# Patient Record
Sex: MALE | Race: WHITE | NOT HISPANIC OR LATINO | Employment: FULL TIME | URBAN - METROPOLITAN AREA
[De-identification: names, ages, dates, MRNs, and addresses within clinical notes are randomized per-mention and may not be internally consistent; named-entity substitution may affect disease eponyms.]

---

## 2017-04-17 ENCOUNTER — TELEPHONE (OUTPATIENT)
Dept: INTERNAL MEDICINE | Facility: CLINIC | Age: 52
End: 2017-04-17

## 2017-04-17 NOTE — TELEPHONE ENCOUNTER
----- Message from Taisha Potts sent at 4/17/2017 12:19 PM CDT -----  Contact: patient [phone] cell phone   Doctor appointment and lab have been scheduled.  Please link lab orders to the lab appointment.  Date of doctor appointment:  06/19/17  Physical or EP:  epp  Date of lab appointment:  06/12/17  Comments: Pt would like to speak with the nurse. Please call him .

## 2017-04-19 ENCOUNTER — PATIENT MESSAGE (OUTPATIENT)
Dept: INTERNAL MEDICINE | Facility: CLINIC | Age: 52
End: 2017-04-19

## 2017-04-19 NOTE — TELEPHONE ENCOUNTER
Called pt back his wife answered and informed me that they have a friend who is looking for a new PCP and they were wondering if Dr Sanchez would be able to take him on. i did inform them that technically Dr Sanchez is not taking new patients but it is at his discretion and i would send him a message and see what he says and get back to them. The pts name is Jevon Grullon MRN: [id number] : 66. i will be sending a message from that pts account to dr sanchez as well.

## 2017-06-19 ENCOUNTER — OFFICE VISIT (OUTPATIENT)
Dept: INTERNAL MEDICINE | Facility: CLINIC | Age: 52
End: 2017-06-19
Payer: COMMERCIAL

## 2017-06-19 ENCOUNTER — LAB VISIT (OUTPATIENT)
Dept: LAB | Facility: HOSPITAL | Age: 52
End: 2017-06-19
Attending: INTERNAL MEDICINE
Payer: COMMERCIAL

## 2017-06-19 VITALS
HEIGHT: 72 IN | WEIGHT: 223.56 LBS | BODY MASS INDEX: 30.28 KG/M2 | SYSTOLIC BLOOD PRESSURE: 136 MMHG | DIASTOLIC BLOOD PRESSURE: 86 MMHG | HEART RATE: 48 BPM

## 2017-06-19 DIAGNOSIS — R00.0 TACHYCARDIA: ICD-10-CM

## 2017-06-19 DIAGNOSIS — Z12.11 SCREENING FOR COLON CANCER: ICD-10-CM

## 2017-06-19 DIAGNOSIS — R79.89 ELEVATED LIVER FUNCTION TESTS: ICD-10-CM

## 2017-06-19 DIAGNOSIS — Z00.00 ANNUAL PHYSICAL EXAM: ICD-10-CM

## 2017-06-19 DIAGNOSIS — Z00.00 ANNUAL PHYSICAL EXAM: Primary | ICD-10-CM

## 2017-06-19 LAB
ALBUMIN SERPL BCP-MCNC: 4.3 G/DL
ALP SERPL-CCNC: 49 U/L
ALT SERPL W/O P-5'-P-CCNC: 34 U/L
ANION GAP SERPL CALC-SCNC: 9 MMOL/L
AST SERPL-CCNC: 24 U/L
BASOPHILS # BLD AUTO: 0.01 K/UL
BASOPHILS NFR BLD: 0.2 %
BILIRUB SERPL-MCNC: 0.9 MG/DL
BUN SERPL-MCNC: 17 MG/DL
CALCIUM SERPL-MCNC: 9.8 MG/DL
CHLORIDE SERPL-SCNC: 107 MMOL/L
CHOLEST/HDLC SERPL: 4.1 {RATIO}
CO2 SERPL-SCNC: 26 MMOL/L
COMPLEXED PSA SERPL-MCNC: 0.37 NG/ML
CREAT SERPL-MCNC: 1.2 MG/DL
DIFFERENTIAL METHOD: ABNORMAL
EOSINOPHIL # BLD AUTO: 0 K/UL
EOSINOPHIL NFR BLD: 0.5 %
ERYTHROCYTE [DISTWIDTH] IN BLOOD BY AUTOMATED COUNT: 13.2 %
EST. GFR  (AFRICAN AMERICAN): >60 ML/MIN/1.73 M^2
EST. GFR  (NON AFRICAN AMERICAN): >60 ML/MIN/1.73 M^2
GLUCOSE SERPL-MCNC: 105 MG/DL
HCT VFR BLD AUTO: 40.1 %
HDL/CHOLESTEROL RATIO: 24.4 %
HDLC SERPL-MCNC: 176 MG/DL
HDLC SERPL-MCNC: 43 MG/DL
HGB BLD-MCNC: 13.8 G/DL
LDLC SERPL CALC-MCNC: 119.4 MG/DL
LYMPHOCYTES # BLD AUTO: 1.5 K/UL
LYMPHOCYTES NFR BLD: 36 %
MCH RBC QN AUTO: 32.9 PG
MCHC RBC AUTO-ENTMCNC: 34.4 %
MCV RBC AUTO: 96 FL
MONOCYTES # BLD AUTO: 0.5 K/UL
MONOCYTES NFR BLD: 11.8 %
NEUTROPHILS # BLD AUTO: 2.1 K/UL
NEUTROPHILS NFR BLD: 51.5 %
NONHDLC SERPL-MCNC: 133 MG/DL
PLATELET # BLD AUTO: 183 K/UL
PMV BLD AUTO: 11.9 FL
POTASSIUM SERPL-SCNC: 4.9 MMOL/L
PROT SERPL-MCNC: 7.3 G/DL
RBC # BLD AUTO: 4.19 M/UL
SODIUM SERPL-SCNC: 142 MMOL/L
TRIGL SERPL-MCNC: 68 MG/DL
TSH SERPL DL<=0.005 MIU/L-ACNC: 1.54 UIU/ML
WBC # BLD AUTO: 4.08 K/UL

## 2017-06-19 PROCEDURE — 85025 COMPLETE CBC W/AUTO DIFF WBC: CPT | Mod: PO

## 2017-06-19 PROCEDURE — 99999 PR PBB SHADOW E&M-EST. PATIENT-LVL V: CPT | Mod: PBBFAC,,, | Performed by: INTERNAL MEDICINE

## 2017-06-19 PROCEDURE — 80053 COMPREHEN METABOLIC PANEL: CPT

## 2017-06-19 PROCEDURE — 84443 ASSAY THYROID STIM HORMONE: CPT

## 2017-06-19 PROCEDURE — 80061 LIPID PANEL: CPT

## 2017-06-19 PROCEDURE — 99396 PREV VISIT EST AGE 40-64: CPT | Mod: S$GLB,,, | Performed by: INTERNAL MEDICINE

## 2017-06-19 PROCEDURE — 93010 ELECTROCARDIOGRAM REPORT: CPT | Mod: S$GLB,,, | Performed by: INTERNAL MEDICINE

## 2017-06-19 PROCEDURE — 84153 ASSAY OF PSA TOTAL: CPT

## 2017-06-19 PROCEDURE — 36415 COLL VENOUS BLD VENIPUNCTURE: CPT | Mod: PO

## 2017-06-19 PROCEDURE — 93005 ELECTROCARDIOGRAM TRACING: CPT | Mod: S$GLB,,, | Performed by: INTERNAL MEDICINE

## 2017-06-19 NOTE — PROGRESS NOTES
REASON FOR VISIT:  This is a 52-year-old male who comes in for an annual routine   check.    Recently he had labs through [employer] through his company, [employer].  It was   noted that his ALT was 70 and , also cholesterol of 188 and .    He is also concerned about his heart rate when he exercises.  Actually he is   training for a triathlon.  For a long time he has done jogging, swimming, and   bike riding, but he finds that he can get his heart rate above what might be his   maximal heart rate of 170 very easily and even above that.  He will not be able   to sustain it that long, but it just comes easy for him to do it that way.    There is no unusual shortness of breath or short windedness.  Certainly there is   some based on degree of intense exercise that he is doing.    PAST MEDICAL HISTORY:  Hyperlipidemia in the past.  Nephrolithiasis.  Right shoulder surgery.  Elevated blood pressure in the past.  Elevated liver enzymes.    SOCIAL HISTORY:  Alcohol use, very rare.  Tobacco use, none.  Exercise as   described above.  , has one child.    FAMILY HISTORY:  Father is ; heart disease, kidney disease.  Mother is   alive, no major health conditions.  Two sisters, both with arthritis.  One   sister with fatty liver.  Brother in good health.    MEDICATIONS:  None.    REVIEW OF SYMPTOMS:  He feels well.  No chest pain, shortness of breath, or   abdominal pain.  He has regular bowel function.  No difficulty urinating.  No   arthralgias, headaches, or heartburn.    PHYSICAL EXAMINATION:  VITAL SIGNS:  Heart rate 48.  Blood pressure checked by me 136/86.  HEENT:  Tympanic membranes normal.  Nasal mucosa is clear.  Oropharynx, no   abnormal findings.  NECK:  No thyromegaly.  LUNGS:  Clear breath sounds.  HEART:  Regular rate and rhythm.  ABDOMEN:  Active bowel sounds, soft, nontender.  No hepatosplenomegaly or   abdominal masses.  PULSES:  2+ carotid, 2+ pedal pulses.  EXTREMITIES:  No  edema.  LYMPH GLAND:  No palpable adenopathy.  GENITALIA:  No scrotal masses, no hernias.  RECTAL:  Stool is brown, heme negative.  Prostate is mildly enlarged.    IMPRESSION:  1.  General examination.  2.  Elevated liver enzyme, probably consistent with fatty liver.  3.  Exertional tachycardia, questionable significance.    PLAN:  Today we will get an electrocardiogram and routine labs.  Screening   colonoscopy.  Discussed about him doing a CPX study.  Phone review to follow up.    /sc [id number] review      BIBI/DEEPA  dd: 06/19/2017 10:04:48 (CDT)  td: 06/19/2017 23:07:32 (CDT)  Doc ID   #[id number]  Job ID #[id number]    CC:       Answers for HPI/ROS submitted by the patient on 6/18/2017   activity change: No  unexpected weight change: No  neck pain: No  hearing loss: No  rhinorrhea: No  trouble swallowing: No  eye discharge: No  visual disturbance: No  chest tightness: No  wheezing: No  chest pain: No  palpitations: No  blood in stool: No  constipation: No  vomiting: No  diarrhea: No  polydipsia: No  polyuria: No  difficulty urinating: No  urgency: No  hematuria: No  joint swelling: No  arthralgias: No  headaches: No  weakness: No  confusion: No  dysphoric mood: No

## 2017-06-22 ENCOUNTER — DOCUMENTATION ONLY (OUTPATIENT)
Dept: INTERNAL MEDICINE | Facility: CLINIC | Age: 52
End: 2017-06-22

## 2017-06-22 ENCOUNTER — PATIENT MESSAGE (OUTPATIENT)
Dept: INTERNAL MEDICINE | Facility: CLINIC | Age: 52
End: 2017-06-22

## 2017-06-22 DIAGNOSIS — R94.31 ABNORMAL ECG: Primary | ICD-10-CM

## 2017-06-22 DIAGNOSIS — R03.0 ELEVATED BP WITHOUT DIAGNOSIS OF HYPERTENSION: ICD-10-CM

## 2017-06-22 NOTE — PROGRESS NOTES
Lab studies were fine     Liver studies were normal     ecg reveals changes from before  Will set up stress echo   Will also see if there is any changes with bp and heart with exercise

## 2017-06-23 ENCOUNTER — TELEPHONE (OUTPATIENT)
Dept: INTERNAL MEDICINE | Facility: CLINIC | Age: 52
End: 2017-06-23

## 2017-06-23 NOTE — TELEPHONE ENCOUNTER
Set up stress echo  May need to call him to set up (Routing comment)       Patient schedule for Stress test and notified.

## 2017-06-28 ENCOUNTER — HOSPITAL ENCOUNTER (OUTPATIENT)
Dept: CARDIOLOGY | Facility: CLINIC | Age: 52
Discharge: HOME | End: 2017-06-28
Payer: COMMERCIAL

## 2017-06-28 DIAGNOSIS — R03.0 ELEVATED BP WITHOUT DIAGNOSIS OF HYPERTENSION: ICD-10-CM

## 2017-06-28 DIAGNOSIS — R94.31 ABNORMAL ECG: ICD-10-CM

## 2017-06-28 LAB
DIASTOLIC DYSFUNCTION: NO
MITRAL VALVE MOBILITY: NORMAL
MITRAL VALVE REGURGITATION: NORMAL
RETIRED EF AND QEF - SEE NOTES: 55 (ref 55–65)

## 2017-06-28 PROCEDURE — 93321 DOPPLER ECHO F-UP/LMTD STD: CPT | Mod: S$GLB,,, | Performed by: INTERNAL MEDICINE

## 2017-06-28 PROCEDURE — 93351 STRESS TTE COMPLETE: CPT | Mod: S$GLB,,, | Performed by: INTERNAL MEDICINE

## 2017-07-01 ENCOUNTER — PATIENT MESSAGE (OUTPATIENT)
Dept: INTERNAL MEDICINE | Facility: CLINIC | Age: 52
End: 2017-07-01

## 2017-07-01 DIAGNOSIS — R03.0 ELEVATED BLOOD PRESSURE READING WITHOUT DIAGNOSIS OF HYPERTENSION: Primary | ICD-10-CM

## 2017-07-01 NOTE — PROGRESS NOTES
TEST RESULTS WERE NOTED   NO EXERTIONAL ISCHEMIA    BLOOD PRESSURE WAS UPPER END OF NORMAL AND HAD HYPERTENSIVE RESPONSE TO EXERCISE         RECOMMEND DIGITAL HYPERTENSION PROGRAMM

## 2017-07-03 ENCOUNTER — TELEPHONE (OUTPATIENT)
Dept: INTERNAL MEDICINE | Facility: CLINIC | Age: 52
End: 2017-07-03

## 2017-07-03 NOTE — TELEPHONE ENCOUNTER
Set digital hypertension programm (Routing comment)     L/M for return phone call.    Called patient no answer.

## 2017-07-17 ENCOUNTER — TELEPHONE (OUTPATIENT)
Dept: INTERNAL MEDICINE | Facility: CLINIC | Age: 52
End: 2017-07-17

## 2017-07-17 NOTE — TELEPHONE ENCOUNTER
Pt gave me the pts name and number I called the pt and his name is rex ferraro NIALL 89 he is not in [facility] he said he will try and go on "[employer]"ner and sign up and when he gets that set up he will call again. Will check and see if Dr Sanchez will accept this pt as a new pt. He said it is a friend of his step son

## 2017-07-17 NOTE — TELEPHONE ENCOUNTER
----- Message from Taisha Potts sent at 7/17/2017  1:03 PM CDT -----  Contact: patient [phone]  Pt would like to speak with the nurse about  taking a new patient.

## 2017-07-17 NOTE — TELEPHONE ENCOUNTER
Called pt back pt is asking if Dr Sanchez will take on a new pt rex ferraro. He will call me back with a number. Missed the call. Returned call and left a message on VM to call me back

## 2018-02-01 ENCOUNTER — OFFICE VISIT (OUTPATIENT)
Dept: INTERNAL MEDICINE | Facility: CLINIC | Age: 53
End: 2018-02-01
Payer: COMMERCIAL

## 2018-02-01 VITALS
DIASTOLIC BLOOD PRESSURE: 88 MMHG | HEART RATE: 53 BPM | SYSTOLIC BLOOD PRESSURE: 131 MMHG | WEIGHT: 231.94 LBS | BODY MASS INDEX: 31.42 KG/M2 | TEMPERATURE: 98 F | HEIGHT: 72 IN

## 2018-02-01 DIAGNOSIS — J06.9 VIRAL URI WITH COUGH: Primary | ICD-10-CM

## 2018-02-01 PROCEDURE — 3008F BODY MASS INDEX DOCD: CPT | Mod: S$GLB,,, | Performed by: INTERNAL MEDICINE

## 2018-02-01 PROCEDURE — 99213 OFFICE O/P EST LOW 20 MIN: CPT | Mod: S$GLB,,, | Performed by: INTERNAL MEDICINE

## 2018-02-01 PROCEDURE — 99999 PR PBB SHADOW E&M-EST. PATIENT-LVL III: CPT | Mod: PBBFAC,,, | Performed by: INTERNAL MEDICINE

## 2018-02-01 RX ORDER — PREDNISONE 20 MG/1
TABLET ORAL
Qty: 12 TABLET | Refills: 0 | Status: SHIPPED | OUTPATIENT
Start: 2018-02-01 | End: 2020-07-30 | Stop reason: ALTCHOICE

## 2018-02-01 NOTE — PROGRESS NOTES
REASON FOR VISIT:  This is a 52-year-old male.  For about 10 days, he has been   having persistent chest congestion and cough, this is just not going away.  He   can take any over-the-counter medicines such as Mucinex and he is better for a   while.  He takes it before going to bed and he has no trouble, but he just feels   a bit congested and when he coughs, it is mainly clear mucus.  He is not short   of breath, but sometimes the breath may be a little bit labored.  No recent   sweat, fever.  No head congestion.    PHYSICAL EXAMINATION:  VITAL SIGNS:  Per Epic.  HEENT:  Tympanic membranes normal.  Slight serous effusion, left ear.  Nasal   mucosa is clear.  Oropharynx, no abnormal findings.  CHEST:  Lung fields are clear, good air movement, no wheezes or rales.  HEART:  Regular rate and rhythm.    IMPRESSION:  Viral upper respiratory infection with cough.    PLAN:  Prednisone taper over the next six days, and he can continue Mucinex DM   which works well.      JAM/HN  dd: 02/01/2018 12:20:21 (CST)  td: 02/02/2018 02:06:00 (CST)  Doc ID   #[id number]  Job ID #[id number]    CC:

## 2018-05-25 ENCOUNTER — TELEPHONE (OUTPATIENT)
Dept: ENDOSCOPY | Facility: HOSPITAL | Age: 53
End: 2018-05-25

## 2018-07-31 ENCOUNTER — TELEPHONE (OUTPATIENT)
Dept: INTERNAL MEDICINE | Facility: CLINIC | Age: 53
End: 2018-07-31

## 2018-07-31 ENCOUNTER — PATIENT MESSAGE (OUTPATIENT)
Dept: INTERNAL MEDICINE | Facility: CLINIC | Age: 53
End: 2018-07-31

## 2018-07-31 RX ORDER — CYCLOBENZAPRINE HCL 10 MG
10 TABLET ORAL 3 TIMES DAILY PRN
Qty: 30 TABLET | Refills: 0 | Status: SHIPPED | OUTPATIENT
Start: 2018-07-31 | End: 2018-08-10

## 2018-07-31 NOTE — TELEPHONE ENCOUNTER
"----- Message from Sharlene Packer sent at 7/31/2018  2:31 PM CDT -----  Contact: self/ [phone]  RX request - refill or new RX.  Is this a refill or new RX:    RX name and strength: Cyclobenzaprine 10mg  Directions:   Is this a 30 day or 90 day RX:    Local pharmacy or mail order pharmacy:    Pharmacy name and phone # (DON'T enter "on file" or "in chart"): [id number] - [place] - [address] [facility] [phone] (Phone)  [phone] (Fax)      Comments:        "

## 2020-05-13 ENCOUNTER — TELEPHONE (OUTPATIENT)
Dept: INTERNAL MEDICINE | Facility: CLINIC | Age: 55
End: 2020-05-13

## 2020-05-13 DIAGNOSIS — I48.0 PAF (PAROXYSMAL ATRIAL FIBRILLATION): ICD-10-CM

## 2020-05-13 DIAGNOSIS — Z12.5 ENCOUNTER FOR PROSTATE CANCER SCREENING: ICD-10-CM

## 2020-05-13 DIAGNOSIS — Z00.00 ANNUAL PHYSICAL EXAM: Primary | ICD-10-CM

## 2020-05-13 NOTE — TELEPHONE ENCOUNTER
Call patient    He has an appointment in the afternoon tomorrow    His wife told me that he might want to get blood test in the morning before the appointment    The orders in and we can make arrangements for the be done at [facility]

## 2020-05-14 ENCOUNTER — LAB VISIT (OUTPATIENT)
Dept: LAB | Facility: HOSPITAL | Age: 55
End: 2020-05-14
Attending: INTERNAL MEDICINE
Payer: COMMERCIAL

## 2020-05-14 ENCOUNTER — PATIENT MESSAGE (OUTPATIENT)
Dept: INTERNAL MEDICINE | Facility: CLINIC | Age: 55
End: 2020-05-14

## 2020-05-14 ENCOUNTER — HOSPITAL ENCOUNTER (OUTPATIENT)
Dept: CARDIOLOGY | Facility: CLINIC | Age: 55
Discharge: HOME | End: 2020-05-14
Attending: INTERNAL MEDICINE
Payer: COMMERCIAL

## 2020-05-14 ENCOUNTER — OFFICE VISIT (OUTPATIENT)
Dept: INTERNAL MEDICINE | Facility: CLINIC | Age: 55
End: 2020-05-14
Payer: COMMERCIAL

## 2020-05-14 VITALS
BODY MASS INDEX: 30.75 KG/M2 | WEIGHT: 227 LBS | HEIGHT: 72 IN | DIASTOLIC BLOOD PRESSURE: 88 MMHG | OXYGEN SATURATION: 98 % | HEART RATE: 80 BPM | SYSTOLIC BLOOD PRESSURE: 126 MMHG

## 2020-05-14 VITALS
DIASTOLIC BLOOD PRESSURE: 88 MMHG | WEIGHT: 227 LBS | HEIGHT: 72 IN | HEART RATE: 80 BPM | SYSTOLIC BLOOD PRESSURE: 126 MMHG | BODY MASS INDEX: 30.75 KG/M2

## 2020-05-14 DIAGNOSIS — I48.0 PAF (PAROXYSMAL ATRIAL FIBRILLATION): ICD-10-CM

## 2020-05-14 DIAGNOSIS — I10 ESSENTIAL HYPERTENSION: ICD-10-CM

## 2020-05-14 DIAGNOSIS — Z12.5 ENCOUNTER FOR PROSTATE CANCER SCREENING: ICD-10-CM

## 2020-05-14 DIAGNOSIS — E78.5 HYPERLIPIDEMIA, UNSPECIFIED HYPERLIPIDEMIA TYPE: ICD-10-CM

## 2020-05-14 DIAGNOSIS — I48.91 ATRIAL FIBRILLATION, UNSPECIFIED TYPE: ICD-10-CM

## 2020-05-14 DIAGNOSIS — Z00.00 ANNUAL PHYSICAL EXAM: ICD-10-CM

## 2020-05-14 DIAGNOSIS — Z00.00 ANNUAL PHYSICAL EXAM: Primary | ICD-10-CM

## 2020-05-14 LAB
ALBUMIN SERPL BCP-MCNC: 4.3 G/DL (ref 3.5–5.2)
ALP SERPL-CCNC: 50 U/L (ref 55–135)
ALT SERPL W/O P-5'-P-CCNC: 35 U/L (ref 10–44)
ANION GAP SERPL CALC-SCNC: 8 MMOL/L (ref 8–16)
ASCENDING AORTA: 3.25 CM
AST SERPL-CCNC: 25 U/L (ref 10–40)
AV INDEX (PROSTH): 0.47
AV MEAN GRADIENT: 3 MMHG
AV PEAK GRADIENT: 5 MMHG
AV VALVE AREA: 2.28 CM2
AV VELOCITY RATIO: 0.5
BASOPHILS # BLD AUTO: 0.03 K/UL (ref 0–0.2)
BASOPHILS NFR BLD: 0.6 % (ref 0–1.9)
BILIRUB SERPL-MCNC: 0.7 MG/DL (ref 0.1–1)
BSA FOR ECHO PROCEDURE: 2.29 M2
BUN SERPL-MCNC: 19 MG/DL (ref 6–20)
CALCIUM SERPL-MCNC: 9.5 MG/DL (ref 8.7–10.5)
CHLORIDE SERPL-SCNC: 108 MMOL/L (ref 95–110)
CHOLEST SERPL-MCNC: 190 MG/DL (ref 120–199)
CHOLEST/HDLC SERPL: 5.3 {RATIO} (ref 2–5)
CO2 SERPL-SCNC: 27 MMOL/L (ref 23–29)
COMPLEXED PSA SERPL-MCNC: 0.32 NG/ML (ref 0–4)
CREAT SERPL-MCNC: 1.2 MG/DL (ref 0.5–1.4)
CV ECHO LV RWT: 0.36 CM
DIFFERENTIAL METHOD: ABNORMAL
DOP CALC AO PEAK VEL: 1.17 M/S
DOP CALC AO VTI: 23.39 CM
DOP CALC LVOT AREA: 4.9 CM2
DOP CALC LVOT DIAMETER: 2.49 CM
DOP CALC LVOT PEAK VEL: 0.58 M/S
DOP CALC LVOT STROKE VOLUME: 53.29 CM3
DOP CALCLVOT PEAK VEL VTI: 10.95 CM
E WAVE DECELERATION TIME: 129.18 MSEC
E/A RATIO: 1.64
E/E' RATIO: 5.13 M/S
ECHO LV POSTERIOR WALL: 0.88 CM (ref 0.6–1.1)
EOSINOPHIL # BLD AUTO: 0 K/UL (ref 0–0.5)
EOSINOPHIL NFR BLD: 0.6 % (ref 0–8)
ERYTHROCYTE [DISTWIDTH] IN BLOOD BY AUTOMATED COUNT: 13 % (ref 11.5–14.5)
EST. GFR  (AFRICAN AMERICAN): >60 ML/MIN/1.73 M^2
EST. GFR  (NON AFRICAN AMERICAN): >60 ML/MIN/1.73 M^2
FRACTIONAL SHORTENING: 27 % (ref 28–44)
GLUCOSE SERPL-MCNC: 110 MG/DL (ref 70–110)
HCT VFR BLD AUTO: 46.8 % (ref 40–54)
HDLC SERPL-MCNC: 36 MG/DL (ref 40–75)
HDLC SERPL: 18.9 % (ref 20–50)
HGB BLD-MCNC: 15.1 G/DL (ref 14–18)
IMM GRANULOCYTES # BLD AUTO: 0.01 K/UL (ref 0–0.04)
IMM GRANULOCYTES NFR BLD AUTO: 0.2 % (ref 0–0.5)
INTERVENTRICULAR SEPTUM: 1.13 CM (ref 0.6–1.1)
LA MAJOR: 6.9 CM
LA MINOR: 6.76 CM
LA WIDTH: 4.54 CM
LDLC SERPL CALC-MCNC: 141.4 MG/DL (ref 63–159)
LEFT ATRIUM SIZE: 4.36 CM
LEFT ATRIUM VOLUME INDEX: 51.1 ML/M2
LEFT ATRIUM VOLUME: 114.9 CM3
LEFT INTERNAL DIMENSION IN SYSTOLE: 3.52 CM (ref 2.1–4)
LEFT VENTRICLE DIASTOLIC VOLUME INDEX: 48.51 ML/M2
LEFT VENTRICLE DIASTOLIC VOLUME: 109.06 ML
LEFT VENTRICLE MASS INDEX: 77 G/M2
LEFT VENTRICLE SYSTOLIC VOLUME INDEX: 23 ML/M2
LEFT VENTRICLE SYSTOLIC VOLUME: 51.67 ML
LEFT VENTRICULAR INTERNAL DIMENSION IN DIASTOLE: 4.83 CM (ref 3.5–6)
LEFT VENTRICULAR MASS: 173.1 G
LV LATERAL E/E' RATIO: 3.93 M/S
LV SEPTAL E/E' RATIO: 7.38 M/S
LYMPHOCYTES # BLD AUTO: 1.9 K/UL (ref 1–4.8)
LYMPHOCYTES NFR BLD: 41.1 % (ref 18–48)
MCH RBC QN AUTO: 32.5 PG (ref 27–31)
MCHC RBC AUTO-ENTMCNC: 32.3 G/DL (ref 32–36)
MCV RBC AUTO: 101 FL (ref 82–98)
MONOCYTES # BLD AUTO: 0.5 K/UL (ref 0.3–1)
MONOCYTES NFR BLD: 9.6 % (ref 4–15)
MV PEAK A VEL: 0.36 M/S
MV PEAK E VEL: 0.59 M/S
NEUTROPHILS # BLD AUTO: 2.2 K/UL (ref 1.8–7.7)
NEUTROPHILS NFR BLD: 47.9 % (ref 38–73)
NONHDLC SERPL-MCNC: 154 MG/DL
NRBC BLD-RTO: 0 /100 WBC
PISA TR MAX VEL: 2.06 M/S
PLATELET # BLD AUTO: 225 K/UL (ref 150–350)
PMV BLD AUTO: 11.1 FL (ref 9.2–12.9)
POTASSIUM SERPL-SCNC: 4.6 MMOL/L (ref 3.5–5.1)
PROT SERPL-MCNC: 7.1 G/DL (ref 6–8.4)
PULM VEIN S/D RATIO: 0.7
PV PEAK D VEL: 0.43 M/S
PV PEAK S VEL: 0.3 M/S
RA MAJOR: 5.64 CM
RA PRESSURE: 3 MMHG
RA WIDTH: 3.5 CM
RBC # BLD AUTO: 4.64 M/UL (ref 4.6–6.2)
RIGHT VENTRICULAR END-DIASTOLIC DIMENSION: 3.5 CM
SINUS: 3.95 CM
SODIUM SERPL-SCNC: 143 MMOL/L (ref 136–145)
STJ: 3.04 CM
TDI LATERAL: 0.15 M/S
TDI SEPTAL: 0.08 M/S
TDI: 0.12 M/S
TR MAX PG: 17 MMHG
TRICUSPID ANNULAR PLANE SYSTOLIC EXCURSION: 1.51 CM
TRIGL SERPL-MCNC: 63 MG/DL (ref 30–150)
TSH SERPL DL<=0.005 MIU/L-ACNC: 2.2 UIU/ML (ref 0.4–4)
TV REST PULMONARY ARTERY PRESSURE: 20 MMHG
WBC # BLD AUTO: 4.67 K/UL (ref 3.9–12.7)

## 2020-05-14 PROCEDURE — 99396 PREV VISIT EST AGE 40-64: CPT | Mod: S$GLB,,, | Performed by: INTERNAL MEDICINE

## 2020-05-14 PROCEDURE — 93005 EKG 12-LEAD: ICD-10-PCS | Mod: S$GLB,,, | Performed by: INTERNAL MEDICINE

## 2020-05-14 PROCEDURE — 80053 COMPREHEN METABOLIC PANEL: CPT

## 2020-05-14 PROCEDURE — 99999 PR PBB SHADOW E&M-EST. PATIENT-LVL III: ICD-10-PCS | Mod: PBBFAC,,, | Performed by: INTERNAL MEDICINE

## 2020-05-14 PROCEDURE — 85025 COMPLETE CBC W/AUTO DIFF WBC: CPT

## 2020-05-14 PROCEDURE — 84443 ASSAY THYROID STIM HORMONE: CPT

## 2020-05-14 PROCEDURE — 80061 LIPID PANEL: CPT

## 2020-05-14 PROCEDURE — 36415 COLL VENOUS BLD VENIPUNCTURE: CPT

## 2020-05-14 PROCEDURE — 93306 ECHO (CUPID ONLY): ICD-10-PCS | Mod: S$GLB,,, | Performed by: INTERNAL MEDICINE

## 2020-05-14 PROCEDURE — 93306 TTE W/DOPPLER COMPLETE: CPT | Mod: S$GLB,,, | Performed by: INTERNAL MEDICINE

## 2020-05-14 PROCEDURE — 93010 ELECTROCARDIOGRAM REPORT: CPT | Mod: S$GLB,,, | Performed by: INTERNAL MEDICINE

## 2020-05-14 PROCEDURE — 93005 ELECTROCARDIOGRAM TRACING: CPT | Mod: S$GLB,,, | Performed by: INTERNAL MEDICINE

## 2020-05-14 PROCEDURE — 93010 EKG 12-LEAD: ICD-10-PCS | Mod: S$GLB,,, | Performed by: INTERNAL MEDICINE

## 2020-05-14 PROCEDURE — 84153 ASSAY OF PSA TOTAL: CPT

## 2020-05-14 PROCEDURE — 99999 PR PBB SHADOW E&M-EST. PATIENT-LVL III: CPT | Mod: PBBFAC,,, | Performed by: INTERNAL MEDICINE

## 2020-05-14 PROCEDURE — 99396 PR PREVENTIVE VISIT,EST,40-64: ICD-10-PCS | Mod: S$GLB,,, | Performed by: INTERNAL MEDICINE

## 2020-05-14 NOTE — PROGRESS NOTES
PAST MEDICAL HISTORY:  Hyperlipidemia in the past.  Nephrolithiasis.  Right shoulder surgery.  Elevated blood pressure in the past.  Elevated liver enzymes.     SOCIAL HISTORY:  Alcohol use, very rare.  Tobacco use, none.  Exercise as described above.  , has one child.     FAMILY HISTORY:  Father is ; heart disease, kidney disease.  Mother is alive, no major health conditions.  Two sisters, both with arthritis.  One sister with fatty liver.  Brother in good health.      55-year-old male    Annual visit    Noted is that he had a electrocardiogram part of a company physical on 2020 and was reported as atrial fibrillation  He was told to follow-up with his primary care physician  He was asymptomatic in overall feels fine    Patient exercises regularly with biking in jogging    He reports no chest pain, palpitations, shortness of breath    Examination  Weight 227 lb  Pulse 76  Blood pressure 126/82  HEENT exam no abnormal findings  Neck no thyromegaly no masses  Lungs clear breath sounds  Heart irregular no murmurs  Abdominal exam nontender soft no hepatosplenomegaly  Pulses 2+ carotid pulses 2+ pedal pulses no bruits  Extremities no edema    Impression g  General exam  Atrial fibrillation  Hyperlipidemia    Plan  Electrocardiogram  Routine labs were done this morning or pending

## 2020-05-14 NOTE — PROGRESS NOTES
2D echo revealed ejection fraction 50%, left atrial enlargement    Has an appointment with arrhythmia on June 1st but in the meantime will start Eliquis 5 mg b.i.d.

## 2020-05-15 ENCOUNTER — TELEPHONE (OUTPATIENT)
Dept: INTERNAL MEDICINE | Facility: CLINIC | Age: 55
End: 2020-05-15

## 2020-05-15 NOTE — TELEPHONE ENCOUNTER
Reviewed EKG, as I am covering for . Called patient- no answer. Left message. Will continue to try to reach patient by phone

## 2020-05-18 ENCOUNTER — TELEPHONE (OUTPATIENT)
Dept: INTERNAL MEDICINE | Facility: CLINIC | Age: 55
End: 2020-05-18

## 2020-05-18 NOTE — TELEPHONE ENCOUNTER
----- Message from Magali Patel sent at 5/18/2020  9:21 AM CDT -----  Contact: self    Patient is returning a phone call.  Who left a message for the patient: Trinidad Crump MD   Does patient know what this is regarding:  results  Comments:

## 2020-06-01 ENCOUNTER — HOSPITAL ENCOUNTER (OUTPATIENT)
Dept: CARDIOLOGY | Facility: CLINIC | Age: 55
Discharge: HOME | End: 2020-06-01
Payer: COMMERCIAL

## 2020-06-01 ENCOUNTER — TELEPHONE (OUTPATIENT)
Dept: CARDIOLOGY | Facility: CLINIC | Age: 55
End: 2020-06-01

## 2020-06-01 ENCOUNTER — OFFICE VISIT (OUTPATIENT)
Dept: CARDIOLOGY | Facility: CLINIC | Age: 55
End: 2020-06-01
Payer: COMMERCIAL

## 2020-06-01 VITALS
DIASTOLIC BLOOD PRESSURE: 96 MMHG | SYSTOLIC BLOOD PRESSURE: 139 MMHG | BODY MASS INDEX: 31.12 KG/M2 | HEART RATE: 71 BPM | HEIGHT: 72 IN | WEIGHT: 229.75 LBS

## 2020-06-01 DIAGNOSIS — I48.91 ATRIAL FIBRILLATION, UNSPECIFIED TYPE: Primary | ICD-10-CM

## 2020-06-01 DIAGNOSIS — R00.2 PALPITATION: ICD-10-CM

## 2020-06-01 DIAGNOSIS — R00.2 PALPITATION: Primary | ICD-10-CM

## 2020-06-01 PROCEDURE — 99203 PR OFFICE/OUTPT VISIT, NEW, LEVL III, 30-44 MIN: ICD-10-PCS | Mod: S$GLB,,, | Performed by: STUDENT IN AN ORGANIZED HEALTH CARE EDUCATION/TRAINING PROGRAM

## 2020-06-01 PROCEDURE — 99999 PR PBB SHADOW E&M-EST. PATIENT-LVL IV: ICD-10-PCS | Mod: PBBFAC,,, | Performed by: STUDENT IN AN ORGANIZED HEALTH CARE EDUCATION/TRAINING PROGRAM

## 2020-06-01 PROCEDURE — 3008F BODY MASS INDEX DOCD: CPT | Mod: CPTII,S$GLB,, | Performed by: STUDENT IN AN ORGANIZED HEALTH CARE EDUCATION/TRAINING PROGRAM

## 2020-06-01 PROCEDURE — 99999 PR PBB SHADOW E&M-EST. PATIENT-LVL IV: CPT | Mod: PBBFAC,,, | Performed by: STUDENT IN AN ORGANIZED HEALTH CARE EDUCATION/TRAINING PROGRAM

## 2020-06-01 PROCEDURE — 99203 OFFICE O/P NEW LOW 30 MIN: CPT | Mod: S$GLB,,, | Performed by: STUDENT IN AN ORGANIZED HEALTH CARE EDUCATION/TRAINING PROGRAM

## 2020-06-01 PROCEDURE — 93010 EKG 12-LEAD: ICD-10-PCS | Mod: S$GLB,,, | Performed by: INTERNAL MEDICINE

## 2020-06-01 PROCEDURE — 3008F PR BODY MASS INDEX (BMI) DOCUMENTED: ICD-10-PCS | Mod: CPTII,S$GLB,, | Performed by: STUDENT IN AN ORGANIZED HEALTH CARE EDUCATION/TRAINING PROGRAM

## 2020-06-01 PROCEDURE — 93005 ELECTROCARDIOGRAM TRACING: CPT | Mod: S$GLB,,, | Performed by: STUDENT IN AN ORGANIZED HEALTH CARE EDUCATION/TRAINING PROGRAM

## 2020-06-01 PROCEDURE — 93010 ELECTROCARDIOGRAM REPORT: CPT | Mod: S$GLB,,, | Performed by: INTERNAL MEDICINE

## 2020-06-01 PROCEDURE — 93005 EKG 12-LEAD: ICD-10-PCS | Mod: S$GLB,,, | Performed by: STUDENT IN AN ORGANIZED HEALTH CARE EDUCATION/TRAINING PROGRAM

## 2020-06-01 RX ORDER — METOPROLOL TARTRATE 25 MG/1
25 TABLET, FILM COATED ORAL 2 TIMES DAILY
Qty: 60 TABLET | Refills: 11 | Status: SHIPPED | OUTPATIENT
Start: 2020-06-01 | End: 2020-10-06

## 2020-06-01 NOTE — PROGRESS NOTES
PCP - Jose Sanchez MD  Referring Physician:     Subjective:   Patient ID:  Jhonatan Sofia is a 55 y.o. y.o. male who presents for evaluation and treatment of  Atrial fibrillation.    - PMHx of HLD, HTN on AC (Eliquis), elevated Liver enzymes, Nephrolithiasis, Obesity (BMI 31)    - FamHx: father  (MI,CKD),    - Social Hx: Social EtOH, nonsmoker, no illcit drug.     Patient seen and evaluated, accompanied with spouse. Patient states he is a , he completed routine annual EKG on 2020 for his work, which resulted in Atrial fibrillation. He has completed yearly EKG, never had Afib prior to this. He works out intensity, noted to have HR >200s hence completed LAUREN 2017, Neg for ischemia, METS 15, Duke score 7, low probability of CV events. Patient is able to complete all of is ADLs. Asymptomatic from his rhythm standpoint, denies chest pain, sob, duncan, no syncope. Never been treated for Afib, no DCCV, no ablation. Denies sleep apnea or snoring, never been evaluate for FANTA. Social EtOH. He was prescribed Eliquis 5mg BID by PCP, however has not started it.       TTE 2020  · Low normal left ventricular systolic function. The estimated ejection fraction is 50%.  · No wall motion abnormalities.  · Mildly reduced right ventricular systolic function.  · Severe left atrial enlargement.  · The estimated PA systolic pressure is 20 mmHg.  · Normal central venous pressure (3 mmHg).  · The sinuses of Valsalva is mildly dilated.  · Atrial fibrillation observed.     LAUREN 2017: Neg for ischemia, METS 15, Duke score 7, low probability of CV events      History:     Social History     Tobacco Use    Smoking status: Never Smoker    Smokeless tobacco: Never Used   Substance Use Topics    Alcohol use: Yes     Alcohol/week: 2.0 standard drinks     Types: 2 Cans of beer per week     Frequency: 2-4 times a month     Drinks per session: 1 or 2     Binge frequency: Less than monthly      Comment: minimal     Family History   Problem Relation Age of Onset    Heart disease Father     Kidney failure Father     Liver disease Sister         fatty liver    Arthritis Sister     Arthritis Sister        Meds:   Review of patient's allergies indicates:  No Known Allergies    Current Outpatient Medications:     apixaban (ELIQUIS) 5 mg Tab, Take 1 tablet (5 mg total) by mouth 2 (two) times daily., Disp: 60 tablet, Rfl: 0    predniSONE (DELTASONE) 20 MG tablet, 3 tablets po daily for 2 days, then 2 tablets po daily for 2 days, then 1 tablets po daily for 2 days (Patient not taking: Reported on 6/1/2020), Disp: 12 tablet, Rfl: 0    Review of Systems   Constitutional: Negative for chills, fever, malaise/fatigue and weight loss.   Respiratory: Negative for cough, sputum production and shortness of breath.    Cardiovascular: Negative for chest pain, palpitations, orthopnea, claudication, leg swelling and PND.   Gastrointestinal: Negative for abdominal pain, diarrhea, nausea and vomiting.   Genitourinary: Negative for dysuria and urgency.       Objective:   BP (!) 139/96 (BP Location: Left arm, Patient Position: Sitting, BP Method: Medium (Automatic))   Pulse 71   Ht 6' (1.829 m)   Wt 104.2 kg (229 lb 11.5 oz)   BMI 31.16 kg/m²   Physical Exam   Constitutional: He is oriented to person, place, and time. No distress.   HENT:   Head: Normocephalic and atraumatic.   Neck: Normal range of motion. Neck supple. No JVD present.   Cardiovascular: Normal rate, normal heart sounds and intact distal pulses. An irregularly irregular rhythm present. Exam reveals no gallop and no friction rub.   No murmur heard.  Pulmonary/Chest: Effort normal and breath sounds normal. No respiratory distress. He has no wheezes. He has no rales.   Abdominal: Soft. Bowel sounds are normal. He exhibits no distension. There is no tenderness.   Musculoskeletal: Normal range of motion.   Neurological: He is alert and oriented to person,  place, and time.   Skin: Skin is warm and dry. He is not diaphoretic.       Labs:     Lab Results   Component Value Date     05/14/2020    K 4.6 05/14/2020     05/14/2020    CO2 27 05/14/2020    BUN 19 05/14/2020    CREATININE 1.2 05/14/2020    ANIONGAP 8 05/14/2020     Lab Results   Component Value Date    HGBA1C 5.7 06/20/2016     No results found for: BNP, BNPTRIAGEBLO    Lab Results   Component Value Date    WBC 4.67 05/14/2020    HGB 15.1 05/14/2020    HCT 46.8 05/14/2020     05/14/2020    GRAN 2.2 05/14/2020    GRAN 47.9 05/14/2020     Lab Results   Component Value Date    CHOL 190 05/14/2020    HDL 36 (L) 05/14/2020    LDLCALC 141.4 05/14/2020    TRIG 63 05/14/2020       Lab Results   Component Value Date     05/14/2020    K 4.6 05/14/2020     05/14/2020    CO2 27 05/14/2020    BUN 19 05/14/2020    CREATININE 1.2 05/14/2020    ANIONGAP 8 05/14/2020     Lab Results   Component Value Date    HGBA1C 5.7 06/20/2016     No results found for: BNP, BNPTRIAGEBLO Lab Results   Component Value Date    WBC 4.67 05/14/2020    HGB 15.1 05/14/2020    HCT 46.8 05/14/2020     05/14/2020    GRAN 2.2 05/14/2020    GRAN 47.9 05/14/2020     Lab Results   Component Value Date    CHOL 190 05/14/2020    HDL 36 (L) 05/14/2020    LDLCALC 141.4 05/14/2020    TRIG 63 05/14/2020                Cardiovascular Imaging:     TTE 05/14/2020  · Low normal left ventricular systolic function. The estimated ejection fraction is 50%.  · No wall motion abnormalities.  · Mildly reduced right ventricular systolic function.  · Severe left atrial enlargement.  · The estimated PA systolic pressure is 20 mmHg.  · Normal central venous pressure (3 mmHg).  · The sinuses of Valsalva is mildly dilated.  · Atrial fibrillation observed.     LAUREN 2017: Neg for ischemia, METS 15, Duke score 7, low probability of CV events      Assessment & Plan:     1. Atrial fibrillation, unspecified type        Jhonatan Sofia is a 55  y.o. y.o. male who presents for evaluation and treatment of Atrial fibrillation. His Afib risk factors include Hypertension, Obesity, Etoh use, and aggressive work outs.    - JUSTA 51, low normal LV / RV function   - Asymptomatic from Afib standpoint   - [id number] score 1 / HASBLED score of 2 with a 1.88% risk of bleeding    - At length discussion with patient and spouse, explained pathophysiology of atrial fibrillation. Patient has a CHADSVASC of 1, literature supports use of AC >2, we discussed the risk of CVA event. Patient would like to initiate AC therapy.    - Eliquis 5mg BID   - Rate control with Metoprolol 25mg BID, noted higher HR 70 - 80s   - EP consultation, Dr. Shell for DCCV vs. AAD vs. Ablation   - LAUREN 2017, negative for ischemic heart disease.   - PcP follow up for PSG   - RTC with me in 6 - 8 weeks      Elevated BP, SBP 130s in clinic:  - not on Anti-HtN, home BP better  - low salt diet  - BP / HR logs       Dyslipidemia:   - Dietary modification   - Repeat Lipid panel in 3 months          Signed:  Justus Mitchell M.D.  Page # [phone]  Cardiovascular Fellow PGY-V  [facility]

## 2020-06-10 PROBLEM — I10 ESSENTIAL HYPERTENSION: Status: ACTIVE | Noted: 2020-06-10

## 2020-06-19 ENCOUNTER — TELEPHONE (OUTPATIENT)
Dept: SLEEP MEDICINE | Facility: OTHER | Age: 55
End: 2020-06-19

## 2020-06-19 ENCOUNTER — PATIENT OUTREACH (OUTPATIENT)
Dept: OTHER | Facility: OTHER | Age: 55
End: 2020-06-19

## 2020-06-19 NOTE — LETTER
June 25, 2020     Jhonatan Sofia  [address] [place] [address]       Dear Jhonatan,    Welcome to [facility] Our goal is to make care effective, proactive and convenient by using data you send us from home to better treat your chronic conditions.              My name is Yoel Ngo, and I am your dedicated Digital Medicine clinician. As an expert in medication management, I will help ensure that the medications you are taking continue to provide the intended benefits and help you reach your goals. You can reach me directly at [phone] or by sending me a message directly through your [facility] account.      I am Nancy Lund and I will be your health . My job is to help you identify lifestyle changes to improve your disease control. We will talk about nutrition, exercise, and other ways you may be able to adjust your current habits to better your health. Additionally, we will help ensure you are completing the tests and screenings that are necessary to help manage your conditions. You can reach me directly at [phone] or by sending me a message directly through your [facility] account.    Most importantly, YOU are at the center of this team. Together, we will work to improve your overall health and encourage you to meet your goals for a healthier lifestyle.     What we expect from YOU:  · Please take frequent home blood pressure measurements. We ask that you take at least 1 blood pressure reading per week, but more information will better help us get you know you. Be sure you rest for a few minutes before taking the reading in a quiet, comfortable place.     Be available to receive phone calls or [phone] messages, when appropriate, from your care team. Please let us know if there are any specific days or times that work best for us to reach you via phone.     Complete routine tests and screenings. Dont worry, we will help keep you on track!           What you should expect  from your Digital Medicine Care Team:   We will work with you to create a personalized plan of care and provide you with encouragement and education, including regarding lifestyle changes, that could help you manage your disease states.     We will adjust your current medications, if needed, and continue to monitor your long-term progress.     We will provide you and your physician with monthly progress reports after you have been in the program for more than 30 days.     We will send you reminders through [employer] and text messages to help ensure you do not miss any testing deadlines to help manage your disease states.    You will be able to reach us by phone or through your [employer] account by clicking our names under Care Team on the right side of the home screen.    I look forward to working with you to achieve your blood pressure goals!    We look forward to working with you to help manage your health,    Sincerely,    Your Digital Medicine Team    Please visit our websites to learn more:   · Hypertension: [email or web address]      Remember, we are not available for emergencies. If you have an emergency, please contact your doctors office directly or call [facility] ([phone] or [phone]) or 911.

## 2020-06-22 DIAGNOSIS — I49.8 OTHER SPECIFIED CARDIAC ARRHYTHMIAS: Primary | ICD-10-CM

## 2020-06-25 ENCOUNTER — TELEPHONE (OUTPATIENT)
Dept: SLEEP MEDICINE | Facility: OTHER | Age: 55
End: 2020-06-25

## 2020-06-25 NOTE — PROGRESS NOTES
"Digital Medicine: Health  Introduction    Introduced Jhonatan Sofia to Digital Medicine. Discussed health  role and recommended lifestyle modifications.    Enrolled Mr. Sofia into [facility] Digital Medicine program. Introduced myself as pt's health  for the program. Discussed lifestyle goals.     Encouraged limiting sodium intake and discussed foods high in sodium. Encouraged the use of salt free seasonings and gave examples. Pt states he went out to eat recently, and noticed his BP was elevated after. We discussed the sodium in the meal and the impact on BP. Also discussed alcohol's impact on BP. Went over recommended limit of 2 drinks daily for men. Pt does not consume alcohol or go out to eat often.     Encouraged 150 minutes of physical activity weekly (~20-25 minutes daily). Pt says he is only cleared for 30 minutes of exercise right now due to Afib. He says this is hard for him, as he used to bike for 2 hours on the weekend.     Pt states this is all "very new" to him. He reports starting taking BP medication on the 22nd. He is taking notes at home so he can relate lifestyle factors to his readings. Gave encouragement and support. Encouraged pt to reach out with questions or concerns.        The history is provided by the patient.     HYPERTENSION  Our goal is to get BP to consistently below 130/80mmHg and make the process convenient so patient can avoid extra trips to the office. Getting your blood pressure below 130/80mmHg (definition of control) will reduce your risk for heart attack, kidney failure, stroke and death (as well as kidney failure, eye disease, & dementia)      Reviewed that the Digital Medicine care team - consisting of a clinician and a health  - will follow the most current evidence-based national guidelines for treating your condition.  The health  will focus on lifestyle modifications and motivation while the clinician will focus on medication therapy.  The care team " will review all data on a regular basis and reach out as needed.      Explained that one of the key parts of the program is communication with the care team.  Asked patient to respond to outreach attempts and complete questionnaires.  Stressed importance of medication adherence.    Reviewed non-pharmacologic therapies and impact on BP.      Explained that we expect patient to obtain several blood pressures per week at random times of day.  Instructed patient not to allow anyone else to use phone and monitoring device.  Confirmed appropriate BP monitoring technique.      Patient's BP goal is 130/80.Patient's BP average is 125/92 mmHg, which is above goal, per 2017 ACC/AHA Hypertension Guidelines.          Last 5 Patient Entered Readings                                      Current 30 Day Average: 125/92     Recent Readings 6/25/2020 6/25/2020 6/25/2020 6/25/2020 6/25/2020    SBP (mmHg) 124 124 129 123 143    DBP (mmHg) 90 96 91 83 100    Pulse 56 62 57 49 52            INTERVENTION(S)  recommended diet modifications, recommend physical activity, reviewed monitoring technique, encouragement/support and goal setting    PLAN  patient verbalizes understanding, patient amenable to changes and continue monitoring      There are no preventive care reminders to display for this patient.    Reviewed the importance of self-monitoring, medication adherence, and that the health  can be used as a resource for lifestyle modifications to help reduce or maintain a healthy lifestyle.    Sent link to [facility] webpages and my contact information via [employer] for future questions. Follow up scheduled.             Diet Screening   He has the following dietary restrictions: low sodium diet    Intervention(s): low sodium diet education, reducing sodium intake, reducing processed foods and reducing seasonings    Physical Activity Screening       Intervention(s): goal setting and goal tracking       SDOH

## 2020-06-26 ENCOUNTER — TELEPHONE (OUTPATIENT)
Dept: ELECTROPHYSIOLOGY | Facility: CLINIC | Age: 55
End: 2020-06-26

## 2020-06-29 ENCOUNTER — TELEPHONE (OUTPATIENT)
Dept: ELECTROPHYSIOLOGY | Facility: CLINIC | Age: 55
End: 2020-06-29

## 2020-06-30 ENCOUNTER — TELEPHONE (OUTPATIENT)
Dept: SLEEP MEDICINE | Facility: OTHER | Age: 55
End: 2020-06-30

## 2020-07-01 ENCOUNTER — TELEPHONE (OUTPATIENT)
Dept: ELECTROPHYSIOLOGY | Facility: CLINIC | Age: 55
End: 2020-07-01

## 2020-07-06 ENCOUNTER — TELEPHONE (OUTPATIENT)
Dept: ELECTROPHYSIOLOGY | Facility: CLINIC | Age: 55
End: 2020-07-06

## 2020-07-06 NOTE — TELEPHONE ENCOUNTER
Lvm to confirm upcoming appt with  office . We need medical Records from any Cardiology  outside of [facility] network & if any implanted devices.

## 2020-07-07 ENCOUNTER — HOSPITAL ENCOUNTER (OUTPATIENT)
Dept: CARDIOLOGY | Facility: CLINIC | Age: 55
Discharge: HOME | End: 2020-07-07
Payer: COMMERCIAL

## 2020-07-07 ENCOUNTER — TELEPHONE (OUTPATIENT)
Dept: SLEEP MEDICINE | Facility: OTHER | Age: 55
End: 2020-07-07

## 2020-07-07 ENCOUNTER — INITIAL CONSULT (OUTPATIENT)
Dept: ELECTROPHYSIOLOGY | Facility: CLINIC | Age: 55
End: 2020-07-07
Payer: COMMERCIAL

## 2020-07-07 VITALS
WEIGHT: 222.88 LBS | BODY MASS INDEX: 30.19 KG/M2 | HEART RATE: 60 BPM | SYSTOLIC BLOOD PRESSURE: 100 MMHG | DIASTOLIC BLOOD PRESSURE: 70 MMHG | HEIGHT: 72 IN

## 2020-07-07 DIAGNOSIS — I48.91 ATRIAL FIBRILLATION, UNSPECIFIED TYPE: Primary | ICD-10-CM

## 2020-07-07 DIAGNOSIS — I49.8 OTHER SPECIFIED CARDIAC ARRHYTHMIAS: ICD-10-CM

## 2020-07-07 DIAGNOSIS — I48.19 PERSISTENT ATRIAL FIBRILLATION: Primary | ICD-10-CM

## 2020-07-07 DIAGNOSIS — I10 ESSENTIAL HYPERTENSION: ICD-10-CM

## 2020-07-07 PROCEDURE — 3008F PR BODY MASS INDEX (BMI) DOCUMENTED: ICD-10-PCS | Mod: CPTII,S$GLB,, | Performed by: INTERNAL MEDICINE

## 2020-07-07 PROCEDURE — 3078F DIAST BP <80 MM HG: CPT | Mod: CPTII,S$GLB,, | Performed by: INTERNAL MEDICINE

## 2020-07-07 PROCEDURE — 93005 ELECTROCARDIOGRAM TRACING: CPT | Mod: S$GLB,,, | Performed by: INTERNAL MEDICINE

## 2020-07-07 PROCEDURE — 3078F PR MOST RECENT DIASTOLIC BLOOD PRESSURE < 80 MM HG: ICD-10-PCS | Mod: CPTII,S$GLB,, | Performed by: INTERNAL MEDICINE

## 2020-07-07 PROCEDURE — 99999 PR PBB SHADOW E&M-EST. PATIENT-LVL IV: CPT | Mod: PBBFAC,,, | Performed by: INTERNAL MEDICINE

## 2020-07-07 PROCEDURE — 99214 PR OFFICE/OUTPT VISIT, EST, LEVL IV, 30-39 MIN: ICD-10-PCS | Mod: S$GLB,,, | Performed by: INTERNAL MEDICINE

## 2020-07-07 PROCEDURE — 3074F SYST BP LT 130 MM HG: CPT | Mod: CPTII,S$GLB,, | Performed by: INTERNAL MEDICINE

## 2020-07-07 PROCEDURE — 3008F BODY MASS INDEX DOCD: CPT | Mod: CPTII,S$GLB,, | Performed by: INTERNAL MEDICINE

## 2020-07-07 PROCEDURE — 3074F PR MOST RECENT SYSTOLIC BLOOD PRESSURE < 130 MM HG: ICD-10-PCS | Mod: CPTII,S$GLB,, | Performed by: INTERNAL MEDICINE

## 2020-07-07 PROCEDURE — 99214 OFFICE O/P EST MOD 30 MIN: CPT | Mod: S$GLB,,, | Performed by: INTERNAL MEDICINE

## 2020-07-07 PROCEDURE — 93010 RHYTHM STRIP: ICD-10-PCS | Mod: S$GLB,,, | Performed by: INTERNAL MEDICINE

## 2020-07-07 PROCEDURE — 93005 RHYTHM STRIP: ICD-10-PCS | Mod: S$GLB,,, | Performed by: INTERNAL MEDICINE

## 2020-07-07 PROCEDURE — 93010 ELECTROCARDIOGRAM REPORT: CPT | Mod: S$GLB,,, | Performed by: INTERNAL MEDICINE

## 2020-07-07 PROCEDURE — 99999 PR PBB SHADOW E&M-EST. PATIENT-LVL IV: ICD-10-PCS | Mod: PBBFAC,,, | Performed by: INTERNAL MEDICINE

## 2020-07-07 RX ORDER — ASPIRIN 81 MG/1
81 TABLET ORAL DAILY
COMMUNITY
End: 2020-10-06

## 2020-07-07 NOTE — PROGRESS NOTES
Subjective:    Patient ID:  Jhonatan Sofia is a 55 y.o. male who presents for evaluation of Atrial Fibrillation    Referring Cardiologists: Justus Mitchell MD and Kenia Walden MD  Primary Care Physician: Jose Sanchez MD    HPI  I had the pleasure of seeing Mr. Sofia today in our electrophysiology clinic in consultation for his new onset persistent atrial fibrillation. 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 He works at [employer] and gets yearly on-site physical exams with electrocardiograms. He reports they have been normal until this past March of 2020 when he was diagnosed with atrial fibrillation. He had no obvious symptoms however his wife and daughter report some increased fatigue that they have noted. He was seen by Dr. Sanchez who referred him to general cardiology. Low dose metoprolol and eliquis were prescribed and he was referred for further evaluation. He has yet to start eliquis.    He exercises regularly with heart rate monitoring and notes at times he sustains his heart rate in the 180s-200 bpm range. He does not use exercise supplements, does not drink alcohol, and wife reports no symptoms consistent with sleep apnea. He had an exercise stress echocardiogram in 2017 which noted no ischemia and showed a structurally normal left ventricle with mild left atrial enlargement (LVEF 55%). A recent echocardiogram noted a LVEF of 50% with a severely enlarged left atrium. A recent TSH lab test indicated a euthyroid state.    I reviewed available electrocardiograms in Epic which show normal sinus rhythm until an ECG dated 5/14/2020 which showed atrial fibrillation with controlled ventricular response.    My interpretation of today's in clinic ECG is AF with an average ventricular rate of 60 bpm.        Review of Systems   Constitution: Negative for fever and malaise/fatigue.   HENT:  Negative for congestion and sore throat.    Eyes: Negative for blurred vision and visual disturbance.   Cardiovascular: Negative for chest pain, dyspnea on exertion, irregular heartbeat, near-syncope, orthopnea, palpitations, paroxysmal nocturnal dyspnea and syncope.   Respiratory: Negative for cough and shortness of breath.    Hematologic/Lymphatic: Negative for bleeding problem. Does not bruise/bleed easily.   Skin: Negative.    Musculoskeletal: Negative.    Gastrointestinal: Negative for bloating, abdominal pain, hematochezia and melena.   Neurological: Negative for focal weakness and weakness.        Objective:    Physical Exam   Constitutional: He is oriented to person, place, and time. He appears well-developed and well-nourished. No distress.   HENT:   Head: Normocephalic and atraumatic.   Eyes: Conjunctivae are normal. Right eye exhibits no discharge. Left eye exhibits no discharge.   Neck: Neck supple. No JVD present.   Cardiovascular: Normal rate. An irregularly irregular rhythm present.   Pulmonary/Chest: Effort normal and breath sounds normal. No respiratory distress. He has no wheezes. He has no rales.   Abdominal: Soft. Bowel sounds are normal.   Musculoskeletal:         General: No edema.   Neurological: He is alert and oriented to person, place, and time.   Skin: Skin is warm and dry. He is not diaphoretic.   Psychiatric: He has a normal mood and affect. His behavior is normal. Judgment and thought content normal.   Vitals reviewed.        Assessment:       1. Persistent atrial fibrillation    2. Essential hypertension         Plan:       In summary, Mr. 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 I had a long discussion with the patient about the pathophysiology and risks of atrial fibrillation and its basic pathophysiology, including its health implications and treatment options. Specifically, I addressed  the need for CVA (stroke) prophylaxis with aspirin versus oral anticoagulation (warfarin vs DOACs, discussed bleeding risks, and need to come to the ER for any head trauma for CT scanning even if asymptomatic). His LORTN1DRIy score is 1 and we discussed that anticoagulation long-term is a risk-benefit shared decision analysis. I also discussed the goal to reduce symptomatic arrhythmic episodes by pharmacologic and/or procedural methods and utilizing a rhythm versus a rate control strategy. He may have latent symptoms per his family (fatigue). Discussed regardless at his age and activity level as well as this being the first documented event I strongly recommend an attempt at restoration of sinus rhythm to assess symptomatic response. He understood and agreed. Discussed need to take eliquis prior and 4 weeks after cardioversion. He can then decide on whether he wants to continue it long-term. If needed would use flecainide for rhythm control. Discussed risk atrial fibrillation in high-endurance athletes and advised to limit his exercise effort to maintain acceptable age-related exercise heart rate.    Plan  RADHIKA/DCCV  Start eliquis 5mg bid    Thank you for allowing me to participate in the care of this patient. Please do not hesitate to call me with any questions or concerns.    Scotty Shell MD, PhD  Cardiac Electrophysiology

## 2020-07-07 NOTE — PATIENT INSTRUCTIONS
Understanding Atrial Fibrillation    An arrhythmia is any problem with the speed or pattern of the heartbeat. Atrial fibrillation (AFib) is a common type of arrhythmia. It causes fast, chaotic electrical signals in the atria. This leads to poor functioning of the heart. It also affects how much blood your heart can pump out to the body.  Afib may occur once in a while and go away on its own. Or it may continue for longer periods and need treatment.  AFib can lead to serious problems, such as stroke. Your healthcare provider will need to monitor and manage it.  What happens during atrial fibrillation?   The heart has an electrical system that sends signals to control the heartbeat. As signals move through the heart, they tell the hearts upper chambers (atria) and lower chambers (ventricles) when to squeeze (contract) and relax. This lets blood move through the heart and out to the body and lungs.  With AFib, the atria receive abnormal signals. This causes them to contract in a fast and irregular way, and out of sync with the ventricles. When this happens, the atria also have a harder time moving blood into the ventricles. Blood may then pool in the atria, which increases the risk for blood clots and stroke. The ventricles also may contract too quickly and irregularly. As a result, they may not pump blood to the body and lungs as well as they should. This can weaken the heart muscle over time and cause heart failure.  What causes atrial fibrillation?  AFib is more common in older adults. It has many possible causes including:  · Coronary artery disease  · Heart valve disease  · Heart attack  · Heart surgery  · High blood pressure  · Thyroid disease  · Diabetes  · Lung disease  · Sleep apnea  · Heavy alcohol use  In some cases of AFib, doctors do not know the cause.  What are the symptoms of atrial fibrillation?  AFib may or may not cause symptoms. If symptoms do occur, they may include:  · A fast, pounding,  irregular heartbeat  · Shortness of breath  · Tiredness  · Dizziness or fainting  · Chest pain  How is atrial fibrillation treated?  Treatments for AFib can include any of the options below.  · Medicines. You may be prescribed:  ¨ Heart rate medicines to help slow down the heartbeat  ¨ Heart rhythm medicines to help the heart beat more regularly  ¨ Anti-clotting medicines to help reduce the risk for blood clots and stroke  · Electrical cardioversion. Your healthcare provider uses special pads or paddles to send one or more brief electrical shocks to the heart. This can help reset the heartbeat to normal.  · Ablation. Long, thin tubes called catheters are threaded through a blood vessel to the heart. There, the catheters send out hot or cold energy to the areas causing the abnormal signals. This energy destroys the problem tissue or cells. This improves the chances that your heart will stay in normal rhythm without using medicines. If your heart rate and rhythm cant be controlled, you may need ablation and a pacemaker. These will help control the heart rate and regularity of the heartbeat.  · Surgery. During surgery, your healthcare provider may use different methods to create scar tissue in the areas of the heart causing the abnormal signals. The scar tissue disrupts the abnormal signals and may stop AFib from occurring.  What are the complications of atrial fibrillation?  These can include:  · Blood clots  · Stroke  · Heart failure. This problem occurs when the heart muscle weakens so much that it can no longer pump blood well.  When should I call my healthcare provider?  Call your healthcare provider right away if you have any of these:  · Symptoms that dont get better with treatment, or get worse  · New symptoms   Date Last Reviewed: 5/1/2016  © 7807-0022 [employer]. [address] [place] [address], [place] [address]. All rights reserved. This information is not intended as a substitute for professional  medical care. Always follow your healthcare professional's instructions.        Living with Atrial Fibrillation: Preventing Stroke  Atrial fibrillation (AFib) is the most common abnormal heart rhythm in the world. The heart has 2 upper chambers called atria and 2 lower chambers called ventricles. AFib causes the atria to quiver (fibrillate) instead of pumping normally. Blood can then pool in the heart instead of moving in and out as usual. This can cause blood clots to form inside the heart. A clot can break free, travel to the brain and cause a stroke. A stroke can cause brain damage very quickly.    Taking medicine to prevent stroke  Your healthcare provider may prescribe a medicine to help prevent blood clots. This type of medicine is called a blood thinner. Blood thinners include:  · Antiplatelet medicines, such as aspirin or clopidrogrel  · Anticoagulation medicines, such as warfarin, dabigatran, rivaroxaban, apixaban, or edoxaban  Risks of blood thinner medicine  Blood thinners increase your risk of bleeding. If you take certain blood thinners, you may need to take extra steps to stay healthy. You may need regular blood tests to check the levels of medicine in your blood. Youll need to be careful not to injure yourself. And you may need to watch your diet for foods that affect blood clotting.  If your blood is too thin, you may have symptoms of excess bleeding, such as:  · Unusual bruising  · Bleeding from the gums  · Blood in the urine or stool  · Black stools  · Vomiting blood  · Nosebleeds  · An unusual or severe headache  Taking the right dose  Youll need to make sure to take the medicine exactly as directed by your healthcare provider. Take it at the same time each day. If you miss a dose, call your provider right away to find out how much to take. Never take a double dose. If you take too much, it can cause too much bleeding. It can cause bleeding you can see, on the outside of your body. And it can  cause bleeding on the inside of your body that you may not be aware of.  Getting your blood tested  Depending on which blood thinner you take, you may need to have your blood tested on a regular schedule. This is to make sure you dont have too much or too little of the medicine in your blood. Too much can cause excess bleeding. Too little may not prevent blood clots from harming you.  You may need to visit a hospital or clinic every week to have your blood tested. Or a nurse may come to your home and test your blood. In some cases, you may be able to test your blood at home with a small machine. Talk with your healthcare provider to find out whats best for you. After the blood test, your healthcare provider may tell you to change your dose of medicine.  Watching your diet  Some foods can affect how certain blood thinners work. In particular, warfarin levels are sensitive to your diet. For example, many foods contain vitamin K. Vitamin K is a substance that helps your blood clot. You dont need to avoid foods that have vitamin K. But you do need to keep the amount of them you eat steady as possible from day to day. Examples of foods high in vitamin K are asparagus, avocado, broccoli, cabbage, kale, spinach, and some other leafy green vegetables. Oils, such as soybean, canola, and olive, are also high in vitamin K.  Other foods and drinks can affect the way blood thinners work in your body. These include:  · Grapefruit and grapefruit juice  · Cranberries and cranberry juice  · Fish oil supplements  · Garlic, allison, licorice, and turmeric  · Herbs used in herbal teas or supplements  · Alcohol  If any of these items are part of your regular diet, continue using them as you normally would. Dont make any major changes in your diet without first talking with your healthcare provider.  You may also need to limit fats in your diet to 2 to 4 tablespoons a day.  Preventing injury  Because blood thinners make you bleed  more, youll need to protect yourself from breaks in the skin. Follow these guidelines:  · Don't go barefoot - always wear shoes.  · Don't trim corns or calluses yourself.  · Consider using an electric razor instead of a manual one.  · Use a soft-bristled toothbrush and waxed dental floss.  Youll also need to avoid any activities that may cause injury. If you fall or are injured, you could be bleeding inside your body and not know it. Make sure to get medical attention right away if you fall, hit your head, or have any other kind of injury.  Other safety tips  While on your medicine, be sure to:  · Tell all of your healthcare providers that you take a blood thinner for AFib. This includes all of your doctors, dental care providers, and your pharmacist.  · Ask your doctor before taking any new medicines, vitamins, or other supplements. Any of these can cause problems when you take a blood thinner.  · Wear a medical alert bracelet or carry an ID card in your wallet if you will be taking blood thinners for months or longer.  · Keep all appointments for your blood tests.  Procedures to prevent stroke  Most blood clots that form in the heart occur in a pouch of the left atrium called the appendage. This pouch can often be large and have multiple lobes which can permit blood pooling and clot formation. Left atrial appendage closure is a nonsurgical procedure in which a self-expanding plug is placed at the opening of the left atrial appendage to close off the appendage from the rest of the heart. Once the plug has fully sealed, no blood can enter or leave the appendage. This reduces blood clot formation and stroke risk. Ask your doctor if you qualify for this type of procedure.  Other ways to help prevent stroke  Your healthcare provider might give you other advice about how to lower your risk for stroke, such as:  · Lowering your cholesterol with lifestyle changes or medicine  · Not smoking  · Getting physical  activity  · Losing weight if needed  · Eating a heart-healthy diet  · Not drinking too much alcohol     When to call your healthcare provider  Call your healthcare provider right away if you have any of these:  · Unusual or severe headache  · Confusion, weakness, or numbness  · Loss of vision  · Difficulty with speech  · Bleeding that wont stop  · Coughing or vomiting blood  · Bright red blood in the stool  · Fall or injury to the head  · Symptoms of atrial fibrillation that are new or getting worse   Date Last Reviewed: 5/1/2016  © 7251-9591 [employer]. [address] [place] [address]. All rights reserved. This information is not intended as a substitute for professional medical care. Always follow your healthcare professional's instructions.

## 2020-07-07 NOTE — LETTER
July 7, 2020      Justus Mitchell MD  [address]  [facility] [address]           [facility] - Arrhythmia  [address]  [facility] [address]  Phone: [phone]  Fax: [phone]          Patient: Jhonatan Sofia   MR Number: [id number]   YOB: 1965   Date of Visit: 7/7/2020       Dear Dr. Justus Mitchell:    Thank you for referring Jhonatan Sofia to me for evaluation. Attached you will find relevant portions of my assessment and plan of care.    If you have questions, please do not hesitate to call me. I look forward to following Jhonatan Sofia along with you.    Sincerely,    Scotty Shell MD    Enclosure  CC:  No Recipients    If you would like to receive this communication electronically, please contact [email or web address] or [phone] to request more information on [employer] access.    For providers and/or their staff who would like to refer a patient to [facility], please contact us through our one-stop-shop provider referral line, [facility], at [phone].    If you feel you have received this communication in error or would no longer like to receive these types of communications, please e-mail [email or web address]

## 2020-07-13 ENCOUNTER — TELEPHONE (OUTPATIENT)
Dept: SLEEP MEDICINE | Facility: OTHER | Age: 55
End: 2020-07-13

## 2020-07-13 NOTE — TELEPHONE ENCOUNTER
Left messages to schedule the home sleep study,no response.  Sent out a message through [employer] to schedule.

## 2020-07-27 ENCOUNTER — TELEPHONE (OUTPATIENT)
Dept: SLEEP MEDICINE | Facility: OTHER | Age: 55
End: 2020-07-27

## 2020-07-27 NOTE — TELEPHONE ENCOUNTER
Left messages and sent out a message through [employer] to schedule the home sleep study.  No response.

## 2020-07-30 ENCOUNTER — PATIENT OUTREACH (OUTPATIENT)
Dept: OTHER | Facility: OTHER | Age: 55
End: 2020-07-30

## 2020-07-30 ENCOUNTER — LAB VISIT (OUTPATIENT)
Dept: LAB | Facility: HOSPITAL | Age: 55
End: 2020-07-30
Attending: INTERNAL MEDICINE
Payer: COMMERCIAL

## 2020-07-30 DIAGNOSIS — I48.91 ATRIAL FIBRILLATION, UNSPECIFIED TYPE: ICD-10-CM

## 2020-07-30 LAB
ANION GAP SERPL CALC-SCNC: 7 MMOL/L (ref 8–16)
BASOPHILS # BLD AUTO: 0.03 K/UL (ref 0–0.2)
BASOPHILS NFR BLD: 0.5 % (ref 0–1.9)
BUN SERPL-MCNC: 24 MG/DL (ref 6–20)
CALCIUM SERPL-MCNC: 9.8 MG/DL (ref 8.7–10.5)
CHLORIDE SERPL-SCNC: 109 MMOL/L (ref 95–110)
CO2 SERPL-SCNC: 28 MMOL/L (ref 23–29)
CREAT SERPL-MCNC: 1.4 MG/DL (ref 0.5–1.4)
DIFFERENTIAL METHOD: ABNORMAL
EOSINOPHIL # BLD AUTO: 0 K/UL (ref 0–0.5)
EOSINOPHIL NFR BLD: 0.5 % (ref 0–8)
ERYTHROCYTE [DISTWIDTH] IN BLOOD BY AUTOMATED COUNT: 13.2 % (ref 11.5–14.5)
EST. GFR  (AFRICAN AMERICAN): >60 ML/MIN/1.73 M^2
EST. GFR  (NON AFRICAN AMERICAN): 57 ML/MIN/1.73 M^2
GLUCOSE SERPL-MCNC: 98 MG/DL (ref 70–110)
HCT VFR BLD AUTO: 42.6 % (ref 40–54)
HGB BLD-MCNC: 14 G/DL (ref 14–18)
IMM GRANULOCYTES # BLD AUTO: 0.01 K/UL (ref 0–0.04)
IMM GRANULOCYTES NFR BLD AUTO: 0.2 % (ref 0–0.5)
LYMPHOCYTES # BLD AUTO: 2.3 K/UL (ref 1–4.8)
LYMPHOCYTES NFR BLD: 40.7 % (ref 18–48)
MCH RBC QN AUTO: 32.1 PG (ref 27–31)
MCHC RBC AUTO-ENTMCNC: 32.9 G/DL (ref 32–36)
MCV RBC AUTO: 98 FL (ref 82–98)
MONOCYTES # BLD AUTO: 0.5 K/UL (ref 0.3–1)
MONOCYTES NFR BLD: 9.1 % (ref 4–15)
NEUTROPHILS # BLD AUTO: 2.8 K/UL (ref 1.8–7.7)
NEUTROPHILS NFR BLD: 49 % (ref 38–73)
NRBC BLD-RTO: 0 /100 WBC
PLATELET # BLD AUTO: 214 K/UL (ref 150–350)
PMV BLD AUTO: 11.1 FL (ref 9.2–12.9)
POTASSIUM SERPL-SCNC: 4.1 MMOL/L (ref 3.5–5.1)
RBC # BLD AUTO: 4.36 M/UL (ref 4.6–6.2)
SODIUM SERPL-SCNC: 144 MMOL/L (ref 136–145)
WBC # BLD AUTO: 5.62 K/UL (ref 3.9–12.7)

## 2020-07-30 PROCEDURE — 85610 PROTHROMBIN TIME: CPT

## 2020-07-30 PROCEDURE — 85730 THROMBOPLASTIN TIME PARTIAL: CPT

## 2020-07-30 PROCEDURE — 85025 COMPLETE CBC W/AUTO DIFF WBC: CPT

## 2020-07-30 PROCEDURE — 36415 COLL VENOUS BLD VENIPUNCTURE: CPT

## 2020-07-30 PROCEDURE — 80048 BASIC METABOLIC PNL TOTAL CA: CPT

## 2020-07-30 NOTE — PROGRESS NOTES
Digital Medicine: Clinician Introduction    Jhonatan Sofia is a 55 y.o. male who is newly enrolled in the Digital Medicine Clinic.    Pt reports doing well but is concerned about some lower readings recently.    The history is provided by the patient.      Review of patient's allergies indicates:  No Known Allergies    HYPERTENSION    Explained non-pharmacologic therapies like low salt diet and physical activity can reduce blood pressure.       Explained that we expect patient to submit several blood pressure readings per week at random times of the day, but at least 30 minutes after taking blood pressure medications. Instructed patient not to allow anyone else to use their blood pressure monitor and phone as data submitted is directly entered into medical record. Reviewed and confirmed appropriate blood pressure monitoring technique.         Reviewed signs/symptoms of hypotension (lightheaded, dizziness, weakness)     Patient's BP goal is less than or equal to 130/80. Patients BP average is 112/83 mmHg, which is at goal, per 2017 ACC/AHA Hypertension Guidelines. Notes low readings after doing a workout a couple days in a row.  Has been holding metoprolol for the past 24 hrs.  Denies associated symptoms.  Cardioversion on Aug 6.      Last 5 Patient Entered Readings                                      Current 30 Day Average: 112/83     Recent Readings 7/30/2020 7/30/2020 7/30/2020 7/29/2020 7/29/2020    SBP (mmHg) 105 104 115 107 109    DBP (mmHg) 74 83 74 79 85    Pulse 71 62 74 89 83              Depression Screening  Did not address depression screening.    Sleep Apnea Screening    Did not address sleep apnea screening.     Medication Affordability Screening  Did not address medication affordability screening.     Medication Adherence-Medication adherence was assessed.    Patient identified the following reasons for non-adherence:  Side effects from medication: low BP readings    Adherence tools used:  None      ASSESSMENT(S)  Patients BP average is 112/83 mmHg, which is at goal. Patient's BP goal is less than or equal to 130/80 per 2017 ACC/AHA Hypertension Guidelines.      PLAN  Medication change: Continue to hold metoprlol w/ close BP monitoring.  Persistent low readings likely to get in the way of upcoming cardioversion.  Will restart metoprolol if needed.  Additional monitoring needed: Daily BP readings    Patient verbalizes understanding. Patient did not express questions or concerns and patient has contact information if needed.    Explained the importance of self-monitoring and medication adherence. Encouraged the patient to communicate with their health  for lifestyle modifications to help improve or maintain a healthy lifestyle.      Sent link to [facility] webpages and my contact information via [employer] for future questions.      Explained to the patient that the Digital Medicine team is not available for emergencies. Advised patient call [facility] ([phone] or [phone]) or [phone] if needed.         There are no preventive care reminders to display for this patient.    Current Medication Regimen:  Hypertension Medications             metoprolol tartrate (LOPRESSOR) 25 MG tablet Take 1 tablet (25 mg total) by mouth 2 (two) times daily.

## 2020-07-30 NOTE — PROGRESS NOTES
Digital Medicine: Health  Follow-Up    The history is provided by the patient.             Reason for review: Blood pressure at goal        Topics Covered on Call: physical activity and Diet    Additional Follow-up details: Called pt for follow up. Average /83. Pt reports concern over lower BP readings. Pt reports skipping his next dose of medication when BP is low. He is looking for further guidance on medication. Will inform clinician. Encouraged pt to consume a salty snack, drink plenty of fluids, and move around slowly if BP <90/60 with hypotension symptoms.    Pt reports he lost ~12-13 lbs since starting the program.     Gave praise for at goal readings, and encouraged continuing with lifestyle modifications.            Diet-no change to diet    No change to diet.  Patient reports eating or drinking the following: Pt continues to consume a low sodium diet. He says he consumes a lot of vegetables, and protein foods. He is not using salt on foods. He does not snack often and rarely has energy or electrolyte drinks.     and Pt says he notices his BP is elevated after consuming Mexican food. Discussed sodium content in Mexican food.     Intervention(s): DASH diet    Additional diet details:    Physical Activity-no change to routine  No change to exercise routine.       Additional physical activity details: Pt continues to exercise daily. He walks and runs for ~30 minutes.      Medication Adherence-Medication adherence was assessed.        PLAN  Continue current diet/physical activity routine:  Provided patient education:    Patient verbalizes understanding. Patient did not express questions or concerns and patient has contact information if needed.    Explained the importance of self-monitoring and medication adherence. Encouraged the patient to communicate with their health  for lifestyle modifications to help improve or maintain a healthy lifestyle.        There are no preventive care reminders to  display for this patient.    Last 5 Patient Entered Readings                                      Current 30 Day Average: 112/83     Recent Readings 7/30/2020 7/30/2020 7/30/2020 7/29/2020 7/29/2020    SBP (mmHg) 105 104 115 107 109    DBP (mmHg) 74 83 74 79 85    Pulse 71 62 74 89 83

## 2020-07-31 LAB
APTT BLDCRRT: 29.6 SEC (ref 21–32)
INR PPP: 1 (ref 0.8–1.2)
PROTHROMBIN TIME: 10.7 SEC (ref 9–12.5)

## 2020-08-03 ENCOUNTER — LAB VISIT (OUTPATIENT)
Dept: SPORTS MEDICINE | Facility: CLINIC | Age: 55
End: 2020-08-03
Payer: COMMERCIAL

## 2020-08-03 DIAGNOSIS — I48.91 ATRIAL FIBRILLATION, UNSPECIFIED TYPE: ICD-10-CM

## 2020-08-03 PROCEDURE — U0003 INFECTIOUS AGENT DETECTION BY NUCLEIC ACID (DNA OR RNA); SEVERE ACUTE RESPIRATORY SYNDROME CORONAVIRUS 2 (SARS-COV-2) (CORONAVIRUS DISEASE [COVID-19]), AMPLIFIED PROBE TECHNIQUE, MAKING USE OF HIGH THROUGHPUT TECHNOLOGIES AS DESCRIBED BY CMS-2020-01-R: HCPCS

## 2020-08-04 LAB — SARS-COV-2 RNA RESP QL NAA+PROBE: NOT DETECTED

## 2020-08-05 ENCOUNTER — TELEPHONE (OUTPATIENT)
Dept: ELECTROPHYSIOLOGY | Facility: CLINIC | Age: 55
End: 2020-08-05

## 2020-08-05 NOTE — TELEPHONE ENCOUNTER
----- Message from Bharati Pisano RN sent at 8/5/2020  4:52 PM CDT -----  Regarding: FW: returning a call  Contact: patient    ----- Message -----  From: Zoe Ugalde  Sent: 8/5/2020   4:35 PM CDT  To: [facility] Staff  Subject: returning a call                                 The pt is returning a call. Please call him back @ [phone]. Thanks, Zoe

## 2020-08-05 NOTE — TELEPHONE ENCOUNTER
Spoke to Mr. Sofia.    CONFIRMED procedure scheduled on 8/6/2020 with 0700 arrival time.     -Elective procedure COVID 19 Testing is negative.   -Advised of current visitor policy which allows for each patient to have one adult visitor pre and post procedure.  -Pre-procedure labs reviewed and nothing of concern noted.   -NPO after midnight night prior to procedure.  -Confirmed compliance of Eliquis and BID dosing.    -Confirmed no recent fever/cough/sob, yeast infection, open sores or skin rash present.    Mr. Sofia verbalizes understanding of above and will call our office with any questions or concerns.

## 2020-08-05 NOTE — TELEPHONE ENCOUNTER
Attempting x 2 to reach Mr. Sofia to confirm RADHIKA/DCCV scheduled tomorrow.  No answer.  Message left reminding him of arrival time of 0700, nothing to eat or drink after midnight and check in will be on the 3rd floor of [facility].  Asked he call our office with any questions or concerns.  Office number also left.

## 2020-08-06 ENCOUNTER — ANESTHESIA (OUTPATIENT)
Dept: MEDSURG UNIT | Facility: HOSPITAL | Age: 55
End: 2020-08-06
Payer: COMMERCIAL

## 2020-08-06 ENCOUNTER — HOSPITAL ENCOUNTER (OUTPATIENT)
Dept: CARDIOLOGY | Facility: HOSPITAL | Age: 55
Discharge: HOME | End: 2020-08-06
Attending: INTERNAL MEDICINE | Admitting: INTERNAL MEDICINE
Payer: COMMERCIAL

## 2020-08-06 ENCOUNTER — ANESTHESIA EVENT (OUTPATIENT)
Dept: MEDSURG UNIT | Facility: HOSPITAL | Age: 55
End: 2020-08-06
Payer: COMMERCIAL

## 2020-08-06 ENCOUNTER — HOSPITAL ENCOUNTER (OUTPATIENT)
Facility: HOSPITAL | Age: 55
Discharge: HOME | End: 2020-08-06
Attending: INTERNAL MEDICINE | Admitting: INTERNAL MEDICINE
Payer: COMMERCIAL

## 2020-08-06 VITALS
WEIGHT: 220 LBS | SYSTOLIC BLOOD PRESSURE: 118 MMHG | HEART RATE: 50 BPM | BODY MASS INDEX: 29.8 KG/M2 | TEMPERATURE: 98 F | RESPIRATION RATE: 20 BRPM | DIASTOLIC BLOOD PRESSURE: 79 MMHG | HEIGHT: 72 IN | OXYGEN SATURATION: 96 %

## 2020-08-06 VITALS — HEIGHT: 72 IN | WEIGHT: 229 LBS | BODY MASS INDEX: 31.02 KG/M2

## 2020-08-06 DIAGNOSIS — I48.19 PERSISTENT ATRIAL FIBRILLATION: Primary | ICD-10-CM

## 2020-08-06 DIAGNOSIS — I48.91 ATRIAL FIBRILLATION: ICD-10-CM

## 2020-08-06 DIAGNOSIS — I48.11 LONGSTANDING PERSISTENT ATRIAL FIBRILLATION: ICD-10-CM

## 2020-08-06 DIAGNOSIS — I48.91 ATRIAL FIBRILLATION, UNSPECIFIED TYPE: ICD-10-CM

## 2020-08-06 PROCEDURE — D9220A PRA ANESTHESIA: Mod: CRNA,,, | Performed by: STUDENT IN AN ORGANIZED HEALTH CARE EDUCATION/TRAINING PROGRAM

## 2020-08-06 PROCEDURE — 93325 DOPPLER ECHO COLOR FLOW MAPG: CPT

## 2020-08-06 PROCEDURE — 93005 ELECTROCARDIOGRAM TRACING: CPT

## 2020-08-06 PROCEDURE — 25000003 PHARM REV CODE 250: Performed by: NURSE PRACTITIONER

## 2020-08-06 PROCEDURE — 37000009 HC ANESTHESIA EA ADD 15 MINS: Performed by: INTERNAL MEDICINE

## 2020-08-06 PROCEDURE — 93312 TRANSESOPHAGEAL ECHO (TEE) (CUPID ONLY): ICD-10-PCS | Mod: 26,,, | Performed by: INTERNAL MEDICINE

## 2020-08-06 PROCEDURE — 93320 TRANSESOPHAGEAL ECHO (TEE) (CUPID ONLY): ICD-10-PCS | Mod: 26,,, | Performed by: INTERNAL MEDICINE

## 2020-08-06 PROCEDURE — 93325 DOPPLER ECHO COLOR FLOW MAPG: CPT | Mod: 26,,, | Performed by: INTERNAL MEDICINE

## 2020-08-06 PROCEDURE — 93325 TRANSESOPHAGEAL ECHO (TEE) (CUPID ONLY): ICD-10-PCS | Mod: 26,,, | Performed by: INTERNAL MEDICINE

## 2020-08-06 PROCEDURE — D9220A PRA ANESTHESIA: Mod: ANES,,, | Performed by: ANESTHESIOLOGY

## 2020-08-06 PROCEDURE — 37000008 HC ANESTHESIA 1ST 15 MINUTES: Performed by: INTERNAL MEDICINE

## 2020-08-06 PROCEDURE — 93010 EKG 12-LEAD: ICD-10-PCS | Mod: ,,, | Performed by: INTERNAL MEDICINE

## 2020-08-06 PROCEDURE — 92960 PR CARDIOVERSION, ELECTIVE;EXTERN: ICD-10-PCS | Mod: ,,, | Performed by: INTERNAL MEDICINE

## 2020-08-06 PROCEDURE — 92960 CARDIOVERSION ELECTRIC EXT: CPT | Performed by: INTERNAL MEDICINE

## 2020-08-06 PROCEDURE — 92960 CARDIOVERSION ELECTRIC EXT: CPT | Mod: ,,, | Performed by: INTERNAL MEDICINE

## 2020-08-06 PROCEDURE — D9220A PRA ANESTHESIA: ICD-10-PCS | Mod: ANES,,, | Performed by: ANESTHESIOLOGY

## 2020-08-06 PROCEDURE — 93320 DOPPLER ECHO COMPLETE: CPT | Mod: 26,,, | Performed by: INTERNAL MEDICINE

## 2020-08-06 PROCEDURE — 93312 ECHO TRANSESOPHAGEAL: CPT | Mod: 26,,, | Performed by: INTERNAL MEDICINE

## 2020-08-06 PROCEDURE — D9220A PRA ANESTHESIA: ICD-10-PCS | Mod: CRNA,,, | Performed by: STUDENT IN AN ORGANIZED HEALTH CARE EDUCATION/TRAINING PROGRAM

## 2020-08-06 PROCEDURE — 25000003 PHARM REV CODE 250: Performed by: STUDENT IN AN ORGANIZED HEALTH CARE EDUCATION/TRAINING PROGRAM

## 2020-08-06 PROCEDURE — 93010 ELECTROCARDIOGRAM REPORT: CPT | Mod: ,,, | Performed by: INTERNAL MEDICINE

## 2020-08-06 PROCEDURE — 63600175 PHARM REV CODE 636 W HCPCS: Performed by: STUDENT IN AN ORGANIZED HEALTH CARE EDUCATION/TRAINING PROGRAM

## 2020-08-06 RX ORDER — SODIUM CHLORIDE 0.9 % (FLUSH) 0.9 %
10 SYRINGE (ML) INJECTION
Status: DISCONTINUED | OUTPATIENT
Start: 2020-08-06 | End: 2020-08-06 | Stop reason: HOSPADM

## 2020-08-06 RX ORDER — PROPOFOL 10 MG/ML
VIAL (ML) INTRAVENOUS CONTINUOUS PRN
Status: DISCONTINUED | OUTPATIENT
Start: 2020-08-06 | End: 2020-08-06

## 2020-08-06 RX ORDER — PROPOFOL 10 MG/ML
VIAL (ML) INTRAVENOUS
Status: DISCONTINUED | OUTPATIENT
Start: 2020-08-06 | End: 2020-08-06

## 2020-08-06 RX ORDER — SODIUM CHLORIDE 9 MG/ML
INJECTION, SOLUTION INTRAVENOUS CONTINUOUS PRN
Status: DISCONTINUED | OUTPATIENT
Start: 2020-08-06 | End: 2020-08-06

## 2020-08-06 RX ORDER — LIDOCAINE HYDROCHLORIDE 20 MG/ML
INJECTION INTRAVENOUS
Status: DISCONTINUED | OUTPATIENT
Start: 2020-08-06 | End: 2020-08-06

## 2020-08-06 RX ADMIN — SODIUM CHLORIDE: 0.9 INJECTION, SOLUTION INTRAVENOUS at 09:08

## 2020-08-06 RX ADMIN — PROPOFOL 100 MCG/KG/MIN: 10 INJECTION, EMULSION INTRAVENOUS at 09:08

## 2020-08-06 RX ADMIN — PROPOFOL 60 MG: 10 INJECTION, EMULSION INTRAVENOUS at 09:08

## 2020-08-06 RX ADMIN — PROPOFOL 40 MG: 10 INJECTION, EMULSION INTRAVENOUS at 09:08

## 2020-08-06 RX ADMIN — SODIUM CHLORIDE 1000 ML: 0.9 INJECTION, SOLUTION INTRAVENOUS at 07:08

## 2020-08-06 RX ADMIN — LIDOCAINE HYDROCHLORIDE 100 MG: 20 INJECTION, SOLUTION INTRAVENOUS at 09:08

## 2020-08-06 NOTE — PLAN OF CARE
Received report from SREEKANTH Lucio. Patient s/p DCCV, AAOx3. VSS, no c/o pain or discomfort at this time, resp even and unlabored.  Post procedure protocol reviewed with patient. Understanding verbalized. Family members updated via cell phone. Nurse call bell within reach. Will continue to monitor per post procedure protocol.

## 2020-08-06 NOTE — NURSING TRANSFER
Nursing Transfer Note      8/6/2020     Transfer To: ep pacu 3 to cath lab holding 10    Transfer via stretcher    Transfer with none per order    Transported by quang,karen    Medicines sent: silvadene    Chart send with patient: Yes    Notified: spouse    Patient reassessed at: 8/6/2020 1030    Upon arrival to floor: patient oriented to room, call bell in reach and bed in lowest position

## 2020-08-06 NOTE — HPI
Mr. Sofia, 55 year old male with prior history of HTN and recently diagnosed AFib (diagnosed on 3/2020). He was asymptomatic, and he was started on AC and metoprolol for rate control. The plan to proceed to restore his normal rhythm. No prior RADHIKA, neck surgery or radiation. EKG this morning shows afib with slow ventricular response. No history of GI bleed, loose teeth, neck radiation or surgery.     Card Meds:  apixaban 5 mg PO BID, Aspirin 81 mg PO daily and metoprolol tartrate 25 mg PO daily.     5/14/2020 TTE  · Low normal left ventricular systolic function. The estimated ejection fraction is 50%.  · No wall motion abnormalities.  · Mildly reduced right ventricular systolic function.  · Severe left atrial enlargement.  · The estimated PA systolic pressure is 20 mmHg.  · Normal central venous pressure (3 mmHg).  · The sinuses of Valsalva is mildly dilated.  · Atrial fibrillation observed.    RADHIKA ROS  Dysphagia or odynophagia:  No  Liver Disease, esophageal disease, or known varices:  No  Upper GI Bleeding: No  Snoring:  No  Sleep Apnea:  No  Prior neck surgery or radiation:  No  History of anesthetic difficulties:  No  Family history of anesthetic difficulties:  No  Last oral intake:  12 hours ago  Able to move neck in all directions:  No

## 2020-08-06 NOTE — PROGRESS NOTES
12 lead ekg done per md order. nsr noted. marge victoria ep np updating pt in ep pacu 3, verbalizes understanding. Silvadene to chest and back s/p cardioversion. Pt educated on purpose of silvadene ointment, verbalizes understanding. Pt aaox4 follows commands.

## 2020-08-06 NOTE — HOSPITAL COURSE
Mr. Sofia underwent RADHIKA without evidence of SURINDER thrombus.  Proceeded with DCCV 200 J x 1 shock, converting from AF to sinus rhythm. He tolerated the procedure without any acute complications.   Post-DCCV ECG revealing of sinus rhythm 64 bpm.  He recovered in PACU prior to being discharged home in stable condition.

## 2020-08-06 NOTE — ANESTHESIA POSTPROCEDURE EVALUATION
Anesthesia Post Evaluation    Patient: Jhonatan Sofia    Procedure(s) Performed: Procedure(s) (LRB):  CARDIOVERSION (N/A)  Transesophageal echo (RADHIKA) intra-procedure log documentation (N/A)    Final Anesthesia Type: general    Patient location during evaluation: PACU  Patient participation: Yes- Able to Participate  Level of consciousness: awake and alert and oriented  Post-procedure vital signs: reviewed and stable  Pain management: adequate  Airway patency: patent    PONV status at discharge: No PONV  Anesthetic complications: no      Cardiovascular status: blood pressure returned to baseline, hemodynamically stable and stable  Respiratory status: unassisted, room air and spontaneous ventilation  Hydration status: euvolemic  Follow-up not needed.          Vitals Value Taken Time   /79 08/06/20 1045   Temp 36.7 °C (98.1 °F) 08/06/20 1022   Pulse 50 08/06/20 1045   Resp 20 08/06/20 1045   SpO2 96 % 08/06/20 1045         No case tracking events are documented in the log.      Pain/Alex Score: Pain Rating Prior to Med Admin: 0 (8/6/2020 10:14 AM)  Alex Score: 10 (8/6/2020 10:49 AM)

## 2020-08-06 NOTE — SUBJECTIVE & OBJECTIVE
Past Medical History:   Diagnosis Date    Anticoagulant long-term use     Hyperlipidemia     Kidney stones        Past Surgical History:   Procedure Laterality Date    SHOULDER SURGERY         Review of patient's allergies indicates:  No Known Allergies    No current facility-administered medications on file prior to encounter.      Current Outpatient Medications on File Prior to Encounter   Medication Sig    apixaban (ELIQUIS) 5 mg Tab Take 1 tablet (5 mg total) by mouth 2 (two) times daily.    metoprolol tartrate (LOPRESSOR) 25 MG tablet Take 1 tablet (25 mg total) by mouth 2 (two) times daily.    aspirin (ECOTRIN) 81 MG EC tablet Take 81 mg by mouth once daily.     Family History     Problem Relation (Age of Onset)    Arthritis Sister, Sister    Heart disease Father    Kidney failure Father    Liver disease Sister        Tobacco Use    Smoking status: Never Smoker    Smokeless tobacco: Never Used   Substance and Sexual Activity    Alcohol use: Yes     Alcohol/week: 2.0 standard drinks     Types: 2 Cans of beer per week     Frequency: 2-4 times a month     Drinks per session: 1 or 2     Binge frequency: Less than monthly     Comment: minimal    Drug use: Not on file    Sexual activity: Not on file       Objective:     Vital Signs (Most Recent):  Temp: 96.6 °F (35.9 °C) (08/06/20 0719)  Pulse: (!) 47 (08/06/20 0719)  Resp: 18 (08/06/20 0719)  BP: 110/82 (08/06/20 0720)  SpO2: 97 % (08/06/20 0719) Vital Signs (24h Range):  Temp:  [96.6 °F (35.9 °C)] 96.6 °F (35.9 °C)  Pulse:  [47] 47  Resp:  [18] 18  SpO2:  [97 %] 97 %  BP: (110-118)/(82-89) 110/82     Weight: 99.8 kg (220 lb)  Body mass index is 29.84 kg/m².    SpO2: 97 %  O2 Device (Oxygen Therapy): room air    No intake or output data in the 24 hours ending 08/06/20 0817    Lines/Drains/Airways     Peripheral Intravenous Line                 Peripheral IV - Single Lumen 08/06/20 0736 20 G Right Antecubital less than 1 day            Physical  Exam  Vital signs reviewed  Constitutional: Oriented to person, place, and time. Appears  well-developed and well-nourished.   HENT:   Head: Normocephalic and atraumatic.   Eyes: EOM are normal.   Neck: Normal range of motion. No JVD present.   Cardiovascular: Normal rate, irregularly irregular rhythm, normal heart sounds and intact distal pulses.   Exam reveals no gallop and no friction rub.   No murmur heard.  Pulmonary/Chest: Effort normal and breath sounds normal. No wheeze or rales present. No chest wall tenderness  Abdominal: Soft. Bowel sounds are normal. There is no tenderness. There is no guarding.   Musculoskeletal: There is no edema present   Skin: Skin is warm and dry.           Significant Labs: CMP No results for input(s): NA, K, CL, CO2, GLU, BUN, CREATININE, CALCIUM, PROT, ALBUMIN, BILITOT, ALKPHOS, AST, ALT, ANIONGAP, ESTGFRAFRICA, EGFRNONAA in the last 48 hours., CBC No results for input(s): WBC, HGB, HCT, PLT in the last 48 hours. and INR No results for input(s): INR, PROTIME in the last 48 hours.    Significant Imaging: Echocardiogram:   Transthoracic echo (TTE) complete (Cupid Only):   Results for orders placed or performed during the hospital encounter of 05/14/20   Echo Color Flow Doppler? No; Bubble Contrast? No   Result Value Ref Range    BSA 2.29 m2    TDI SEPTAL 0.08 m/s    LV LATERAL E/E' RATIO 3.93 m/s    LV SEPTAL E/E' RATIO 7.38 m/s    LA WIDTH 4.54 cm    TDI LATERAL 0.15 m/s    LVIDD 4.83 3.5 - 6.0 cm    IVS 1.13 (A) 0.6 - 1.1 cm    PW 0.88 0.6 - 1.1 cm    LVIDS 3.52 2.1 - 4.0 cm    FS 27 28 - 44 %    LA volume 114.90 cm3    Sinus 3.95 cm    STJ 3.04 cm    Ascending aorta 3.25 cm    LV mass 173.10 g    LA size 4.36 cm    RVDD 3.50 cm    TAPSE 1.51 cm    Left Ventricle Relative Wall Thickness 0.36 cm    AV mean gradient 3 mmHg    AV valve area 2.28 cm2    AV Velocity Ratio 0.50     AV index (prosthetic) 0.47     E/A ratio 1.64     Mean e' 0.12 m/s    E wave decelartion time 129.18 msec     Pulm vein S/D ratio 0.70     LVOT diameter 2.49 cm    LVOT area 4.9 cm2    LVOT peak umer 0.58 m/s    LVOT peak VTI 10.95 cm    Ao peak umer 1.17 m/s    Ao VTI 23.39 cm    LVOT stroke volume 53.29 cm3    AV peak gradient 5 mmHg    E/E' ratio 5.13 m/s    MV Peak E Umer 0.59 m/s    TR Max Umer 2.06 m/s    MV Peak A Umer 0.36 m/s    PV Peak S Umer 0.30 m/s    PV Peak D Umer 0.43 m/s    LV Systolic Volume 51.67 mL    LV Systolic Volume Index 23.0 mL/m2    LV Diastolic Volume 109.06 mL    LV Diastolic Volume Index 48.51 mL/m2    LA Volume Index 51.1 mL/m2    LV Mass Index 77 g/m2    RA Major Axis 5.64 cm    Left Atrium Minor Axis 6.76 cm    Left Atrium Major Axis 6.90 cm    Triscuspid Valve Regurgitation Peak Gradient 17 mmHg    RA Width 3.50 cm    Right Atrial Pressure (from IVC) 3 mmHg    TV rest pulmonary artery pressure 20 mmHg    Narrative    · Left ventricular systolic function. The estimated ejection fraction is   50%.  · No wall motion abnormalities.  · Mildly reduced right ventricular systolic function.  · Severe left atrial enlargement.  · The estimated PA systolic pressure is 20 mmHg.  · Normal central venous pressure (3 mmHg).  · The sinuses of Valsalva is mildly dilated.  · Atrial fibrillation observed.

## 2020-08-06 NOTE — LETTER
July 27, 2020    Jhonatanryan Sofia  [address] [place] [address]       Patient Instructions  RADHIKA (Transesophageal Echocardiogram) with Cardioversion     Pre-Procedure Testing:     July 30, 2020 - 4:30 pm Blood Work  [facility]  [address] [place] [address]    You do NOT need to fast for this blood work.       August 3, 2020 at 3:50 pm- COVID 19 Nasal Swab  [facility]  [address] [facility] [place] [address]    You do NOT need to fast for this.       Important Medication Information:    · Continue taking Eliquis 5 mg twice daily as prescribed.    · You may take your other usual morning medications with a sip of water on the day of your procedure.       **Prior to your procedure please check your skin and groin area thoroughly for any signs or symptoms of infection such as rashes, open sores, yeast infection, or heat rashes.  If you have any concerns with your skin you should be evaluated by your Primary Care Provider or an Urgent Care for treatment prior to your procedure. Your procedure will be cancelled if not treated prior to the day of your procedure.**      Day of Procedure:    August 6, 2020 at 7:00 am  [facility] [address] [place] [address]    Please report to Cardiology Waiting Room on the 3rd floor of the Hospital    · Do not eat or drink anything after 12 midnight on the night before your procedure.  · You are allowed one adult guest to accompany you pre and post procedure.    · You will be going home after your procedure.  · You will need someone to drive you home from the hospital due to anesthesia.       Post Procedure Testing:    August 13, 2020 at 8:00 am - EKG Only - 3rd Floor - We will call you with test results.    [facility] [address] [place] [address]  You will have a post procedural EKG one week after your cardioversion to assess your heart rhythm and rate.       Directions to Cardiology Waiting  Room  If you park in the Parking Garage:  Take elevators to the 2nd floor  Walk up ramp and turn right by Gold Elevators  Take elevator to the 3rd floor  Upon exiting the elevator, turn away from the clinic areas  Walk long sierra around to front of hospital to area with windows overlooking [facility]  Check in at Reception Desk  OR  If family is dropping you off:  Have them drop you off at the front of the Hospital  (Near the ER, where all the flags are hung).  Take the E elevators to the 3rd floor.  Check in at the Reception Desk in the waiting room.      Any need to reschedule or cancel procedures, or any questions regarding your procedures should be addressed directly with the Arrhythmia Department Nurses at the following phone number: [phone].

## 2020-08-06 NOTE — ANESTHESIA PREPROCEDURE EVALUATION
08/06/2020  Pre-operative evaluation for Procedure(s) (LRB):  CARDIOVERSION (N/A)  Transesophageal echo (RADHIKA) intra-procedure log documentation (N/A)    Jhonatan Sofia is a 55 y.o. 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  He had an exercise stress echocardiogram in 2017 which noted no ischemia and showed a structurally normal left ventricle with mild left atrial enlargement (LVEF 55%).     · Low normal left ventricular systolic function. The estimated ejection fraction is 50%.  · No wall motion abnormalities.  · Mildly reduced right ventricular systolic function.  · Severe left atrial enlargement.  · The estimated PA systolic pressure is 20 mmHg.  · Normal central venous pressure (3 mmHg).  · The sinuses of Valsalva is mildly dilated.  · Atrial fibrillation observed.    Patient Active Problem List   Diagnosis    Essential hypertension    Persistent atrial fibrillation    Atrial fibrillation       Review of patient's allergies indicates:  No Known Allergies    No current facility-administered medications on file prior to encounter.      Current Outpatient Medications on File Prior to Encounter   Medication Sig Dispense Refill    apixaban (ELIQUIS) 5 mg Tab Take 1 tablet (5 mg total) by mouth 2 (two) times daily. 60 tablet 3    metoprolol tartrate (LOPRESSOR) 25 MG tablet Take 1 tablet (25 mg total) by mouth 2 (two) times daily. 60 tablet 11    aspirin (ECOTRIN) 81 MG EC tablet Take 81 mg by mouth once daily.         Past Surgical History:   Procedure Laterality Date    SHOULDER SURGERY         Social History     Socioeconomic History    Marital status:      Spouse name: Not on file    Number of children: 1    Years of education: Not on file    Highest education level: Not on file   Occupational History      Employer: [employer]   Social Needs    Financial resource strain: Not hard at all    Food insecurity     Worry: Never true     Inability: Never true    Transportation needs     Medical: No     Non-medical: No   Tobacco Use    Smoking status: Never Smoker    Smokeless tobacco: Never Used   Substance and Sexual Activity    Alcohol use: Yes     Alcohol/week: 2.0 standard drinks     Types: 2 Cans of beer per week     Frequency: 2-4 times a month     Drinks per session: 1 or 2     Binge frequency: Less than monthly     Comment: minimal    Drug use: Not on file    Sexual activity: Not on file   Lifestyle    Physical activity     Days per week: 4 days     Minutes per session: 60 min    Stress: Not at all   Relationships    Social connections     Talks on phone: More than three times a week     Gets together: More than three times a week     Attends [employer] service: Not on file     Active member of club or organization: No     Attends meetings of clubs or organizations: Never     Relationship status:    Other Topics Concern    Not on file   Social History Narrative    Not on file         CBC: No results for input(s): WBC, RBC, HGB, HCT, PLT, MCV, MCH, MCHC in the last 72 hours.    CMP: No results for input(s): NA, K, CL, CO2, BUN, CREATININE, GLU, MG, PHOS, CALCIUM, ALBUMIN, PROT, ALKPHOS, ALT, AST, BILITOT in the last 72 hours.    INR  No results for input(s): PT, INR, PROTIME, APTT in the last 72 hours.        Diagnostic Studies:      EKG:  Atrial fibrillation   ST elevation, consider early repolarization   When compared with ECG of 14-MAY-2020 14:22,   No significant change was found   Confirmed by ROSALINDA LERMA MD ([id number]) on 6/2/2020 9:48:48 AM     2D Echo:  No results found for this or any previous visit.      Anesthesia Evaluation    I have reviewed the Patient Summary Reports.    I have reviewed the Nursing Notes. I have reviewed the NPO Status.      Review of Systems  Anesthesia Hx:  No problems  with previous Anesthesia  History of prior surgery of interest to airway management or planning: Denies Family Hx of Anesthesia complications.   Denies Personal Hx of Anesthesia complications.   Hematology/Oncology:         -- Denies Anemia:   Cardiovascular:   Exercise tolerance: good Denies CABG/stent.  ECG has been reviewed.    Pulmonary:   Denies COPD.  Denies Asthma.  Denies Sleep Apnea.    Renal/:   Denies Chronic Renal Disease. renal calculi     Hepatic/GI:   Denies GERD. Denies Liver Disease.    Neurological:   Denies CVA. Denies Seizures.    Endocrine:   Denies Diabetes.        Physical Exam  General:  Well nourished    Airway/Jaw/Neck:  Airway Findings: Mouth Opening: Normal Tongue: Normal  General Airway Assessment: Adult  Mallampati: II  Improves to II with phonation.  TM Distance: Normal, at least 6 cm  Jaw/Neck Findings:  Neck ROM: Normal ROM      Dental:  Dental Findings: In tact   Chest/Lungs:  Chest/Lungs Findings: Clear to auscultation, Normal Respiratory Rate     Heart/Vascular:  Heart Findings: Rate: Normal        Mental Status:  Mental Status Findings:  Alert and Oriented, Cooperative         Anesthesia Plan  Type of Anesthesia, risks & benefits discussed:  Anesthesia Type:  general  Patient's Preference:   Intra-op Monitoring Plan: standard ASA monitors  Intra-op Monitoring Plan Comments:   Post Op Pain Control Plan: per primary service following discharge from PACU  Post Op Pain Control Plan Comments:   Induction:   IV  Beta Blocker:  Patient is on a Beta-Blocker and has received one dose within the past 24 hours (No further documentation required).       Informed Consent: Patient understands risks and agrees with Anesthesia plan.  Questions answered. Anesthesia consent signed with patient.  ASA Score: 2     Day of Surgery Review of History & Physical:    H&P update referred to the provider.         Ready For Surgery From Anesthesia Perspective.

## 2020-08-06 NOTE — DISCHARGE SUMMARY
[facility]  Cardiac Electrophysiology  Discharge Summary      Patient Name: Jhonatan Sofia  MRN: [id number]  Admission Date: 8/6/2020  Hospital Length of Stay: 0 days  Discharge Date and Time:  08/06/2020 10:14 AM  Attending Physician: Scotty Shell MD    Discharging Provider: Becky Quinones NP  Primary Care Physician: Jose Sanchez MD    HPI:   Jhonatan Sofia is a 55 y.o. male who presents to short stay cardiac unit on 08/05/2020 for planned RADHIKA guided DCCV with Dr. Shell.     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 He works at [employer] and gets yearly on-site physical exams with electrocardiograms. He reports they have been normal until this past March of 2020 when he was diagnosed with atrial fibrillation. He had no obvious symptoms, however his wife and daughter report some increased fatigue that they have noted. He was seen by Dr. Sanchez who referred him to general cardiology. Low dose metoprolol and eliquis were prescribed, but he had yet to start his eliquis at time of EP visit.     Mr. Sofia exercises regularly with heart rate monitoring and notes at times he sustains his heart rate in the 180s-200 bpm range. He does not use exercise supplements, does not drink alcohol, and wife reports no symptoms consistent with sleep apnea. He had an exercise stress echocardiogram in 2017 which noted no ischemia and showed a structurally normal left ventricle with mild left atrial enlargement (LVEF 55%). A recent echocardiogram noted a LVEF of 50% with a severely enlarged left atrium. A recent TSH lab test indicated a euthyroid state.     At initial EP visit with Dr. Shell atrial fibrillation pathophysiology and management were discussed.  Elected to proceed with DCCV for restoration of sinus rhythm.  Eliquis started for 4 weeks prior to cardioversion, and then will mutually decide on long-term use based  on risk-benefit analysis.         Today, Mr. Sofia reports feeling well. He denies any bleeding, overt shortness of breath, chest pains, rash , fevers, chills, or any other acute symptoms     Anticoagulation: Eliquis, last dose taken on 8/6/20   [id number] score = 1   Review of labs: Kidney function normal, Cr 1.4. Hgb 14.0.   COVID 19 Test: Not Detected on 8/3/20     EKG:   My independent visualization of most recent EKG is atrial fibrillation 49 bpm    Procedure(s) (LRB):  CARDIOVERSION (N/A)  Transesophageal echo (RADHIKA) intra-procedure log documentation (N/A)     Indwelling Lines/Drains at time of discharge:  Lines/Drains/Airways     None                 Hospital Course:  Mr. Sofia underwent RADHIKA without evidence of SURINDER thrombus.  Proceeded with DCCV 200 J x 1 shock, converting from AF to sinus rhythm. He tolerated the procedure without any acute complications.   Post-DCCV ECG revealing of sinus rhythm 64 bpm.  He recovered in PACU prior to being discharged home in stable condition.     Consults: Anesthesia       Pending Diagnostic Studies:     Procedure Component Value Units Date/Time    EKG 12-LEAD [[id number]]     Order Status: Sent Lab Status: No result           Final Active Diagnoses:    Diagnosis Date Noted POA    Atrial fibrillation [I48.91] 08/06/2020 Yes    Persistent atrial fibrillation [I48.19] 07/07/2020 Yes      Problems Resolved During this Admission:     Atrial fibrillation  s/p RADHIKA guide DCCV with restoration of sinus rhythm     Plan:  - Continue eliquis 5 mg BID for the next month, will discuss long-term OAC plan at  f/u   - Follow up with ECG in 1 week (8/13/20 at 7:45am)   - Follow up with Dr. Shell in 4 weeks   - Discharge plans/instructions discussed with patient who verbalized understanding and agreement of plans of care. No further questions or concerns  voiced at this time.       Discharged Condition: good    Disposition: Home or Self Care    Follow Up:  Follow-up Information      Scotty Shell MD In 4 weeks.    Specialties: Electrophysiology, Cardiology  Why: s/p RADHIKA/DCCV  Contact information:  [address]  [facility] [address]  [phone]                 Patient Instructions:      Other restrictions (specify):   Order Comments: Medications:   - Continue blood thinner: Eliquis for stroke prevention   - If you have problems or side effects caused by your medications, call the clinic.     Diet   -You may resume oral intake after you are discharged, as long you have no swallowing difficulties.       Side effects:   -You may be drowsy for the remainder of the day from the sedation.       Because you have received sedation for this procedure:   - Limit activity for the remainder of the day.   - Do not smoke for at least 6 hours and until you are fully awake and alert.   - Do not drink alcoholic beverage for 24 hours.   - Do not drive for 24 hours   - Defer important decision making until the following day.       Go to the Emergency Department if you develop:   - Bleeding   - Weakness or numbness   - Visual, gait or speech disturbance   - New chest pain, palpitations, shortness of breath, rapid heart beat, or fainting   - Fever     Any need to reschedule or cancel procedures, or any questions regarding your procedures should be addressed directly with the Arrhythmia Department Nurses at the following phone number: [phone].     Call MD for:  temperature >100.4     Call MD for:  persistent dizziness or light-headedness     Call MD for:  extreme fatigue     Call MD for:  difficulty breathing, headache or visual disturbances         Medications:  Reconciled Home Medications:      Medication List      CONTINUE taking these medications    apixaban 5 mg Tab  Commonly known as: ELIQUIS  Take 1 tablet (5 mg total) by mouth 2 (two) times daily.     aspirin 81 MG EC tablet  Commonly known as: ECOTRIN  Take 81 mg by mouth once daily.     metoprolol tartrate 25 MG tablet  Commonly known as:  LOPRESSOR  Take 1 tablet (25 mg total) by mouth 2 (two) times daily.            Time spent on the discharge of patient: 20 minutes    Becky Quinones NP  Cardiac Electrophysiology  [facility]

## 2020-08-06 NOTE — ASSESSMENT & PLAN NOTE
s/p RADHIKA guide DCCV with restoration of sinus rhythm     Plan:  - Continue eliquis 5 mg BID for the next month, will discuss long-term OAC plan at EP f/u   - Follow up with ECG in 1 week (8/13/20 at 7:45am)   - Follow up with Dr. Shell in 4 weeks   - Discharge plans/instructions discussed with patient who verbalized understanding and agreement of plans of care. No further questions or concerns  voiced at this time.

## 2020-08-06 NOTE — PLAN OF CARE
Vss. sb to sr noted on cm.  Pt aaox4 follows commands.  Pt's wife updated over phone by ep pacu rn. Verbalizes understanding. Silvadene to chest and back. Silvadene with pt. Awaiting sscu bed. Remains in pacu ep 3. 12 lead ekg done per md order. No tele when transfers to sscu per marge metzger np once released from anesthesia. See flowsheet for full assessment.

## 2020-08-06 NOTE — TRANSFER OF CARE
Anesthesia Transfer of Care Note    Patient: Jhonatan Sofia    Procedure(s) Performed: Procedure(s) (LRB):  CARDIOVERSION (N/A)  Transesophageal echo (RADHIKA) intra-procedure log documentation (N/A)    Patient location: PACU    Anesthesia Type: general    Transport from OR: Transported from OR on room air with adequate spontaneous ventilation    Post pain: adequate analgesia    Post assessment: no apparent anesthetic complications    Post vital signs: stable    Level of consciousness: awake    Nausea/Vomiting: no nausea/vomiting    Complications: none    Transfer of care protocol was followed      Last vitals:   Visit Vitals  /82 (BP Location: Right arm, Patient Position: Lying)   Pulse (!) 47   Temp 35.9 °C (96.6 °F) (Temporal)   Resp 18   Ht 6' (1.829 m)   Wt 99.8 kg (220 lb)   SpO2 97%   BMI 29.84 kg/m²

## 2020-08-06 NOTE — HPI
Jhonatan Sofia is a 55 y.o. male who presents to short stay cardiac unit on 08/05/2020 for planned RADHIKA guided DCCV with Dr. Shell.     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 He works at [employer] and gets yearly on-site physical exams with electrocardiograms. He reports they have been normal until this past March of 2020 when he was diagnosed with atrial fibrillation. He had no obvious symptoms, however his wife and daughter report some increased fatigue that they have noted. He was seen by Dr. Sanchez who referred him to general cardiology. Low dose metoprolol and eliquis were prescribed, but he had yet to start his eliquis at time of EP visit.     Mr. Sofia exercises regularly with heart rate monitoring and notes at times he sustains his heart rate in the 180s-200 bpm range. He does not use exercise supplements, does not drink alcohol, and wife reports no symptoms consistent with sleep apnea. He had an exercise stress echocardiogram in 2017 which noted no ischemia and showed a structurally normal left ventricle with mild left atrial enlargement (LVEF 55%). A recent echocardiogram noted a LVEF of 50% with a severely enlarged left atrium. A recent TSH lab test indicated a euthyroid state.     At initial EP visit with Dr. Shell atrial fibrillation pathophysiology and management were discussed.  Elected to proceed with DCCV for restoration of sinus rhythm.  Eliquis started for 4 weeks prior to cardioversion, and then will mutually decide on long-term use based on risk-benefit analysis.         Today, Mr. Sofia reports feeling well. He denies any bleeding, overt shortness of breath, chest pains, rash , fevers, chills, or any other acute symptoms     Anticoagulation: Eliquis, last dose taken on 8/6/20   [id number] score = 1   Review of labs: Kidney function normal, Cr 1.4. Hgb 14.0.   COVID 19 Test: Not Detected on 8/3/20      EKG:   My independent visualization of most recent EKG is atrial fibrillation 49 bpm

## 2020-08-06 NOTE — PLAN OF CARE
Patient discharged per MD orders. Instructions given on medications, wound care, activity, signs of infection, when to call MD, and follow up appointments. Pt verbalized understanding.  Patient AAOx3, VSS, no c/o pain or discomfort at this time.  PIV removed. Patient refused transport, ambulated with RN to front of building to family.

## 2020-08-06 NOTE — PLAN OF CARE
Pt arrived to unit accompanied by spouse.  Vss.  Oriented pt to rm and unit.  Pre op orders and assessment initiated.  Pt remains npo.  Pt in no acute distress and verbalizes no complaints.  Will continue to monitor.  Call bell within reach.

## 2020-08-06 NOTE — LETTER
July 27, 2020    Jhonatan Sofia  [address] [place] [address]       Patient Instructions  RADHIKA (Transesophageal Echocardiogram) with Cardioversion     Pre-Procedure Testing:     July 30, 2020 - Blood Work  [facility]  [address] [place] [address]    You do NOT need to fast for this blood work.       August 3, 2020 at 3:50 pm- COVID 19 Nasal Swab  [facility]  [address] [facility] [address] [facility] [address]    You do NOT need to fast for this.       Important Medication Information:    · Continue taking Eliquis 5 mg twice daily as prescribed.    · You may take your other usual morning medications with a sip of water on the day of your procedure.       **Prior to your procedure please check your skin and groin area thoroughly for any signs or symptoms of infection such as rashes, open sores, yeast infection, or heat rashes.  If you have any concerns with your skin you should be evaluated by your Primary Care Provider or an Urgent Care for treatment prior to your procedure. Your procedure will be cancelled if not treated prior to the day of your procedure.**      Day of Procedure:    August 6, 2020 at 7:00 am  [facility] [address] [place] [address]    Please report to Cardiology Waiting Room on the 3rd floor of the Hospital    · Do not eat or drink anything after 12 midnight on the night before your procedure.  · You are allowed one adult guest to accompany you pre and post procedure.    · You will be going home after your procedure.  · You will need someone to drive you home from the hospital due to anesthesia.       Post Procedure Testing:    August 13, 2020 at 8:00 am - EKG Only - 3rd Floor - We will call you with test results.    [facility] [address] [place] [address]  You will have a post procedural EKG one week after your cardioversion to assess your heart rhythm and rate.       Directions to Cardiology Waiting Room  If  you park in the Parking Garage:  Take elevators to the 2nd floor  Walk up ramp and turn right by Gold Elevators  Take elevator to the 3rd floor  Upon exiting the elevator, turn away from the clinic areas  Walk long sierra around to front of hospital to area with windows overlooking [facility]  Check in at Reception Desk  OR  If family is dropping you off:  Have them drop you off at the front of the Hospital  (Near the ER, where all the flags are hung).  Take the E elevators to the 3rd floor.  Check in at the Reception Desk in the waiting room.      Any need to reschedule or cancel procedures, or any questions regarding your procedures should be addressed directly with the Arrhythmia Department Nurses at the following phone number: [phone].

## 2020-08-06 NOTE — H&P
[facility] - Short Stay Cardiac Unit  Cardiology  History and Physical     Patient Name: Jhonatan Sofia  MRN: [id number]  Admission Date: 8/6/2020  Code Status: No Order   Attending Provider: Scotty Shell MD   Primary Care Physician: Jose Sanchez MD  Principal Problem:<principal problem not specified>    Patient information was obtained from patient and past medical records.     Subjective:     Chief Complaint:  afib      HPI:    Mr. Sofia, 55 year old male with prior history of HTN and recently diagnosed AFib (diagnosed on 3/2020). He was asymptomatic, and he was started on AC and metoprolol for rate control. The plan to proceed to restore his normal rhythm. No prior RADHIKA, neck surgery or radiation. EKG this morning shows afib with slow ventricular response. No history of GI bleed, loose teeth, neck radiation or surgery.     Card Meds:  apixaban 5 mg PO BID, Aspirin 81 mg PO daily and metoprolol tartrate 25 mg PO daily.     5/14/2020 TTE  · Low normal left ventricular systolic function. The estimated ejection fraction is 50%.  · No wall motion abnormalities.  · Mildly reduced right ventricular systolic function.  · Severe left atrial enlargement.  · The estimated PA systolic pressure is 20 mmHg.  · Normal central venous pressure (3 mmHg).  · The sinuses of Valsalva is mildly dilated.  · Atrial fibrillation observed.    RADHIKA ROS  Dysphagia or odynophagia:  No  Liver Disease, esophageal disease, or known varices:  No  Upper GI Bleeding: No  Snoring:  No  Sleep Apnea:  No  Prior neck surgery or radiation:  No  History of anesthetic difficulties:  No  Family history of anesthetic difficulties:  No  Last oral intake:  12 hours ago  Able to move neck in all directions:  No        Past Medical History:   Diagnosis Date    Anticoagulant long-term use     Hyperlipidemia     Kidney stones        Past Surgical History:   Procedure Laterality Date    SHOULDER SURGERY         Review of patient's allergies  indicates:  No Known Allergies    No current facility-administered medications on file prior to encounter.      Current Outpatient Medications on File Prior to Encounter   Medication Sig    apixaban (ELIQUIS) 5 mg Tab Take 1 tablet (5 mg total) by mouth 2 (two) times daily.    metoprolol tartrate (LOPRESSOR) 25 MG tablet Take 1 tablet (25 mg total) by mouth 2 (two) times daily.    aspirin (ECOTRIN) 81 MG EC tablet Take 81 mg by mouth once daily.     Family History     Problem Relation (Age of Onset)    Arthritis Sister, Sister    Heart disease Father    Kidney failure Father    Liver disease Sister        Tobacco Use    Smoking status: Never Smoker    Smokeless tobacco: Never Used   Substance and Sexual Activity    Alcohol use: Yes     Alcohol/week: 2.0 standard drinks     Types: 2 Cans of beer per week     Frequency: 2-4 times a month     Drinks per session: 1 or 2     Binge frequency: Less than monthly     Comment: minimal    Drug use: Not on file    Sexual activity: Not on file       Objective:     Vital Signs (Most Recent):  Temp: 96.6 °F (35.9 °C) (08/06/20 0719)  Pulse: (!) 47 (08/06/20 0719)  Resp: 18 (08/06/20 0719)  BP: 110/82 (08/06/20 0720)  SpO2: 97 % (08/06/20 0719) Vital Signs (24h Range):  Temp:  [96.6 °F (35.9 °C)] 96.6 °F (35.9 °C)  Pulse:  [47] 47  Resp:  [18] 18  SpO2:  [97 %] 97 %  BP: (110-118)/(82-89) 110/82     Weight: 99.8 kg (220 lb)  Body mass index is 29.84 kg/m².    SpO2: 97 %  O2 Device (Oxygen Therapy): room air    No intake or output data in the 24 hours ending 08/06/20 0817    Lines/Drains/Airways     Peripheral Intravenous Line                 Peripheral IV - Single Lumen 08/06/20 0736 20 G Right Antecubital less than 1 day            Physical Exam  Vital signs reviewed  Constitutional: Oriented to person, place, and time. Appears  well-developed and well-nourished.   HENT:   Head: Normocephalic and atraumatic.   Eyes: EOM are normal.   Neck: Normal range of motion. No JVD  present.   Cardiovascular: Normal rate, irregularly irregular rhythm, normal heart sounds and intact distal pulses.   Exam reveals no gallop and no friction rub.   No murmur heard.  Pulmonary/Chest: Effort normal and breath sounds normal. No wheeze or rales present. No chest wall tenderness  Abdominal: Soft. Bowel sounds are normal. There is no tenderness. There is no guarding.   Musculoskeletal: There is no edema present   Skin: Skin is warm and dry.           Significant Labs: CMP No results for input(s): NA, K, CL, CO2, GLU, BUN, CREATININE, CALCIUM, PROT, ALBUMIN, BILITOT, ALKPHOS, AST, ALT, ANIONGAP, ESTGFRAFRICA, EGFRNONAA in the last 48 hours., CBC No results for input(s): WBC, HGB, HCT, PLT in the last 48 hours. and INR No results for input(s): INR, PROTIME in the last 48 hours.    Significant Imaging: Echocardiogram:   Transthoracic echo (TTE) complete (Cupid Only):   Results for orders placed or performed during the hospital encounter of 05/14/20   Echo Color Flow Doppler? No; Bubble Contrast? No   Result Value Ref Range    BSA 2.29 m2    TDI SEPTAL 0.08 m/s    LV LATERAL E/E' RATIO 3.93 m/s    LV SEPTAL E/E' RATIO 7.38 m/s    LA WIDTH 4.54 cm    TDI LATERAL 0.15 m/s    LVIDD 4.83 3.5 - 6.0 cm    IVS 1.13 (A) 0.6 - 1.1 cm    PW 0.88 0.6 - 1.1 cm    LVIDS 3.52 2.1 - 4.0 cm    FS 27 28 - 44 %    LA volume 114.90 cm3    Sinus 3.95 cm    STJ 3.04 cm    Ascending aorta 3.25 cm    LV mass 173.10 g    LA size 4.36 cm    RVDD 3.50 cm    TAPSE 1.51 cm    Left Ventricle Relative Wall Thickness 0.36 cm    AV mean gradient 3 mmHg    AV valve area 2.28 cm2    AV Velocity Ratio 0.50     AV index (prosthetic) 0.47     E/A ratio 1.64     Mean e' 0.12 m/s    E wave decelartion time 129.18 msec    Pulm vein S/D ratio 0.70     LVOT diameter 2.49 cm    LVOT area 4.9 cm2    LVOT peak román 0.58 m/s    LVOT peak VTI 10.95 cm    Ao peak román 1.17 m/s    Ao VTI 23.39 cm    LVOT stroke volume 53.29 cm3    AV peak gradient 5 mmHg     E/E' ratio 5.13 m/s    MV Peak E Umer 0.59 m/s    TR Max Umer 2.06 m/s    MV Peak A Umer 0.36 m/s    PV Peak S Umer 0.30 m/s    PV Peak D Umer 0.43 m/s    LV Systolic Volume 51.67 mL    LV Systolic Volume Index 23.0 mL/m2    LV Diastolic Volume 109.06 mL    LV Diastolic Volume Index 48.51 mL/m2    LA Volume Index 51.1 mL/m2    LV Mass Index 77 g/m2    RA Major Axis 5.64 cm    Left Atrium Minor Axis 6.76 cm    Left Atrium Major Axis 6.90 cm    Triscuspid Valve Regurgitation Peak Gradient 17 mmHg    RA Width 3.50 cm    Right Atrial Pressure (from IVC) 3 mmHg    TV rest pulmonary artery pressure 20 mmHg    Narrative    · Left ventricular systolic function. The estimated ejection fraction is   50%.  · No wall motion abnormalities.  · Mildly reduced right ventricular systolic function.  · Severe left atrial enlargement.  · The estimated PA systolic pressure is 20 mmHg.  · Normal central venous pressure (3 mmHg).  · The sinuses of Valsalva is mildly dilated.  · Atrial fibrillation observed.        Assessment and Plan:     No notes have been filed under this hospital service.  Service: Cardiology      VTE Risk Mitigation (From admission, onward)    None          Daphney Rodgers MD  Cardiology   [facility] Short Stay Cardiac Unit

## 2020-08-07 LAB
ASCENDING AORTA: 3.1 CM
BSA FOR ECHO PROCEDURE: 2.3 M2
STJ: 3.9 CM

## 2020-08-12 ENCOUNTER — PATIENT OUTREACH (OUTPATIENT)
Dept: OTHER | Facility: OTHER | Age: 55
End: 2020-08-12

## 2020-08-13 ENCOUNTER — HOSPITAL ENCOUNTER (OUTPATIENT)
Dept: CARDIOLOGY | Facility: CLINIC | Age: 55
Discharge: HOME | End: 2020-08-13
Payer: COMMERCIAL

## 2020-08-13 DIAGNOSIS — I49.8 OTHER SPECIFIED CARDIAC ARRHYTHMIAS: ICD-10-CM

## 2020-08-13 PROCEDURE — 93010 ELECTROCARDIOGRAM REPORT: CPT | Mod: S$GLB,,, | Performed by: INTERNAL MEDICINE

## 2020-08-13 PROCEDURE — 93010 RHYTHM STRIP: ICD-10-PCS | Mod: S$GLB,,, | Performed by: INTERNAL MEDICINE

## 2020-08-13 PROCEDURE — 93005 ELECTROCARDIOGRAM TRACING: CPT | Mod: S$GLB,,, | Performed by: INTERNAL MEDICINE

## 2020-08-13 PROCEDURE — 93005 RHYTHM STRIP: ICD-10-PCS | Mod: S$GLB,,, | Performed by: INTERNAL MEDICINE

## 2020-08-14 ENCOUNTER — TELEPHONE (OUTPATIENT)
Dept: ELECTROPHYSIOLOGY | Facility: CLINIC | Age: 55
End: 2020-08-14

## 2020-08-14 DIAGNOSIS — I48.19 PERSISTENT ATRIAL FIBRILLATION: Primary | ICD-10-CM

## 2020-08-14 RX ORDER — FLECAINIDE ACETATE 100 MG/1
100 TABLET ORAL EVERY 12 HOURS
Qty: 60 TABLET | Refills: 11 | Status: SHIPPED | OUTPATIENT
Start: 2020-08-14 | End: 2021-08-04

## 2020-08-14 NOTE — TELEPHONE ENCOUNTER
Attempting x 2 to reach Mr. Sofia to schedule DCCV.  No answer.  Message left asking for him to return call to our office.  Will also attempt portal message.

## 2020-08-14 NOTE — TELEPHONE ENCOUNTER
Spoke with Mr. Sofia regarding his recurrent AF after his DCCV. He reports he felt great for ~5 days after DCCV and then went back into AF. Discussed starting an anti-arrhythmic drug and then repeating a DCCV. He stated he would like to do that. 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 Will start flecainide 100mg bid Sunday and have my nurse schedule an outpatient DCCV Wednesday or Thursday. He should continue eliquis and metoprolol. He has not missed any eliquis and thus will defer RADHIKA.

## 2020-08-17 ENCOUNTER — PATIENT MESSAGE (OUTPATIENT)
Dept: ELECTROPHYSIOLOGY | Facility: CLINIC | Age: 55
End: 2020-08-17

## 2020-08-17 DIAGNOSIS — I48.19 PERSISTENT ATRIAL FIBRILLATION: Primary | ICD-10-CM

## 2020-08-19 ENCOUNTER — TELEPHONE (OUTPATIENT)
Dept: ELECTROPHYSIOLOGY | Facility: CLINIC | Age: 55
End: 2020-08-19

## 2020-08-19 DIAGNOSIS — I48.19 PERSISTENT ATRIAL FIBRILLATION: Primary | ICD-10-CM

## 2020-08-19 NOTE — TELEPHONE ENCOUNTER
Spoke to Mr. Israel.    CONFIRMED procedure scheduled on 8/20/2020 with arrival time of 1130.    -Elective procedure COVID 19 Testing scheduled to be done upon admit (he was unable to go day it would have needed to be done due to his work schedule).   -Advised of current visitor policy which allows for each patient to have one adult visitor pre and post procedure.  -Pre-procedure labs reviewed and nothing of concern noted (labs from 7/30/2020).   -NPO after midnight night prior to procedure.  -Mr. Israel reports he missed dose of Eliquis all together yesterday morning.  Advised we will need to add RADHIKA prior to DCCV.  He verbalizes understanding of this.  Discussed with SREEKANTH Chance, with our echo team who is able to accommodate RADHIKA tomorrow at 1330.  Will have Mr. Israel arrive for 1130.  -Confirmed no recent fever/cough/sob, yeast infection, open sores or skin rash present.    Mr. Israel verbalizes understanding of above and will call our office with any questions or concerns.

## 2020-08-20 ENCOUNTER — HOSPITAL ENCOUNTER (OUTPATIENT)
Facility: HOSPITAL | Age: 55
Discharge: HOME | End: 2020-08-20
Attending: INTERNAL MEDICINE | Admitting: INTERNAL MEDICINE
Payer: COMMERCIAL

## 2020-08-20 DIAGNOSIS — I48.91 ATRIAL FIBRILLATION: ICD-10-CM

## 2020-08-20 DIAGNOSIS — I48.19 PERSISTENT ATRIAL FIBRILLATION: Primary | ICD-10-CM

## 2020-08-20 LAB — SARS-COV-2 RDRP RESP QL NAA+PROBE: NEGATIVE

## 2020-08-20 PROCEDURE — 93010 ELECTROCARDIOGRAM REPORT: CPT | Mod: ,,, | Performed by: INTERNAL MEDICINE

## 2020-08-20 PROCEDURE — 93010 EKG 12-LEAD: ICD-10-PCS | Mod: ,,, | Performed by: INTERNAL MEDICINE

## 2020-08-20 PROCEDURE — 93005 ELECTROCARDIOGRAM TRACING: CPT

## 2020-08-20 PROCEDURE — U0002 COVID-19 LAB TEST NON-CDC: HCPCS

## 2020-08-20 RX ORDER — SODIUM CHLORIDE 0.9 % (FLUSH) 0.9 %
10 SYRINGE (ML) INJECTION
Status: DISCONTINUED | OUTPATIENT
Start: 2020-08-20 | End: 2020-08-20 | Stop reason: HOSPADM

## 2020-08-20 RX ORDER — ONDANSETRON 2 MG/ML
4 INJECTION INTRAMUSCULAR; INTRAVENOUS DAILY PRN
Status: CANCELLED | OUTPATIENT
Start: 2020-08-20

## 2020-08-20 NOTE — H&P
[facility] - Short Stay Cardiac Unit  Cardiac Electrophysiology  History and Physical     Admission Date: 8/20/2020  Code Status: No Order   Attending Provider: Scotty Shell MD   Principal Problem:<principal problem not specified>    Subjective:     Chief Complaint:  pAF     HPI:  Jhonatan Sofia presents to short stay cardiac unit on 08/20/2020 for planned DCCV with Dr. Shell.     Mr. Sofia is a 55 y.o. male with history of hypertension and atrial fibrillation. 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  He works at [employer] and gets yearly on-site physical exams with electrocardiograms. He reports they have been normal until this past March of 2020 when he was diagnosed with atrial fibrillation. He had no obvious symptoms, however his wife and daughter report some increased fatigue that they have noted. He was seen by Dr. Sanchez who referred him to general cardiology. Low dose metoprolol and eliquis were prescribed.       Mr. Sofia exercises regularly with heart rate monitoring and notes at times he sustains his heart rate in the 180s-200 bpm range. He does not use exercise supplements, does not drink alcohol, and wife reports no symptoms consistent with sleep apnea. He had an exercise stress echocardiogram in 2017 which noted no ischemia and showed a structurally normal left ventricle with mild left atrial enlargement (LVEF 55%). A recent echocardiogram noted a LVEF of 50% with a severely enlarged left atrium. A recent TSH lab test indicated a euthyroid state.       Following evaluation by Dr. Shell, Mr. Sofia presented on 08/06/20 and underwent RADHIKA guided DCCV with restoration of sinus rhythm.  He reports that he felt well for approximately 5 days before having AF recurrence.  Dr. Shell started him on Flecainide 100 mg BID on 08/16/20 with plan for repeat DCCV several days later.     Today, Mr. Sofia reports feeling well. He denies any bleeding, overt  shortness of breath, chest pains, rash, fevers, chills, or any other acute symptoms.  He continues to take his eliquis twice daily and denies any missed doses.         EKG:   My independent visualization of most recent EKG is sinus bradycardia 45 bpm    Past Medical History:   Diagnosis Date    Anticoagulant long-term use     Hyperlipidemia     Kidney stones        Past Surgical History:   Procedure Laterality Date    SHOULDER SURGERY      TREATMENT OF CARDIAC ARRHYTHMIA N/A 8/6/2020    Procedure: CARDIOVERSION;  Surgeon: Scotty Shell MD;  Location: [facility] EP LAB;  Service: Cardiology;  Laterality: N/A;  AF, RADHIKA, DCCV, MAC, [place], 3 Prep       Review of patient's allergies indicates:  No Known Allergies    No current facility-administered medications on file prior to encounter.      Current Outpatient Medications on File Prior to Encounter   Medication Sig    apixaban (ELIQUIS) 5 mg Tab Take 1 tablet (5 mg total) by mouth 2 (two) times daily.    aspirin (ECOTRIN) 81 MG EC tablet Take 81 mg by mouth once daily.    flecainide (TAMBOCOR) 100 MG Tab Take 1 tablet (100 mg total) by mouth every 12 (twelve) hours.    metoprolol tartrate (LOPRESSOR) 25 MG tablet Take 1 tablet (25 mg total) by mouth 2 (two) times daily.     Family History     Problem Relation (Age of Onset)    Arthritis Sister, Sister    Heart disease Father    Kidney failure Father    Liver disease Sister        Tobacco Use    Smoking status: Never Smoker    Smokeless tobacco: Never Used   Substance and Sexual Activity    Alcohol use: Yes     Alcohol/week: 2.0 standard drinks     Types: 2 Cans of beer per week     Frequency: 2-4 times a month     Drinks per session: 1 or 2     Binge frequency: Less than monthly     Comment: minimal    Drug use: Not on file    Sexual activity: Not on file     Review of Systems   Constitution: Negative for decreased appetite, fever, malaise/fatigue and weight gain.   HENT: Negative for congestion, hearing loss and  nosebleeds.    Eyes: Negative for visual disturbance.   Cardiovascular: Negative for chest pain, dyspnea on exertion, irregular heartbeat, leg swelling, palpitations and syncope.   Respiratory: Negative for cough, shortness of breath and sleep disturbances due to breathing.    Endocrine: Negative for cold intolerance and heat intolerance.   Hematologic/Lymphatic: Negative for bleeding problem. Does not bruise/bleed easily.   Gastrointestinal: Negative for bloating, abdominal pain, change in bowel habit and heartburn.   Neurological: Negative for dizziness, loss of balance and numbness.   Psychiatric/Behavioral: Negative for altered mental status. The patient is not nervous/anxious.      Objective:     Vital Signs (Most Recent):    Vital Signs (24h Range):             There is no height or weight on file to calculate BMI.            Physical Exam   Constitutional: He is oriented to person, place, and time. Vital signs are normal. He appears well-developed and well-nourished.   Cardiovascular: Regular rhythm, S1 normal, S2 normal, normal heart sounds and intact distal pulses. Bradycardia present. PMI is not displaced. Exam reveals no gallop and no friction rub.   No murmur heard.  Pulses:       Radial pulses are 2+ on the right side and 2+ on the left side.   Pulmonary/Chest: Effort normal and breath sounds normal. No accessory muscle usage. He has no decreased breath sounds. He has no wheezes.   Abdominal: Soft. Normal appearance and bowel sounds are normal. He exhibits no distension, no fluid wave and no ascites. There is no hepatosplenomegaly. There is no abdominal tenderness.   Musculoskeletal: Normal range of motion.         General: No edema.   Neurological: He is alert and oriented to person, place, and time. He has normal strength.   Skin: Skin is warm, dry and intact. No ecchymosis and no rash noted. No erythema.   Psychiatric: He has a normal mood and affect. His speech is normal and behavior is normal.  Judgment and thought content normal. Cognition and memory are normal.   Vitals reviewed.      Assessment and Plan:     Persistent atrial fibrillation  ECG revealing of sinus bradycardia.     - Cancel DCCV  - Continue PTA medications        Becky Quinones NP  Cardiac Electrophysiology  [facility] Short Stay Cardiac Unit

## 2020-08-20 NOTE — HPI
Jhonatan Sofia presents to short stay cardiac unit on 08/20/2020 for planned DCCV with Dr. Shell.     Mr. Sofia is a 55 y.o. male with history of hypertension and atrial fibrillation. 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  He works at [employer] and gets yearly on-site physical exams with electrocardiograms. He reports they have been normal until this past March of 2020 when he was diagnosed with atrial fibrillation. He had no obvious symptoms, however his wife and daughter report some increased fatigue that they have noted. He was seen by Dr. Sanchez who referred him to general cardiology. Low dose metoprolol and eliquis were prescribed.       Mr. Sofia exercises regularly with heart rate monitoring and notes at times he sustains his heart rate in the 180s-200 bpm range. He does not use exercise supplements, does not drink alcohol, and wife reports no symptoms consistent with sleep apnea. He had an exercise stress echocardiogram in 2017 which noted no ischemia and showed a structurally normal left ventricle with mild left atrial enlargement (LVEF 55%). A recent echocardiogram noted a LVEF of 50% with a severely enlarged left atrium. A recent TSH lab test indicated a euthyroid state.       Following evaluation by Dr. Shell, Mr. Sofia presented on 08/06/20 and underwent RADHIKA guided DCCV with restoration of sinus rhythm.  He reports that he felt well for approximately 5 days before having AF recurrence.  Dr. Shell started him on Flecainide 100 mg BID on 08/16/20 with plan for repeat DCCV several days later.     Today, Mr. Sofia reports feeling well. He denies any bleeding, overt shortness of breath, chest pains, rash, fevers, chills, or any other acute symptoms.  He continues to take his eliquis twice daily and denies any missed doses.         EKG:   My independent visualization of most recent EKG is sinus bradycardia 45 bpm

## 2020-08-20 NOTE — HOSPITAL COURSE
ECG revealing of sinus bradycardia. DCCV cancelled and MR. Sofia discharged home in stable condition.

## 2020-08-20 NOTE — DISCHARGE SUMMARY
[facility] Short Stay Cardiac Unit  Cardiac Electrophysiology  Discharge Summary      Patient Name: Jhonatan Sofia  MRN: [id number]  Admission Date: 8/20/2020  Hospital Length of Stay: 0 days  Discharge Date and Time:  08/20/2020 1:09 PM  Attending Physician: Scotty Shell MD    Discharging Provider: Becky Quinones NP  Primary Care Physician: Joes Sanchez MD    HPI:   Jhonatan Sofia presents to short stay cardiac unit on 08/20/2020 for planned DCCV with Dr. Shell.     Mr. Sofia is a 55 y.o. male with history of hypertension and atrial fibrillation. 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  He works at [employer] and gets yearly on-site physical exams with electrocardiograms. He reports they have been normal until this past March of 2020 when he was diagnosed with atrial fibrillation. He had no obvious symptoms, however his wife and daughter report some increased fatigue that they have noted. He was seen by Dr. Sanchez who referred him to general cardiology. Low dose metoprolol and eliquis were prescribed.       Mr. Sofia exercises regularly with heart rate monitoring and notes at times he sustains his heart rate in the 180s-200 bpm range. He does not use exercise supplements, does not drink alcohol, and wife reports no symptoms consistent with sleep apnea. He had an exercise stress echocardiogram in 2017 which noted no ischemia and showed a structurally normal left ventricle with mild left atrial enlargement (LVEF 55%). A recent echocardiogram noted a LVEF of 50% with a severely enlarged left atrium. A recent TSH lab test indicated a euthyroid state.       Following evaluation by Dr. Shell, Mr. Sofia presented on 08/06/20 and underwent RADHIKA guided DCCV with restoration of sinus rhythm.  He reports that he felt well for approximately 5 days before having AF recurrence.  Dr. Shell started him on Flecainide 100 mg BID on 08/16/20 with plan for repeat DCCV  several days later.     Today, Mr. Sofia reports feeling well. He denies any bleeding, overt shortness of breath, chest pains, rash, fevers, chills, or any other acute symptoms.  He continues to take his eliquis twice daily and denies any missed doses.         EKG:   My independent visualization of most recent EKG is sinus bradycardia 45 bpm    Procedure(s) (LRB):  Cardioversion or Defibrillation (N/A)  ECHOCARDIOGRAM, TRANSESOPHAGEAL (N/A)     Indwelling Lines/Drains at time of discharge:  Lines/Drains/Airways     None                 Hospital Course:  ECG revealing of sinus bradycardia. DCCV cancelled and Mr. Sofia was discharged home in stable condition.     Consults: N/A    Pending Diagnostic Studies:     Procedure Component Value Units Date/Time    COVID-19 Rapid Screening [[id number]] Collected: 08/20/20 1247    Order Status: Sent Lab Status: In process Updated: 08/20/20 1251    Specimen: Nasal Swab     Transesophageal echo (RADHIKA) [[id number]]     Order Status: Sent Lab Status: No result           Final Active Diagnoses:    Diagnosis Date Noted POA    Persistent atrial fibrillation [I48.19] 07/07/2020 Yes      Problems Resolved During this Admission:     Persistent atrial fibrillation  ECG revealing of sinus bradycardia. DCCV cancelled.    Plan:  - Continue current medication regimen: flecainide 100 mg BID, metoprolol tartrate 25 mg BID, eliquis 5 mg BID  - Tentative EKG in 1 week. Discussed with patient that he may cancel this if he is maintaining sinus rhythm.   - Routine follow up with Dr. Shell in 4 weeks       Discharged Condition: good    Disposition: Home or Self Care    Follow Up:  Follow-up Information     Scotty Shell MD In 4 weeks.    Specialties: Electrophysiology, Cardiology  Why: s/p DCCV, initiation on flecainide   Contact information:  [address] [facility] [id number] [phone]                 Patient Instructions:      Notify your health care provider if you experience any  of the following:  temperature >100.4     Notify your health care provider if you experience any of the following:  difficulty breathing or increased cough     Notify your health care provider if you experience any of the following:  persistent dizziness, light-headedness, or visual disturbances     Activity as tolerated     Medications:  Reconciled Home Medications:      Medication List      CONTINUE taking these medications    apixaban 5 mg Tab  Commonly known as: ELIQUIS  Take 1 tablet (5 mg total) by mouth 2 (two) times daily.     aspirin 81 MG EC tablet  Commonly known as: ECOTRIN  Take 81 mg by mouth once daily.     flecainide 100 MG Tab  Commonly known as: TAMBOCOR  Take 1 tablet (100 mg total) by mouth every 12 (twelve) hours.     metoprolol tartrate 25 MG tablet  Commonly known as: LOPRESSOR  Take 1 tablet (25 mg total) by mouth 2 (two) times daily.            Time spent on the discharge of patient: 10 minutes    Becky Quinones NP  Cardiac Electrophysiology  [facility]

## 2020-08-20 NOTE — ASSESSMENT & PLAN NOTE
ECG revealing of sinus bradycardia. DCCV cancelled.    Plan:  - Continue current medication regimen: flecainide 100 mg BID, metoprolol tartrate 25 mg BID, eliquis 5 mg BID  - Tentative EKG in 1 week. Discussed with patient that he may cancel this if he is maintaining sinus rhythm.   - Routine follow up with Dr. Shell in 4 weeks

## 2020-08-20 NOTE — SUBJECTIVE & OBJECTIVE
Past Medical History:   Diagnosis Date    Anticoagulant long-term use     Hyperlipidemia     Kidney stones        Past Surgical History:   Procedure Laterality Date    SHOULDER SURGERY      TREATMENT OF CARDIAC ARRHYTHMIA N/A 8/6/2020    Procedure: CARDIOVERSION;  Surgeon: Scotty Shell MD;  Location: [facility] EP LAB;  Service: Cardiology;  Laterality: N/A;  AF, RADHIKA, DCCV, MAC, [place], 3 Prep       Review of patient's allergies indicates:  No Known Allergies    No current facility-administered medications on file prior to encounter.      Current Outpatient Medications on File Prior to Encounter   Medication Sig    apixaban (ELIQUIS) 5 mg Tab Take 1 tablet (5 mg total) by mouth 2 (two) times daily.    aspirin (ECOTRIN) 81 MG EC tablet Take 81 mg by mouth once daily.    flecainide (TAMBOCOR) 100 MG Tab Take 1 tablet (100 mg total) by mouth every 12 (twelve) hours.    metoprolol tartrate (LOPRESSOR) 25 MG tablet Take 1 tablet (25 mg total) by mouth 2 (two) times daily.     Family History     Problem Relation (Age of Onset)    Arthritis Sister, Sister    Heart disease Father    Kidney failure Father    Liver disease Sister        Tobacco Use    Smoking status: Never Smoker    Smokeless tobacco: Never Used   Substance and Sexual Activity    Alcohol use: Yes     Alcohol/week: 2.0 standard drinks     Types: 2 Cans of beer per week     Frequency: 2-4 times a month     Drinks per session: 1 or 2     Binge frequency: Less than monthly     Comment: minimal    Drug use: Not on file    Sexual activity: Not on file     Review of Systems   Constitution: Negative for decreased appetite, fever, malaise/fatigue and weight gain.   HENT: Negative for congestion, hearing loss and nosebleeds.    Eyes: Negative for visual disturbance.   Cardiovascular: Negative for chest pain, dyspnea on exertion, irregular heartbeat, leg swelling, palpitations and syncope.   Respiratory: Negative for cough, shortness of breath and sleep  disturbances due to breathing.    Endocrine: Negative for cold intolerance and heat intolerance.   Hematologic/Lymphatic: Negative for bleeding problem. Does not bruise/bleed easily.   Gastrointestinal: Negative for bloating, abdominal pain, change in bowel habit and heartburn.   Neurological: Negative for dizziness, loss of balance and numbness.   Psychiatric/Behavioral: Negative for altered mental status. The patient is not nervous/anxious.      Objective:     Vital Signs (Most Recent):    Vital Signs (24h Range):             There is no height or weight on file to calculate BMI.            Physical Exam   Constitutional: He is oriented to person, place, and time. Vital signs are normal. He appears well-developed and well-nourished.   Cardiovascular: Regular rhythm, S1 normal, S2 normal, normal heart sounds and intact distal pulses. Bradycardia present. PMI is not displaced. Exam reveals no gallop and no friction rub.   No murmur heard.  Pulses:       Radial pulses are 2+ on the right side and 2+ on the left side.   Pulmonary/Chest: Effort normal and breath sounds normal. No accessory muscle usage. He has no decreased breath sounds. He has no wheezes.   Abdominal: Soft. Normal appearance and bowel sounds are normal. He exhibits no distension, no fluid wave and no ascites. There is no hepatosplenomegaly. There is no abdominal tenderness.   Musculoskeletal: Normal range of motion.         General: No edema.   Neurological: He is alert and oriented to person, place, and time. He has normal strength.   Skin: Skin is warm, dry and intact. No ecchymosis and no rash noted. No erythema.   Psychiatric: He has a normal mood and affect. His speech is normal and behavior is normal. Judgment and thought content normal. Cognition and memory are normal.   Vitals reviewed.

## 2020-08-26 ENCOUNTER — TELEPHONE (OUTPATIENT)
Dept: ELECTROPHYSIOLOGY | Facility: CLINIC | Age: 55
End: 2020-08-26

## 2020-08-26 NOTE — TELEPHONE ENCOUNTER
L/m for pt with f/u appt times s/p DCCV (and canceled DCCV). R/s 4w following canceled DCCV on 8/20/2020 per Mary Jane.  Will mail appt reminders.

## 2020-09-25 ENCOUNTER — PATIENT OUTREACH (OUTPATIENT)
Dept: OTHER | Facility: OTHER | Age: 55
End: 2020-09-25

## 2020-09-28 ENCOUNTER — TELEPHONE (OUTPATIENT)
Dept: ELECTROPHYSIOLOGY | Facility: CLINIC | Age: 55
End: 2020-09-28

## 2020-09-28 NOTE — TELEPHONE ENCOUNTER
m for  to call  office and reschedule a Canceled appointment from 9/21.          ----- Message from Jovita Sterling MA sent at 9/18/2020  1:40 PM CDT -----    ----- Message -----  From: Katlin Palmer MA  Sent: 9/18/2020  12:20 PM CDT  To: [facility] Staff    Please call the patient at [phone] the patient need to reschedule his appointment for a later time at four. Thank you.

## 2020-10-06 ENCOUNTER — OFFICE VISIT (OUTPATIENT)
Dept: ELECTROPHYSIOLOGY | Facility: CLINIC | Age: 55
End: 2020-10-06
Payer: COMMERCIAL

## 2020-10-06 ENCOUNTER — HOSPITAL ENCOUNTER (OUTPATIENT)
Dept: CARDIOLOGY | Facility: CLINIC | Age: 55
Discharge: HOME | End: 2020-10-06
Payer: COMMERCIAL

## 2020-10-06 VITALS
WEIGHT: 231.25 LBS | HEIGHT: 72 IN | SYSTOLIC BLOOD PRESSURE: 142 MMHG | BODY MASS INDEX: 31.32 KG/M2 | HEART RATE: 43 BPM | DIASTOLIC BLOOD PRESSURE: 86 MMHG

## 2020-10-06 DIAGNOSIS — I49.8 OTHER SPECIFIED CARDIAC ARRHYTHMIAS: ICD-10-CM

## 2020-10-06 DIAGNOSIS — Z92.89 HISTORY OF CARDIOVERSION: ICD-10-CM

## 2020-10-06 DIAGNOSIS — I48.19 PERSISTENT ATRIAL FIBRILLATION: Primary | ICD-10-CM

## 2020-10-06 DIAGNOSIS — I10 ESSENTIAL HYPERTENSION: ICD-10-CM

## 2020-10-06 PROCEDURE — 3008F BODY MASS INDEX DOCD: CPT | Mod: CPTII,S$GLB,, | Performed by: NURSE PRACTITIONER

## 2020-10-06 PROCEDURE — 3079F PR MOST RECENT DIASTOLIC BLOOD PRESSURE 80-89 MM HG: ICD-10-PCS | Mod: CPTII,S$GLB,, | Performed by: NURSE PRACTITIONER

## 2020-10-06 PROCEDURE — 93005 RHYTHM STRIP: ICD-10-PCS | Mod: S$GLB,,, | Performed by: INTERNAL MEDICINE

## 2020-10-06 PROCEDURE — 3077F PR MOST RECENT SYSTOLIC BLOOD PRESSURE >= 140 MM HG: ICD-10-PCS | Mod: CPTII,S$GLB,, | Performed by: NURSE PRACTITIONER

## 2020-10-06 PROCEDURE — 93005 ELECTROCARDIOGRAM TRACING: CPT | Mod: S$GLB,,, | Performed by: INTERNAL MEDICINE

## 2020-10-06 PROCEDURE — 99214 OFFICE O/P EST MOD 30 MIN: CPT | Mod: S$GLB,,, | Performed by: NURSE PRACTITIONER

## 2020-10-06 PROCEDURE — 93010 RHYTHM STRIP: ICD-10-PCS | Mod: S$GLB,,, | Performed by: INTERNAL MEDICINE

## 2020-10-06 PROCEDURE — 3079F DIAST BP 80-89 MM HG: CPT | Mod: CPTII,S$GLB,, | Performed by: NURSE PRACTITIONER

## 2020-10-06 PROCEDURE — 3077F SYST BP >= 140 MM HG: CPT | Mod: CPTII,S$GLB,, | Performed by: NURSE PRACTITIONER

## 2020-10-06 PROCEDURE — 93010 ELECTROCARDIOGRAM REPORT: CPT | Mod: S$GLB,,, | Performed by: INTERNAL MEDICINE

## 2020-10-06 PROCEDURE — 3008F PR BODY MASS INDEX (BMI) DOCUMENTED: ICD-10-PCS | Mod: CPTII,S$GLB,, | Performed by: NURSE PRACTITIONER

## 2020-10-06 PROCEDURE — 99999 PR PBB SHADOW E&M-EST. PATIENT-LVL III: ICD-10-PCS | Mod: PBBFAC,,, | Performed by: NURSE PRACTITIONER

## 2020-10-06 PROCEDURE — 99999 PR PBB SHADOW E&M-EST. PATIENT-LVL III: CPT | Mod: PBBFAC,,, | Performed by: NURSE PRACTITIONER

## 2020-10-06 PROCEDURE — 99214 PR OFFICE/OUTPT VISIT, EST, LEVL IV, 30-39 MIN: ICD-10-PCS | Mod: S$GLB,,, | Performed by: NURSE PRACTITIONER

## 2020-10-06 RX ORDER — METOPROLOL SUCCINATE 25 MG/1
12.5 TABLET, EXTENDED RELEASE ORAL DAILY
Qty: 15 TABLET | Refills: 11 | Status: SHIPPED | OUTPATIENT
Start: 2020-10-06 | End: 2021-09-30

## 2020-10-06 NOTE — PROGRESS NOTES
Mr. Sofia is a patient of Dr. Shell and was last seen in clinic 7/7/2020.      Subjective:   Patient ID:  Jhonatan Sofia is a 55 y.o. male who presents for follow-up of Atrial Fibrillation  .     HPI:    Mr. Sofia is a 55 y.o. male with HTN, AF here for follow up after cardioversion.     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 He works at [employer] and gets yearly on-site physical exams with electrocardiograms. He reports they have been normal until this past March of 2020 when he was diagnosed with atrial fibrillation. He had no obvious symptoms however his wife and daughter report some increased fatigue that they have noted. He was seen by Dr. Sanchez who referred him to general cardiology. Low dose metoprolol and eliquis were prescribed and he was referred for further evaluation. He has yet to start eliquis.    He exercises regularly with heart rate monitoring and notes at times he sustains his heart rate in the 180s-200 bpm range. He does not use exercise supplements, does not drink alcohol, and wife reports no symptoms consistent with sleep apnea. He had an exercise stress echocardiogram in 2017 which noted no ischemia and showed a structurally normal left ventricle with mild left atrial enlargement (LVEF 55%). A recent echocardiogram noted a LVEF of 50% with a severely enlarged left atrium. A recent TSH lab test indicated a euthyroid state.    I reviewed available electrocardiograms in Epic which show normal sinus rhythm until an ECG dated 5/14/2020 which showed atrial fibrillation with controlled ventricular response.    His [id number] score is 1 and we discussed that anticoagulation long-term is a risk-benefit shared decision analysis. I also discussed the goal to reduce symptomatic arrhythmic episodes by pharmacologic and/or procedural methods and utilizing a rhythm versus a rate control strategy. He  may have latent symptoms per his family (fatigue). Discussed regardless at his age and activity level as well as this being the first documented event I strongly recommend an attempt at restoration of sinus rhythm to assess symptomatic response. He understood and agreed. Discussed need to take eliquis prior and 4 weeks after cardioversion. He can then decide on whether he wants to continue it long-term. If needed would use flecainide for rhythm control. Discussed risk atrial fibrillation in high-endurance athletes and advised to limit his exercise effort to maintain acceptable age-related exercise heart rate.     8/6/2020: Cardioversion was successfully performed with restoration of normal sinus rhythm.    Spoke with Mr. Sofia regarding his recurrent AF after his DCCV. He reports he felt great for ~5 days after DCCV and then went back into AF. Discussed starting an anti-arrhythmic drug and then repeating a DCCV. He stated he would like to do that. 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 Will start flecainide 100mg bid Sunday and have my nurse schedule an outpatient DCCV Wednesday or Thursday. He should continue eliquis and metoprolol. He has not missed any eliquis and thus will defer RADHIKA.    Update (10/06/2020):    Follow up DCCV aborted due to pt being in SR.    Today he thinks he had 2 breakthrough episodes, both when he took his meds later, one lasting under an hour, one lasting several hours. Otherwise feels well. Continues to exercise. Denies JACOME, CP, LH, syncope.    He is currently taking eliquis 5mg BID for stroke prophylaxis and denies significant bleeding episodes. He is currently being treated with flecainide 100mg BID for rhythm control and metoprolol tartrate 25mg BID for HR control.  Kidney function is stable, with a creatinine of 1.4 on 7/30/2020.    I have personally reviewed the patient's EKG today, which shows sinus bradycardia with 1st degree AVB at 43bpm. SC  interval is 256. QRS is 92. QT is 444.    Recent Cardiac Tests:    RADHIKA (8/6/2020):  · RADHIKA to evaluate SURINDER prior DCCV for atrial fibrillation  · No thrombus is present in the appendage.  · Low normal left ventricular systolic function. The estimated ejection fraction is 50%.  · Mild right ventricular enlargement.  · Low normal right ventricular systolic function.  · Mild mitral regurgitation.    Current Outpatient Medications   Medication Sig    apixaban (ELIQUIS) 5 mg Tab Take 1 tablet (5 mg total) by mouth 2 (two) times daily.    flecainide (TAMBOCOR) 100 MG Tab Take 1 tablet (100 mg total) by mouth every 12 (twelve) hours.    metoprolol tartrate (LOPRESSOR) 25 MG tablet Take 1 tablet (25 mg total) by mouth 2 (two) times daily.    aspirin (ECOTRIN) 81 MG EC tablet Take 81 mg by mouth once daily.     No current facility-administered medications for this visit.      Facility-Administered Medications Ordered in Other Visits   Medication    sodium chloride 0.9% bolus 1,000 mL     Review of Systems   Constitution: Negative for malaise/fatigue.   Cardiovascular: Positive for palpitations. Negative for chest pain, dyspnea on exertion, irregular heartbeat and leg swelling.   Respiratory: Negative for shortness of breath.    Hematologic/Lymphatic: Negative for bleeding problem.   Skin: Negative for rash.   Musculoskeletal: Negative for myalgias.   Gastrointestinal: Negative for hematemesis, hematochezia and nausea.   Genitourinary: Negative for hematuria.   Neurological: Negative for light-headedness.   Psychiatric/Behavioral: Negative for altered mental status.   Allergic/Immunologic: Negative for persistent infections.     Objective:        BP (!) 142/86   Pulse (!) 43   Ht 6' (1.829 m)   Wt 104.9 kg (231 lb 4.2 oz)   BMI 31.36 kg/m²     Physical Exam   Constitutional: He is oriented to person, place, and time. He appears well-developed and well-nourished.   HENT:   Head: Normocephalic.   Nose: Nose normal.    Eyes: Pupils are equal, round, and reactive to light.   Cardiovascular: Regular rhythm, S1 normal and S2 normal. Bradycardia present.   No murmur heard.  Pulses:       Radial pulses are 2+ on the right side and 2+ on the left side.   Pulmonary/Chest: Breath sounds normal. No respiratory distress.   Abdominal: Normal appearance.   Musculoskeletal: Normal range of motion.         General: No edema.   Neurological: He is alert and oriented to person, place, and time.   Skin: Skin is warm and dry. No erythema.   Psychiatric: He has a normal mood and affect. His speech is normal and behavior is normal.   Nursing note and vitals reviewed.    Lab Results   Component Value Date     07/30/2020    K 4.1 07/30/2020    BUN 24 (H) 07/30/2020    CREATININE 1.4 07/30/2020    ALT 35 05/14/2020    AST 25 05/14/2020    HGB 14.0 07/30/2020    HCT 42.6 07/30/2020    TSH 2.203 05/14/2020    LDLCALC 141.4 05/14/2020       Recent Labs   Lab 07/30/20  1630   INR 1.0      Assessment:     1. Persistent atrial fibrillation    2. Essential hypertension    3. History of cardioversion      Plan:     In summary, Mr. Sofia is a 55 y.o. male with HTN, AF here for follow up after cardioversion.   He underwent cardioversion 8/6 with symptom improvement. Continued to have AF afterward and was placed on flecainide. In SR when presented for subsequent DCCV which was aborted. He is here for follow up.   He continues to feel better in SR. Has had 2 breakthrough episodes when he delayed his medications. One lasting several hours. Given his young age and symptomatic breakthrough AF despite flecainide, he would likely be a good candidate for PVI. (UPDATE: Dr. Shell confirms good PVI candidate)  Risks, benefits, and alternatives discussed with patient. He is interested in proceeding but cannot fit it into his schedule until after the new year. Will have patient RTC in 3 mo to confirm. In interim, continue flecainide. He has chronic bradycardia and  is asymptomatic, but skips his metoprolol some days if his BP is low. Will reduce to 12.5mg toprol. He remains on eliquis for CVA prophylaxis (CHADSVASc 1).    Change metoprolol to metoprolol succinate 12.5mg daily.  Continue other medications.  RTC 3 mo to discuss PVI, sooner if needed.    *A copy of this note has been sent to Dr. Shell*    Follow up in about 3 months (around 1/6/2021).    ------------------------------------------------------------------    GARO Moya, NP-C  Cardiac Electrophysiology

## 2020-10-06 NOTE — PATIENT INSTRUCTIONS
Having Catheter Ablation  A heart rhythm problem (arrhythmia) can make your heart beat too fast or in an irregular pattern. The problem is often caused by cells in your heart that arent working as they should. Or, it may be the result of an abnormal electric circuit. It may cause bothersome symptoms, such as an irregular heartbeat, dizziness, shortness of breath, chest pain, or fainting. Your doctor has recommended catheter ablation to treat your arrhythmia. This non-surgical procedure destroys the cells that are causing the problem.  Before the procedure    Before your catheter ablation, you will meet with a specially trained heart doctor (cardiac electrophysiologist) who will do the procedure. He or she will tell you how to get ready. You will likely be told to stop or change your heart rhythm medicines for a period of time before the procedure. Follow your doctors instructions. Also:  · Tell the doctor about all prescription and over-the-counter medicines you take. This includes herbs, supplements, and vitamins. It also includes daily medicines, such as insulin or blood thinners. If you are allergic to any medicines, tell the doctor.  · Have any routine tests, such as blood tests, as recommended.  · Dont eat or drink anything 12 hours before the procedure.  How catheter ablation is done  Catheter ablation uses thin, flexible wires (electrode catheters) to find and destroy (ablate) problem cells. Heres how the procedure is done:  · The hearts signals are mapped. To find the problem, an electrophysiology study (EPS) is done. During this study, the doctor tries to start (induce) your arrhythmia. An electrical map of the heart is then created. This shows the type of arrhythmia you have and where the problem is. Using the map as a guide, the doctor knows where to ablate.  · Problem areas are destroyed using heat or cold therapy. Once the EPS shows where the problem is, the doctor threads an electrode  catheter through a blood vessel to that area in the heart. Energy is sent through the catheter to destroy the problem cells.  · The hearts rhythm is tested again. After ablating the problem cells, the doctor tries to restart (reinduce) your arrhythmia. If a fast rhythm cant be induced, the ablation is a success. But if a fast rhythm does start again, you may need more ablation.  Your experience during catheter ablation  In most cases, catheter ablation is done in an electrophysiology (EP) lab. It often takes 2 to 4 hours, and sometimes longer. Youll receive medicine to prevent pain. Medicine will also help you relax or sleep during the procedure. If you feel uncomfortable during the procedure, tell the doctor or nurse:  · Getting started. The healthcare team washes the skin on your groin (or rarely, the neck). Any hair in that area may be removed. This is where the catheters will be inserted. An IV (intravenous) line is started in your arm. Medicines and fluids are given through this IV. To help keep the insertion site germ-free (sterile), your body is draped with sheets. Only the area where the catheters will be inserted is exposed.  · Inserting the catheters. The healthcare provider numbs the skin where the catheters will be inserted with pain medicine (local anesthetic). Then the provider uses a small needle to make punctures in your vein or artery. He or she puts catheters through these punctures and guides them to your heart. The provider uses X-ray monitors to help guide the catheters.  · The provider then puts wires in several places in the heart to map the electrical signals. The wires also stimulate the heart.  · Finishing up. When the procedure is finished, the provider takes the catheters out of your body. He or she puts pressure on the puncture sites to stop any bleeding. No stitches are needed. Youre then taken to a recovery room to rest. You'll need to remain lying down for 2 to 6 hours. You'll  also be asked not to move the leg where the catheters were inserted for a few hours. This is to make sure the insertion sites don't bleed.  Risks and complications  The risks of catheter ablation are fairly low compared to the benefits you receive. Discuss these risks with your doctor before the procedure. Possible risks and complications include:  · Bleeding or bruising at the catheter insertion site  · Blood clots  · A slow heart rhythm (requiring a permanent pacemaker)  · Perforation of the heart muscle, blood vessel, or lung (may require an emergency procedure)  · Damage to a heart valve (rare)  · Stroke or heart attack, also known as acute myocardial infarction, or AMI (rare)  · Infection, which is a risk after any invasive procedure. This may be indicated by a fever of 100.4°F (38.0°C) or higher, drainage, or redness and pain at the catheter insertion site.  · Death (extremely rare)   Date Last Reviewed: 5/1/2016  © 7417-6158 [employer]. [address] [place] [address], [place] [address]. All rights reserved. This information is not intended as a substitute for professional medical care. Always follow your healthcare professional's instructions.

## 2021-04-14 ENCOUNTER — OFFICE VISIT (OUTPATIENT)
Dept: ELECTROPHYSIOLOGY | Facility: CLINIC | Age: 56
End: 2021-04-14
Payer: COMMERCIAL

## 2021-04-14 VITALS
HEART RATE: 53 BPM | BODY MASS INDEX: 31.56 KG/M2 | HEIGHT: 72 IN | WEIGHT: 233 LBS | DIASTOLIC BLOOD PRESSURE: 84 MMHG | SYSTOLIC BLOOD PRESSURE: 132 MMHG

## 2021-04-14 DIAGNOSIS — I10 ESSENTIAL HYPERTENSION: ICD-10-CM

## 2021-04-14 DIAGNOSIS — Z92.89 HISTORY OF CARDIOVERSION: ICD-10-CM

## 2021-04-14 DIAGNOSIS — I49.8 OTHER SPECIFIED CARDIAC ARRHYTHMIAS: ICD-10-CM

## 2021-04-14 DIAGNOSIS — I48.19 PERSISTENT ATRIAL FIBRILLATION: Primary | ICD-10-CM

## 2021-04-14 PROCEDURE — 93010 ELECTROCARDIOGRAM REPORT: CPT | Mod: S$GLB,,, | Performed by: INTERNAL MEDICINE

## 2021-04-14 PROCEDURE — 93005 RHYTHM STRIP: ICD-10-PCS | Mod: S$GLB,,, | Performed by: INTERNAL MEDICINE

## 2021-04-14 PROCEDURE — 99214 PR OFFICE/OUTPT VISIT, EST, LEVL IV, 30-39 MIN: ICD-10-PCS | Mod: S$GLB,,, | Performed by: NURSE PRACTITIONER

## 2021-04-14 PROCEDURE — 1126F PR PAIN SEVERITY QUANTIFIED, NO PAIN PRESENT: ICD-10-PCS | Mod: S$GLB,,, | Performed by: NURSE PRACTITIONER

## 2021-04-14 PROCEDURE — 99999 PR PBB SHADOW E&M-EST. PATIENT-LVL III: ICD-10-PCS | Mod: PBBFAC,,, | Performed by: NURSE PRACTITIONER

## 2021-04-14 PROCEDURE — 93010 RHYTHM STRIP: ICD-10-PCS | Mod: S$GLB,,, | Performed by: INTERNAL MEDICINE

## 2021-04-14 PROCEDURE — 3008F BODY MASS INDEX DOCD: CPT | Mod: CPTII,S$GLB,, | Performed by: NURSE PRACTITIONER

## 2021-04-14 PROCEDURE — 3008F PR BODY MASS INDEX (BMI) DOCUMENTED: ICD-10-PCS | Mod: CPTII,S$GLB,, | Performed by: NURSE PRACTITIONER

## 2021-04-14 PROCEDURE — 99999 PR PBB SHADOW E&M-EST. PATIENT-LVL III: CPT | Mod: PBBFAC,,, | Performed by: NURSE PRACTITIONER

## 2021-04-14 PROCEDURE — 1126F AMNT PAIN NOTED NONE PRSNT: CPT | Mod: S$GLB,,, | Performed by: NURSE PRACTITIONER

## 2021-04-14 PROCEDURE — 93005 ELECTROCARDIOGRAM TRACING: CPT | Mod: S$GLB,,, | Performed by: INTERNAL MEDICINE

## 2021-04-14 PROCEDURE — 99214 OFFICE O/P EST MOD 30 MIN: CPT | Mod: S$GLB,,, | Performed by: NURSE PRACTITIONER

## 2021-05-12 ENCOUNTER — HOSPITAL ENCOUNTER (OUTPATIENT)
Dept: CARDIOLOGY | Facility: HOSPITAL | Age: 56
Discharge: HOME | End: 2021-05-12
Attending: NURSE PRACTITIONER
Payer: COMMERCIAL

## 2021-05-12 VITALS
WEIGHT: 233 LBS | DIASTOLIC BLOOD PRESSURE: 80 MMHG | HEIGHT: 72 IN | BODY MASS INDEX: 31.56 KG/M2 | SYSTOLIC BLOOD PRESSURE: 130 MMHG | HEART RATE: 70 BPM

## 2021-05-12 DIAGNOSIS — I48.19 PERSISTENT ATRIAL FIBRILLATION: ICD-10-CM

## 2021-05-12 LAB
ASCENDING AORTA: 3.61 CM
AV INDEX (PROSTH): 0.6
AV MEAN GRADIENT: 4 MMHG
AV PEAK GRADIENT: 9 MMHG
AV VALVE AREA: 3.18 CM2
AV VELOCITY RATIO: 0.56
BSA FOR ECHO PROCEDURE: 2.32 M2
CV ECHO LV RWT: 0.38 CM
DOP CALC AO PEAK VEL: 1.48 M/S
DOP CALC AO VTI: 32.79 CM
DOP CALC LVOT AREA: 5.3 CM2
DOP CALC LVOT DIAMETER: 2.6 CM
DOP CALC LVOT PEAK VEL: 0.83 M/S
DOP CALC LVOT STROKE VOLUME: 104.33 CM3
DOP CALCLVOT PEAK VEL VTI: 19.66 CM
E WAVE DECELERATION TIME: 199.68 MSEC
E/A RATIO: 1.67
E/E' RATIO: 4.8 M/S
ECHO LV POSTERIOR WALL: 1.03 CM (ref 0.6–1.1)
EJECTION FRACTION: 63 %
FRACTIONAL SHORTENING: 30 % (ref 28–44)
INTERVENTRICULAR SEPTUM: 1.03 CM (ref 0.6–1.1)
IVRT: 79.92 MSEC
LA MAJOR: 6.63 CM
LA MINOR: 6.65 CM
LA WIDTH: 5.49 CM
LEFT ATRIUM SIZE: 4.96 CM
LEFT ATRIUM VOLUME INDEX MOD: 70.9 ML/M2
LEFT ATRIUM VOLUME INDEX: 67.7 ML/M2
LEFT ATRIUM VOLUME MOD: 160.9 CM3
LEFT ATRIUM VOLUME: 153.69 CM3
LEFT INTERNAL DIMENSION IN SYSTOLE: 3.78 CM (ref 2.1–4)
LEFT VENTRICLE DIASTOLIC VOLUME INDEX: 62.02 ML/M2
LEFT VENTRICLE DIASTOLIC VOLUME: 140.78 ML
LEFT VENTRICLE MASS INDEX: 94 G/M2
LEFT VENTRICLE SYSTOLIC VOLUME INDEX: 27 ML/M2
LEFT VENTRICLE SYSTOLIC VOLUME: 61.23 ML
LEFT VENTRICULAR INTERNAL DIMENSION IN DIASTOLE: 5.39 CM (ref 3.5–6)
LEFT VENTRICULAR MASS: 214.34 G
LV LATERAL E/E' RATIO: 3.75 M/S
LV SEPTAL E/E' RATIO: 6.67 M/S
MV A" WAVE DURATION": 20.55 MSEC
MV PEAK A VEL: 0.36 M/S
MV PEAK E VEL: 0.6 M/S
MV STENOSIS PRESSURE HALF TIME: 57.91 MS
MV VALVE AREA P 1/2 METHOD: 3.8 CM2
PISA TR MAX VEL: 2.51 M/S
PULM VEIN S/D RATIO: 1
PV PEAK D VEL: 0.6 M/S
PV PEAK S VEL: 0.6 M/S
RA MAJOR: 5.79 CM
RA PRESSURE: 3 MMHG
RA WIDTH: 4.68 CM
RIGHT VENTRICULAR END-DIASTOLIC DIMENSION: 4.91 CM
RV TISSUE DOPPLER FREE WALL SYSTOLIC VELOCITY 1 (APICAL 4 CHAMBER VIEW): 13.23 CM/S
SINUS: 3.84 CM
STJ: 3.19 CM
TDI LATERAL: 0.16 M/S
TDI SEPTAL: 0.09 M/S
TDI: 0.13 M/S
TR MAX PG: 25 MMHG
TRICUSPID ANNULAR PLANE SYSTOLIC EXCURSION: 2.56 CM
TV REST PULMONARY ARTERY PRESSURE: 28 MMHG

## 2021-05-12 PROCEDURE — 93306 ECHO (CUPID ONLY): ICD-10-PCS | Mod: 26,,, | Performed by: INTERNAL MEDICINE

## 2021-05-12 PROCEDURE — 93306 TTE W/DOPPLER COMPLETE: CPT | Mod: 26,,, | Performed by: INTERNAL MEDICINE

## 2021-05-12 PROCEDURE — 93306 TTE W/DOPPLER COMPLETE: CPT

## 2021-07-01 ENCOUNTER — PATIENT MESSAGE (OUTPATIENT)
Dept: INTERNAL MEDICINE | Facility: CLINIC | Age: 56
End: 2021-07-01

## 2021-07-06 ENCOUNTER — PATIENT MESSAGE (OUTPATIENT)
Dept: ADMINISTRATIVE | Facility: HOSPITAL | Age: 56
End: 2021-07-06

## 2021-08-25 DIAGNOSIS — Z12.11 COLON CANCER SCREENING: ICD-10-CM

## 2021-10-05 ENCOUNTER — PATIENT MESSAGE (OUTPATIENT)
Dept: ADMINISTRATIVE | Facility: HOSPITAL | Age: 56
End: 2021-10-05

## 2021-11-03 ENCOUNTER — PATIENT MESSAGE (OUTPATIENT)
Dept: ELECTROPHYSIOLOGY | Facility: CLINIC | Age: 56
End: 2021-11-03
Payer: COMMERCIAL

## 2021-11-05 ENCOUNTER — OFFICE VISIT (OUTPATIENT)
Dept: INTERNAL MEDICINE | Facility: CLINIC | Age: 56
End: 2021-11-05
Payer: COMMERCIAL

## 2021-11-05 ENCOUNTER — LAB VISIT (OUTPATIENT)
Dept: LAB | Facility: HOSPITAL | Age: 56
End: 2021-11-05
Attending: INTERNAL MEDICINE
Payer: COMMERCIAL

## 2021-11-05 VITALS
DIASTOLIC BLOOD PRESSURE: 76 MMHG | WEIGHT: 235 LBS | HEIGHT: 72 IN | SYSTOLIC BLOOD PRESSURE: 128 MMHG | HEART RATE: 73 BPM | OXYGEN SATURATION: 98 % | BODY MASS INDEX: 31.83 KG/M2

## 2021-11-05 DIAGNOSIS — Z12.11 COLON CANCER SCREENING: ICD-10-CM

## 2021-11-05 DIAGNOSIS — I48.0 PAF (PAROXYSMAL ATRIAL FIBRILLATION): ICD-10-CM

## 2021-11-05 DIAGNOSIS — E78.5 HYPERLIPIDEMIA, UNSPECIFIED HYPERLIPIDEMIA TYPE: ICD-10-CM

## 2021-11-05 DIAGNOSIS — Z12.5 ENCOUNTER FOR PROSTATE CANCER SCREENING: ICD-10-CM

## 2021-11-05 DIAGNOSIS — Z00.00 ANNUAL PHYSICAL EXAM: Primary | ICD-10-CM

## 2021-11-05 DIAGNOSIS — I10 ESSENTIAL HYPERTENSION: ICD-10-CM

## 2021-11-05 DIAGNOSIS — Z00.00 ANNUAL PHYSICAL EXAM: ICD-10-CM

## 2021-11-05 LAB
ALBUMIN SERPL BCP-MCNC: 4 G/DL (ref 3.5–5.2)
ALP SERPL-CCNC: 60 U/L (ref 55–135)
ALT SERPL W/O P-5'-P-CCNC: 62 U/L (ref 10–44)
ANION GAP SERPL CALC-SCNC: 9 MMOL/L (ref 8–16)
AST SERPL-CCNC: 30 U/L (ref 10–40)
BASOPHILS # BLD AUTO: 0.02 K/UL (ref 0–0.2)
BASOPHILS NFR BLD: 0.4 % (ref 0–1.9)
BILIRUB SERPL-MCNC: 0.7 MG/DL (ref 0.1–1)
BUN SERPL-MCNC: 17 MG/DL (ref 6–20)
CALCIUM SERPL-MCNC: 9.9 MG/DL (ref 8.7–10.5)
CHLORIDE SERPL-SCNC: 104 MMOL/L (ref 95–110)
CHOLEST SERPL-MCNC: 222 MG/DL (ref 120–199)
CHOLEST/HDLC SERPL: 5.8 {RATIO} (ref 2–5)
CO2 SERPL-SCNC: 28 MMOL/L (ref 23–29)
COMPLEXED PSA SERPL-MCNC: 0.48 NG/ML (ref 0–4)
CREAT SERPL-MCNC: 1.3 MG/DL (ref 0.5–1.4)
DIFFERENTIAL METHOD: ABNORMAL
EOSINOPHIL # BLD AUTO: 0 K/UL (ref 0–0.5)
EOSINOPHIL NFR BLD: 0.6 % (ref 0–8)
ERYTHROCYTE [DISTWIDTH] IN BLOOD BY AUTOMATED COUNT: 12.5 % (ref 11.5–14.5)
EST. GFR  (AFRICAN AMERICAN): >60 ML/MIN/1.73 M^2
EST. GFR  (NON AFRICAN AMERICAN): >60 ML/MIN/1.73 M^2
GLUCOSE SERPL-MCNC: 112 MG/DL (ref 70–110)
HCT VFR BLD AUTO: 47.1 % (ref 40–54)
HDLC SERPL-MCNC: 38 MG/DL (ref 40–75)
HDLC SERPL: 17.1 % (ref 20–50)
HGB BLD-MCNC: 15.7 G/DL (ref 14–18)
IMM GRANULOCYTES # BLD AUTO: 0.01 K/UL (ref 0–0.04)
IMM GRANULOCYTES NFR BLD AUTO: 0.2 % (ref 0–0.5)
LDLC SERPL CALC-MCNC: 166.6 MG/DL (ref 63–159)
LYMPHOCYTES # BLD AUTO: 1.8 K/UL (ref 1–4.8)
LYMPHOCYTES NFR BLD: 37.5 % (ref 18–48)
MCH RBC QN AUTO: 32.7 PG (ref 27–31)
MCHC RBC AUTO-ENTMCNC: 33.3 G/DL (ref 32–36)
MCV RBC AUTO: 98 FL (ref 82–98)
MONOCYTES # BLD AUTO: 0.5 K/UL (ref 0.3–1)
MONOCYTES NFR BLD: 10.6 % (ref 4–15)
NEUTROPHILS # BLD AUTO: 2.5 K/UL (ref 1.8–7.7)
NEUTROPHILS NFR BLD: 50.7 % (ref 38–73)
NONHDLC SERPL-MCNC: 184 MG/DL
NRBC BLD-RTO: 0 /100 WBC
PLATELET # BLD AUTO: 232 K/UL (ref 150–450)
PMV BLD AUTO: 11.3 FL (ref 9.2–12.9)
POTASSIUM SERPL-SCNC: 5.4 MMOL/L (ref 3.5–5.1)
PROT SERPL-MCNC: 7.1 G/DL (ref 6–8.4)
RBC # BLD AUTO: 4.8 M/UL (ref 4.6–6.2)
SODIUM SERPL-SCNC: 141 MMOL/L (ref 136–145)
TRIGL SERPL-MCNC: 87 MG/DL (ref 30–150)
WBC # BLD AUTO: 4.83 K/UL (ref 3.9–12.7)

## 2021-11-05 PROCEDURE — 99396 PR PREVENTIVE VISIT,EST,40-64: ICD-10-PCS | Mod: S$GLB,,, | Performed by: INTERNAL MEDICINE

## 2021-11-05 PROCEDURE — 3008F PR BODY MASS INDEX (BMI) DOCUMENTED: ICD-10-PCS | Mod: CPTII,S$GLB,, | Performed by: INTERNAL MEDICINE

## 2021-11-05 PROCEDURE — 3074F PR MOST RECENT SYSTOLIC BLOOD PRESSURE < 130 MM HG: ICD-10-PCS | Mod: CPTII,S$GLB,, | Performed by: INTERNAL MEDICINE

## 2021-11-05 PROCEDURE — 85025 COMPLETE CBC W/AUTO DIFF WBC: CPT | Performed by: INTERNAL MEDICINE

## 2021-11-05 PROCEDURE — 84153 ASSAY OF PSA TOTAL: CPT | Performed by: INTERNAL MEDICINE

## 2021-11-05 PROCEDURE — 1159F PR MEDICATION LIST DOCUMENTED IN MEDICAL RECORD: ICD-10-PCS | Mod: CPTII,S$GLB,, | Performed by: INTERNAL MEDICINE

## 2021-11-05 PROCEDURE — 3078F DIAST BP <80 MM HG: CPT | Mod: CPTII,S$GLB,, | Performed by: INTERNAL MEDICINE

## 2021-11-05 PROCEDURE — 3008F BODY MASS INDEX DOCD: CPT | Mod: CPTII,S$GLB,, | Performed by: INTERNAL MEDICINE

## 2021-11-05 PROCEDURE — 99396 PREV VISIT EST AGE 40-64: CPT | Mod: S$GLB,,, | Performed by: INTERNAL MEDICINE

## 2021-11-05 PROCEDURE — 36415 COLL VENOUS BLD VENIPUNCTURE: CPT | Performed by: INTERNAL MEDICINE

## 2021-11-05 PROCEDURE — 3078F PR MOST RECENT DIASTOLIC BLOOD PRESSURE < 80 MM HG: ICD-10-PCS | Mod: CPTII,S$GLB,, | Performed by: INTERNAL MEDICINE

## 2021-11-05 PROCEDURE — 80061 LIPID PANEL: CPT | Performed by: INTERNAL MEDICINE

## 2021-11-05 PROCEDURE — 99999 PR PBB SHADOW E&M-EST. PATIENT-LVL III: CPT | Mod: PBBFAC,,, | Performed by: INTERNAL MEDICINE

## 2021-11-05 PROCEDURE — 1160F RVW MEDS BY RX/DR IN RCRD: CPT | Mod: CPTII,S$GLB,, | Performed by: INTERNAL MEDICINE

## 2021-11-05 PROCEDURE — 1160F PR REVIEW ALL MEDS BY PRESCRIBER/CLIN PHARMACIST DOCUMENTED: ICD-10-PCS | Mod: CPTII,S$GLB,, | Performed by: INTERNAL MEDICINE

## 2021-11-05 PROCEDURE — 1159F MED LIST DOCD IN RCRD: CPT | Mod: CPTII,S$GLB,, | Performed by: INTERNAL MEDICINE

## 2021-11-05 PROCEDURE — 3074F SYST BP LT 130 MM HG: CPT | Mod: CPTII,S$GLB,, | Performed by: INTERNAL MEDICINE

## 2021-11-05 PROCEDURE — 80053 COMPREHEN METABOLIC PANEL: CPT | Performed by: INTERNAL MEDICINE

## 2021-11-05 PROCEDURE — 99999 PR PBB SHADOW E&M-EST. PATIENT-LVL III: ICD-10-PCS | Mod: PBBFAC,,, | Performed by: INTERNAL MEDICINE

## 2021-11-26 ENCOUNTER — RESEARCH ENCOUNTER (OUTPATIENT)
Dept: RESEARCH | Facility: HOSPITAL | Age: 56
End: 2021-11-26
Payer: COMMERCIAL

## 2021-11-29 ENCOUNTER — OFFICE VISIT (OUTPATIENT)
Dept: ELECTROPHYSIOLOGY | Facility: CLINIC | Age: 56
End: 2021-11-29
Payer: COMMERCIAL

## 2021-11-29 ENCOUNTER — RESEARCH ENCOUNTER (OUTPATIENT)
Dept: RESEARCH | Facility: HOSPITAL | Age: 56
End: 2021-11-29

## 2021-11-29 ENCOUNTER — HOSPITAL ENCOUNTER (OUTPATIENT)
Dept: CARDIOLOGY | Facility: CLINIC | Age: 56
Discharge: HOME | End: 2021-11-29
Payer: COMMERCIAL

## 2021-11-29 ENCOUNTER — PATIENT OUTREACH (OUTPATIENT)
Dept: ADMINISTRATIVE | Facility: OTHER | Age: 56
End: 2021-11-29
Payer: COMMERCIAL

## 2021-11-29 VITALS
BODY MASS INDEX: 33.07 KG/M2 | SYSTOLIC BLOOD PRESSURE: 154 MMHG | WEIGHT: 243.81 LBS | DIASTOLIC BLOOD PRESSURE: 97 MMHG | HEART RATE: 49 BPM

## 2021-11-29 DIAGNOSIS — I48.0 PAROXYSMAL ATRIAL FIBRILLATION: Primary | ICD-10-CM

## 2021-11-29 DIAGNOSIS — Z51.81 ENCOUNTER FOR MONITORING FLECAINIDE THERAPY: ICD-10-CM

## 2021-11-29 DIAGNOSIS — Z79.899 ENCOUNTER FOR MONITORING FLECAINIDE THERAPY: ICD-10-CM

## 2021-11-29 DIAGNOSIS — Z92.89 HISTORY OF CARDIOVERSION: ICD-10-CM

## 2021-11-29 DIAGNOSIS — I49.8 OTHER SPECIFIED CARDIAC ARRHYTHMIAS: ICD-10-CM

## 2021-11-29 DIAGNOSIS — Z79.01 ANTICOAGULANT LONG-TERM USE: ICD-10-CM

## 2021-11-29 DIAGNOSIS — I10 ESSENTIAL HYPERTENSION: ICD-10-CM

## 2021-11-29 PROCEDURE — 99214 PR OFFICE/OUTPT VISIT, EST, LEVL IV, 30-39 MIN: ICD-10-PCS | Mod: S$GLB,,, | Performed by: NURSE PRACTITIONER

## 2021-11-29 PROCEDURE — 99999 PR PBB SHADOW E&M-EST. PATIENT-LVL III: CPT | Mod: PBBFAC,,, | Performed by: NURSE PRACTITIONER

## 2021-11-29 PROCEDURE — 99214 OFFICE O/P EST MOD 30 MIN: CPT | Mod: S$GLB,,, | Performed by: NURSE PRACTITIONER

## 2021-11-29 PROCEDURE — 99999 PR PBB SHADOW E&M-EST. PATIENT-LVL III: ICD-10-PCS | Mod: PBBFAC,,, | Performed by: NURSE PRACTITIONER

## 2021-12-01 ENCOUNTER — TELEPHONE (OUTPATIENT)
Dept: ELECTROPHYSIOLOGY | Facility: CLINIC | Age: 56
End: 2021-12-01
Payer: COMMERCIAL

## 2021-12-01 DIAGNOSIS — I48.19 PERSISTENT ATRIAL FIBRILLATION: Primary | ICD-10-CM

## 2021-12-01 DIAGNOSIS — Z01.818 PRE-OP TESTING: ICD-10-CM

## 2021-12-01 DIAGNOSIS — R00.2 PALPITATION: ICD-10-CM

## 2021-12-01 DIAGNOSIS — I10 ESSENTIAL HYPERTENSION: ICD-10-CM

## 2021-12-01 DIAGNOSIS — Z79.01 CHRONIC ANTICOAGULATION: ICD-10-CM

## 2021-12-01 DIAGNOSIS — I48.3 TYPICAL ATRIAL FLUTTER: ICD-10-CM

## 2021-12-02 ENCOUNTER — PATIENT MESSAGE (OUTPATIENT)
Dept: ELECTROPHYSIOLOGY | Facility: CLINIC | Age: 56
End: 2021-12-02
Payer: COMMERCIAL

## 2021-12-02 DIAGNOSIS — Z01.818 PRE-OP TESTING: Primary | ICD-10-CM

## 2021-12-03 RX ORDER — PANTOPRAZOLE SODIUM 40 MG/1
40 TABLET, DELAYED RELEASE ORAL DAILY
Qty: 44 TABLET | Refills: 0 | Status: SHIPPED | OUTPATIENT
Start: 2021-12-03 | End: 2021-12-03

## 2021-12-21 ENCOUNTER — CLINICAL SUPPORT (OUTPATIENT)
Dept: CARDIOLOGY | Facility: HOSPITAL | Age: 56
End: 2021-12-21
Attending: NURSE PRACTITIONER
Payer: COMMERCIAL

## 2021-12-21 DIAGNOSIS — I48.0 PAROXYSMAL ATRIAL FIBRILLATION: ICD-10-CM

## 2021-12-21 PROCEDURE — 93227 HOLTER MONITOR - 48 HOUR (CUPID ONLY): ICD-10-PCS | Mod: ,,, | Performed by: INTERNAL MEDICINE

## 2021-12-21 PROCEDURE — 93226 XTRNL ECG REC<48 HR SCAN A/R: CPT

## 2021-12-21 PROCEDURE — 93227 XTRNL ECG REC<48 HR R&I: CPT | Mod: ,,, | Performed by: INTERNAL MEDICINE

## 2021-12-22 ENCOUNTER — TELEPHONE (OUTPATIENT)
Dept: RESEARCH | Facility: HOSPITAL | Age: 56
End: 2021-12-22
Payer: COMMERCIAL

## 2021-12-27 LAB
OHS CV EVENT MONITOR DAY: 0
OHS CV HOLTER LENGTH DECIMAL HOURS: 48
OHS CV HOLTER LENGTH HOURS: 48
OHS CV HOLTER LENGTH MINUTES: 0
OHS CV HOLTER SINUS AVERAGE HR: 60
OHS CV HOLTER SINUS MAX HR: 129
OHS CV HOLTER SINUS MIN HR: 43

## 2021-12-29 ENCOUNTER — PATIENT MESSAGE (OUTPATIENT)
Dept: ELECTROPHYSIOLOGY | Facility: CLINIC | Age: 56
End: 2021-12-29
Payer: COMMERCIAL

## 2022-01-05 ENCOUNTER — LAB VISIT (OUTPATIENT)
Dept: LAB | Facility: HOSPITAL | Age: 57
End: 2022-01-05
Attending: INTERNAL MEDICINE
Payer: COMMERCIAL

## 2022-01-05 DIAGNOSIS — I10 ESSENTIAL HYPERTENSION: ICD-10-CM

## 2022-01-05 DIAGNOSIS — R00.2 PALPITATION: ICD-10-CM

## 2022-01-05 DIAGNOSIS — Z79.01 CHRONIC ANTICOAGULATION: ICD-10-CM

## 2022-01-05 DIAGNOSIS — I48.19 PERSISTENT ATRIAL FIBRILLATION: ICD-10-CM

## 2022-01-05 DIAGNOSIS — Z01.818 PRE-OP TESTING: ICD-10-CM

## 2022-01-05 DIAGNOSIS — I48.3 TYPICAL ATRIAL FLUTTER: ICD-10-CM

## 2022-01-05 PROCEDURE — 85025 COMPLETE CBC W/AUTO DIFF WBC: CPT | Performed by: INTERNAL MEDICINE

## 2022-01-05 PROCEDURE — 36415 COLL VENOUS BLD VENIPUNCTURE: CPT | Performed by: INTERNAL MEDICINE

## 2022-01-05 PROCEDURE — 85730 THROMBOPLASTIN TIME PARTIAL: CPT | Performed by: INTERNAL MEDICINE

## 2022-01-05 PROCEDURE — 80048 BASIC METABOLIC PNL TOTAL CA: CPT | Performed by: INTERNAL MEDICINE

## 2022-01-05 PROCEDURE — 85610 PROTHROMBIN TIME: CPT | Performed by: INTERNAL MEDICINE

## 2022-01-06 LAB
ANION GAP SERPL CALC-SCNC: 9 MMOL/L (ref 8–16)
APTT BLDCRRT: 27.9 SEC (ref 21–32)
BASOPHILS # BLD AUTO: 0.03 K/UL (ref 0–0.2)
BASOPHILS NFR BLD: 0.4 % (ref 0–1.9)
BUN SERPL-MCNC: 21 MG/DL (ref 6–20)
CALCIUM SERPL-MCNC: 9 MG/DL (ref 8.7–10.5)
CHLORIDE SERPL-SCNC: 105 MMOL/L (ref 95–110)
CO2 SERPL-SCNC: 29 MMOL/L (ref 23–29)
CREAT SERPL-MCNC: 1.1 MG/DL (ref 0.5–1.4)
DIFFERENTIAL METHOD: ABNORMAL
EOSINOPHIL # BLD AUTO: 0 K/UL (ref 0–0.5)
EOSINOPHIL NFR BLD: 0.4 % (ref 0–8)
ERYTHROCYTE [DISTWIDTH] IN BLOOD BY AUTOMATED COUNT: 12.4 % (ref 11.5–14.5)
EST. GFR  (AFRICAN AMERICAN): >60 ML/MIN/1.73 M^2
EST. GFR  (NON AFRICAN AMERICAN): >60 ML/MIN/1.73 M^2
GLUCOSE SERPL-MCNC: 83 MG/DL (ref 70–110)
HCT VFR BLD AUTO: 41.3 % (ref 40–54)
HGB BLD-MCNC: 13.5 G/DL (ref 14–18)
IMM GRANULOCYTES # BLD AUTO: 0.05 K/UL (ref 0–0.04)
IMM GRANULOCYTES NFR BLD AUTO: 0.7 % (ref 0–0.5)
INR PPP: 0.9 (ref 0.8–1.2)
LYMPHOCYTES # BLD AUTO: 2.2 K/UL (ref 1–4.8)
LYMPHOCYTES NFR BLD: 29.6 % (ref 18–48)
MCH RBC QN AUTO: 32.2 PG (ref 27–31)
MCHC RBC AUTO-ENTMCNC: 32.7 G/DL (ref 32–36)
MCV RBC AUTO: 99 FL (ref 82–98)
MONOCYTES # BLD AUTO: 0.7 K/UL (ref 0.3–1)
MONOCYTES NFR BLD: 9.2 % (ref 4–15)
NEUTROPHILS # BLD AUTO: 4.5 K/UL (ref 1.8–7.7)
NEUTROPHILS NFR BLD: 59.7 % (ref 38–73)
NRBC BLD-RTO: 0 /100 WBC
PLATELET # BLD AUTO: 327 K/UL (ref 150–450)
PMV BLD AUTO: 10.5 FL (ref 9.2–12.9)
POTASSIUM SERPL-SCNC: 3.6 MMOL/L (ref 3.5–5.1)
PROTHROMBIN TIME: 10.4 SEC (ref 9–12.5)
RBC # BLD AUTO: 4.19 M/UL (ref 4.6–6.2)
SODIUM SERPL-SCNC: 143 MMOL/L (ref 136–145)
WBC # BLD AUTO: 7.57 K/UL (ref 3.9–12.7)

## 2022-01-08 ENCOUNTER — PATIENT MESSAGE (OUTPATIENT)
Dept: ELECTROPHYSIOLOGY | Facility: CLINIC | Age: 57
End: 2022-01-08
Payer: COMMERCIAL

## 2022-01-10 ENCOUNTER — TELEPHONE (OUTPATIENT)
Dept: ELECTROPHYSIOLOGY | Facility: CLINIC | Age: 57
End: 2022-01-10
Payer: COMMERCIAL

## 2022-01-10 ENCOUNTER — RESEARCH ENCOUNTER (OUTPATIENT)
Dept: RESEARCH | Facility: HOSPITAL | Age: 57
End: 2022-01-10
Payer: COMMERCIAL

## 2022-01-10 NOTE — PROGRESS NOTES
Date of Call: 10 W[address] 2022    Sponsor: [employer]     Study Title/IRB Number: [id number]    Principle Investigator: Dr Rafael Menjivar    Call made by: Jalil Hernandez [facility]     Spoke with patient for his one month follow up. Patient provided answers for all study required surveys and questionnaires.  The patient reported taking Eliquis as prescribed and no AEs to report .

## 2022-01-10 NOTE — TELEPHONE ENCOUNTER
Spoke to Patient.     CONFIRMED procedure arrival time of 5:30 am on 1/12/2022 for Cardiac Ablation procedure with Dr Shell.    Reiterated instructions including:  -Directions to check in desk.    -NPO after midnight night prior to procedure.    -High importance of HOLDING Eliquis on the morning of the procedure.     -Confirmed compliance of ELiquis and Protonix as prescribed.      -Pre-procedure LABS reviewed with no alerts noted.   -COVID test Pending. To be done on admit. Patient reports that he was COVID+ on 1/1/2022 and is now asyptomatic.      -Confirmed no shortness of breath or signs of bleeding in the past 30 days.      -Reviewed current visitor policy    Patient verbalizes understanding of above, denied any further questions and appreciates call.

## 2022-01-11 ENCOUNTER — TELEPHONE (OUTPATIENT)
Dept: ELECTROPHYSIOLOGY | Facility: CLINIC | Age: 57
End: 2022-01-11
Payer: COMMERCIAL

## 2022-01-11 PROBLEM — I48.0 PAROXYSMAL ATRIAL FIBRILLATION: Status: ACTIVE | Noted: 2020-07-07

## 2022-01-11 NOTE — TELEPHONE ENCOUNTER
----- Message from Yesenia oCle MA sent at 1/11/2022  2:30 PM CST -----  Regarding: FW: please call  Please advise  ----- Message -----  From: Zoe Ugalde  Sent: 1/11/2022   2:16 PM CST  To: [facility] Staff  Subject: please call                                      The pt is calling to speak with Georgina. Please call him back @ [phone]. Thanks, Zoe

## 2022-01-11 NOTE — TELEPHONE ENCOUNTER
Spoke with patient who states that he will need a note for work his post procedure restrictions listed. Patient advised that he will receive his patient specific post procedure instructions and restrictions prior to discharge post procedure and he should notify the staff prior to departure that a work note is needed. Patient instructions to call me at anytime post procedure if anything further is needed or required by his employer. Understanding verbalized.

## 2022-01-11 NOTE — HPI
"Cancel if NSR.  AF/Afl/ RADHIKA/CTI/PVI.    56 y.o. 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 He works at [employer] and gets yearly on-site physical exams with electrocardiograms. Diagnosed with Afib March 2020.    LAUREN in 2017 which noted no ischemia and showed a structurally normal left ventricle with mild left atrial enlargement (LVEF 55%).      On 8/6/2020: Cardioversion was successfully performed with restoration of normal sinus rhythm. He remains on eliquis for CVA prophylaxis (CHADSVASc 1).  Today he says he hasn't noticed any overt symptom changes recently. However, he hasn't been exercising. No worsening JACOME, CP, palps, LH, syncope.     Last seen in Ep clinic on 11/29/2021: Shari Dc NP-He is currently taking eliquis 5mg BID for stroke prophylaxis and denies significant bleeding episodes. He is currently being treated with flecainide 100mg BID for rhythm control and toprol 12.5mg daily for HR control.        5/12/2021: TTE: The left ventricle is normal in size with normal systolic function. The estimated ejection fraction is 63%. Normal left ventricular diastolic function.Severe left atrial enlargement.Normal right ventricular size with normal right ventricular systolic function.Moderate right atrial enlargement.Mild mitral regurgitation.Mild tricuspid regurgitation.Normal central venous pressure (3 mmHg).The estimated PA systolic pressure is 28 mmHg. Trivial pericardial effusion.    8/6/2020: RADHIKA: RADHIKA to evaluate SURINDER prior DCCV for atrial fibrillation. No thrombus is present in the appendage.Low normal left ventricular systolic function. The estimated ejection fraction is 50%.Mild right ventricular enlargement. Low normal right ventricular systolic function. Mild mitral regurgitation.    Dysphagia or odynophagia:  {Blank single:32270::"Yes","No"}  Liver Disease, esophageal disease, or known varices:  {Blank " "single:19197::"Yes","No"}  Upper GI Bleeding: {Blank single:19197::"Yes","No"}  Snoring:  {Blank single:19197::"Yes","No"}  Sleep Apnea:  {Blank single:19197::"Yes","No"}  Prior neck surgery or radiation:  {Blank single:19197::"Yes","No"}  History of anesthetic difficulties:  {Blank single:19197::"Yes","No"}  Family history of anesthetic difficulties:  {Blank single:19197::"Yes","No"}  Last oral intake:  {Blank single:19197::"12 hours ago","24 hours ago"}  Able to move neck in all directions:  {Blank single:19197::"Yes","No"}                "

## 2022-01-12 ENCOUNTER — HOSPITAL ENCOUNTER (OUTPATIENT)
Facility: HOSPITAL | Age: 57
Discharge: HOME | End: 2022-01-12
Attending: INTERNAL MEDICINE | Admitting: INTERNAL MEDICINE
Payer: COMMERCIAL

## 2022-01-12 ENCOUNTER — ANESTHESIA EVENT (OUTPATIENT)
Dept: MEDSURG UNIT | Facility: HOSPITAL | Age: 57
End: 2022-01-12
Payer: COMMERCIAL

## 2022-01-12 ENCOUNTER — ANESTHESIA (OUTPATIENT)
Dept: MEDSURG UNIT | Facility: HOSPITAL | Age: 57
End: 2022-01-12
Payer: COMMERCIAL

## 2022-01-12 VITALS
DIASTOLIC BLOOD PRESSURE: 84 MMHG | RESPIRATION RATE: 16 BRPM | HEART RATE: 72 BPM | HEIGHT: 72 IN | BODY MASS INDEX: 30.48 KG/M2 | OXYGEN SATURATION: 96 % | SYSTOLIC BLOOD PRESSURE: 127 MMHG | TEMPERATURE: 98 F | WEIGHT: 225 LBS

## 2022-01-12 DIAGNOSIS — Z01.818 PRE-OP TESTING: Primary | ICD-10-CM

## 2022-01-12 DIAGNOSIS — I49.9 ARRHYTHMIA: ICD-10-CM

## 2022-01-12 DIAGNOSIS — Z98.890 S/P ABLATION OF ATRIAL FIBRILLATION: ICD-10-CM

## 2022-01-12 DIAGNOSIS — I48.91 ATRIAL FIBRILLATION: ICD-10-CM

## 2022-01-12 DIAGNOSIS — I48.3 TYPICAL ATRIAL FLUTTER: ICD-10-CM

## 2022-01-12 DIAGNOSIS — I48.19 PERSISTENT ATRIAL FIBRILLATION: ICD-10-CM

## 2022-01-12 DIAGNOSIS — Z86.79 S/P ABLATION OF ATRIAL FIBRILLATION: ICD-10-CM

## 2022-01-12 PROCEDURE — 93010 ELECTROCARDIOGRAM REPORT: CPT | Mod: ,,, | Performed by: INTERNAL MEDICINE

## 2022-01-12 PROCEDURE — 93655 ICAR CATH ABLTJ DSCRT ARRHYT: CPT | Performed by: INTERNAL MEDICINE

## 2022-01-12 PROCEDURE — C1894 INTRO/SHEATH, NON-LASER: HCPCS | Performed by: INTERNAL MEDICINE

## 2022-01-12 PROCEDURE — 25000003 PHARM REV CODE 250: Performed by: NURSE ANESTHETIST, CERTIFIED REGISTERED

## 2022-01-12 PROCEDURE — C1753 CATH, INTRAVAS ULTRASOUND: HCPCS | Performed by: INTERNAL MEDICINE

## 2022-01-12 PROCEDURE — 93655 ICAR CATH ABLTJ DSCRT ARRHYT: CPT | Mod: ,,, | Performed by: INTERNAL MEDICINE

## 2022-01-12 PROCEDURE — 93010 EKG 12-LEAD: ICD-10-PCS | Mod: ,,, | Performed by: INTERNAL MEDICINE

## 2022-01-12 PROCEDURE — 63600175 PHARM REV CODE 636 W HCPCS: Performed by: INTERNAL MEDICINE

## 2022-01-12 PROCEDURE — D9220A PRA ANESTHESIA: ICD-10-PCS | Mod: ANES,,, | Performed by: ANESTHESIOLOGY

## 2022-01-12 PROCEDURE — 93656 PR ELECTROPHYS EVAL, COMPREHEN, W/ABLATION OF ATRIAL FIBR: ICD-10-PCS | Mod: ,,, | Performed by: INTERNAL MEDICINE

## 2022-01-12 PROCEDURE — D9220A PRA ANESTHESIA: Mod: CRNA,,, | Performed by: NURSE ANESTHETIST, CERTIFIED REGISTERED

## 2022-01-12 PROCEDURE — C1731 CATH, EP, 20 OR MORE ELEC: HCPCS | Performed by: INTERNAL MEDICINE

## 2022-01-12 PROCEDURE — 37000008 HC ANESTHESIA 1ST 15 MINUTES: Performed by: INTERNAL MEDICINE

## 2022-01-12 PROCEDURE — 93656 COMPRE EP EVAL ABLTJ ATR FIB: CPT | Mod: ,,, | Performed by: INTERNAL MEDICINE

## 2022-01-12 PROCEDURE — 93005 ELECTROCARDIOGRAM TRACING: CPT

## 2022-01-12 PROCEDURE — 25000003 PHARM REV CODE 250: Performed by: INTERNAL MEDICINE

## 2022-01-12 PROCEDURE — 37000009 HC ANESTHESIA EA ADD 15 MINS: Performed by: INTERNAL MEDICINE

## 2022-01-12 PROCEDURE — 63600175 PHARM REV CODE 636 W HCPCS: Performed by: NURSE ANESTHETIST, CERTIFIED REGISTERED

## 2022-01-12 PROCEDURE — 36620 INSERTION CATHETER ARTERY: CPT | Mod: 59,,, | Performed by: ANESTHESIOLOGY

## 2022-01-12 PROCEDURE — C1730 CATH, EP, 19 OR FEW ELECT: HCPCS | Performed by: INTERNAL MEDICINE

## 2022-01-12 PROCEDURE — D9220A PRA ANESTHESIA: Mod: ANES,,, | Performed by: ANESTHESIOLOGY

## 2022-01-12 PROCEDURE — 27201037 HC PRESSURE MONITORING SET UP

## 2022-01-12 PROCEDURE — 93655 PR ABLATE ARRHYTHMIA ADD ON: ICD-10-PCS | Mod: ,,, | Performed by: INTERNAL MEDICINE

## 2022-01-12 PROCEDURE — C1766 INTRO/SHEATH,STRBLE,NON-PEEL: HCPCS | Performed by: INTERNAL MEDICINE

## 2022-01-12 PROCEDURE — 93005 ELECTROCARDIOGRAM TRACING: CPT | Mod: 59

## 2022-01-12 PROCEDURE — 36620 PR INSERT CATH,ART,PERCUT,SHORTTERM: ICD-10-PCS | Mod: 59,,, | Performed by: ANESTHESIOLOGY

## 2022-01-12 PROCEDURE — D9220A PRA ANESTHESIA: ICD-10-PCS | Mod: CRNA,,, | Performed by: NURSE ANESTHETIST, CERTIFIED REGISTERED

## 2022-01-12 PROCEDURE — 93656 COMPRE EP EVAL ABLTJ ATR FIB: CPT | Performed by: INTERNAL MEDICINE

## 2022-01-12 PROCEDURE — 27201423 OPTIME MED/SURG SUP & DEVICES STERILE SUPPLY: Performed by: INTERNAL MEDICINE

## 2022-01-12 PROCEDURE — C1732 CATH, EP, DIAG/ABL, 3D/VECT: HCPCS | Performed by: INTERNAL MEDICINE

## 2022-01-12 PROCEDURE — 27800505 HC CATH, RADIAL ARTERY KIT: Performed by: NURSE ANESTHETIST, CERTIFIED REGISTERED

## 2022-01-12 RX ORDER — PHENYLEPHRINE HYDROCHLORIDE 10 MG/ML
INJECTION INTRAVENOUS CONTINUOUS PRN
Status: DISCONTINUED | OUTPATIENT
Start: 2022-01-12 | End: 2022-01-12

## 2022-01-12 RX ORDER — HEPARIN SOD,PORCINE/0.9 % NACL 1000/500ML
INTRAVENOUS SOLUTION INTRAVENOUS
Status: DISCONTINUED | OUTPATIENT
Start: 2022-01-12 | End: 2022-01-12 | Stop reason: HOSPADM

## 2022-01-12 RX ORDER — FAMOTIDINE 10 MG/ML
INJECTION INTRAVENOUS
Status: DISCONTINUED | OUTPATIENT
Start: 2022-01-12 | End: 2022-01-12

## 2022-01-12 RX ORDER — SUCRALFATE 1 G/1
1 TABLET ORAL 4 TIMES DAILY
Qty: 120 TABLET | Refills: 0 | Status: SHIPPED | OUTPATIENT
Start: 2022-01-12 | End: 2022-02-11

## 2022-01-12 RX ORDER — PHENYLEPHRINE HYDROCHLORIDE 10 MG/ML
INJECTION INTRAVENOUS
Status: DISCONTINUED | OUTPATIENT
Start: 2022-01-12 | End: 2022-01-12

## 2022-01-12 RX ORDER — ACETAMINOPHEN 325 MG/1
650 TABLET ORAL EVERY 4 HOURS PRN
Status: DISCONTINUED | OUTPATIENT
Start: 2022-01-12 | End: 2022-01-12 | Stop reason: HOSPADM

## 2022-01-12 RX ORDER — LIDOCAINE HYDROCHLORIDE 20 MG/ML
INJECTION, SOLUTION INFILTRATION; PERINEURAL
Status: DISCONTINUED | OUTPATIENT
Start: 2022-01-12 | End: 2022-01-12 | Stop reason: HOSPADM

## 2022-01-12 RX ORDER — PROPOFOL 10 MG/ML
VIAL (ML) INTRAVENOUS
Status: DISCONTINUED | OUTPATIENT
Start: 2022-01-12 | End: 2022-01-12

## 2022-01-12 RX ORDER — SODIUM CHLORIDE 0.9 % (FLUSH) 0.9 %
10 SYRINGE (ML) INJECTION
Status: DISCONTINUED | OUTPATIENT
Start: 2022-01-12 | End: 2022-01-12 | Stop reason: HOSPADM

## 2022-01-12 RX ORDER — DEXAMETHASONE SODIUM PHOSPHATE 4 MG/ML
INJECTION, SOLUTION INTRA-ARTICULAR; INTRALESIONAL; INTRAMUSCULAR; INTRAVENOUS; SOFT TISSUE
Status: DISCONTINUED | OUTPATIENT
Start: 2022-01-12 | End: 2022-01-12

## 2022-01-12 RX ORDER — HEPARIN SODIUM 10000 [USP'U]/100ML
INJECTION, SOLUTION INTRAVENOUS CONTINUOUS PRN
Status: DISCONTINUED | OUTPATIENT
Start: 2022-01-12 | End: 2022-01-12

## 2022-01-12 RX ORDER — LIDOCAINE HYDROCHLORIDE 20 MG/ML
INJECTION INTRAVENOUS
Status: DISCONTINUED | OUTPATIENT
Start: 2022-01-12 | End: 2022-01-12

## 2022-01-12 RX ORDER — FENTANYL CITRATE 50 UG/ML
25 INJECTION, SOLUTION INTRAMUSCULAR; INTRAVENOUS EVERY 5 MIN PRN
Status: DISCONTINUED | OUTPATIENT
Start: 2022-01-12 | End: 2022-01-12 | Stop reason: HOSPADM

## 2022-01-12 RX ORDER — MIDAZOLAM HYDROCHLORIDE 1 MG/ML
INJECTION, SOLUTION INTRAMUSCULAR; INTRAVENOUS
Status: DISCONTINUED | OUTPATIENT
Start: 2022-01-12 | End: 2022-01-12

## 2022-01-12 RX ORDER — FENTANYL CITRATE 50 UG/ML
INJECTION, SOLUTION INTRAMUSCULAR; INTRAVENOUS
Status: DISCONTINUED | OUTPATIENT
Start: 2022-01-12 | End: 2022-01-12

## 2022-01-12 RX ORDER — FUROSEMIDE 20 MG/1
20 TABLET ORAL DAILY PRN
Qty: 10 TABLET | Refills: 0 | Status: SHIPPED | OUTPATIENT
Start: 2022-01-12 | End: 2022-03-16

## 2022-01-12 RX ORDER — HEPARIN SODIUM 1000 [USP'U]/ML
INJECTION, SOLUTION INTRAVENOUS; SUBCUTANEOUS
Status: DISCONTINUED | OUTPATIENT
Start: 2022-01-12 | End: 2022-01-12

## 2022-01-12 RX ORDER — PROCHLORPERAZINE EDISYLATE 5 MG/ML
5 INJECTION INTRAMUSCULAR; INTRAVENOUS EVERY 30 MIN PRN
Status: DISCONTINUED | OUTPATIENT
Start: 2022-01-12 | End: 2022-01-12 | Stop reason: HOSPADM

## 2022-01-12 RX ORDER — PROTAMINE SULFATE 10 MG/ML
INJECTION, SOLUTION INTRAVENOUS
Status: DISCONTINUED | OUTPATIENT
Start: 2022-01-12 | End: 2022-01-12

## 2022-01-12 RX ORDER — ONDANSETRON 2 MG/ML
INJECTION INTRAMUSCULAR; INTRAVENOUS
Status: DISCONTINUED | OUTPATIENT
Start: 2022-01-12 | End: 2022-01-12

## 2022-01-12 RX ORDER — SUCCINYLCHOLINE CHLORIDE 20 MG/ML
INJECTION INTRAMUSCULAR; INTRAVENOUS
Status: DISCONTINUED | OUTPATIENT
Start: 2022-01-12 | End: 2022-01-12

## 2022-01-12 RX ADMIN — PROTAMINE SULFATE 75 MG: 10 INJECTION, SOLUTION INTRAVENOUS at 11:01

## 2022-01-12 RX ADMIN — HEPARIN SODIUM 6000 UNITS: 1000 INJECTION INTRAVENOUS; SUBCUTANEOUS at 09:01

## 2022-01-12 RX ADMIN — PHENYLEPHRINE HYDROCHLORIDE 100 MCG: 10 INJECTION, SOLUTION INTRAMUSCULAR; INTRAVENOUS; SUBCUTANEOUS at 08:01

## 2022-01-12 RX ADMIN — ONDANSETRON 4 MG: 2 INJECTION INTRAMUSCULAR; INTRAVENOUS at 10:01

## 2022-01-12 RX ADMIN — LIDOCAINE HYDROCHLORIDE 100 MG: 20 INJECTION, SOLUTION INTRAVENOUS at 08:01

## 2022-01-12 RX ADMIN — PROPOFOL 150 MG: 10 INJECTION, EMULSION INTRAVENOUS at 08:01

## 2022-01-12 RX ADMIN — HEPARIN SODIUM AND DEXTROSE 12 UNITS/KG/HR: 10000; 5 INJECTION INTRAVENOUS at 09:01

## 2022-01-12 RX ADMIN — SUCCINYLCHOLINE CHLORIDE 120 MG: 20 INJECTION, SOLUTION INTRAMUSCULAR; INTRAVENOUS at 08:01

## 2022-01-12 RX ADMIN — SODIUM CHLORIDE: 0.9 INJECTION, SOLUTION INTRAVENOUS at 08:01

## 2022-01-12 RX ADMIN — HEPARIN SODIUM 15000 UNITS: 1000 INJECTION INTRAVENOUS; SUBCUTANEOUS at 09:01

## 2022-01-12 RX ADMIN — MIDAZOLAM HYDROCHLORIDE 2 MG: 1 INJECTION, SOLUTION INTRAMUSCULAR; INTRAVENOUS at 08:01

## 2022-01-12 RX ADMIN — FENTANYL CITRATE 50 MCG: 50 INJECTION, SOLUTION INTRAMUSCULAR; INTRAVENOUS at 08:01

## 2022-01-12 RX ADMIN — PHENYLEPHRINE HYDROCHLORIDE 0.25 MCG/KG/MIN: 10 INJECTION INTRAVENOUS at 08:01

## 2022-01-12 RX ADMIN — FENTANYL CITRATE 50 MCG: 50 INJECTION, SOLUTION INTRAMUSCULAR; INTRAVENOUS at 09:01

## 2022-01-12 RX ADMIN — HEPARIN SODIUM 3000 UNITS: 1000 INJECTION INTRAVENOUS; SUBCUTANEOUS at 10:01

## 2022-01-12 RX ADMIN — FAMOTIDINE 20 MG: 10 INJECTION, SOLUTION INTRAVENOUS at 08:01

## 2022-01-12 RX ADMIN — SODIUM CHLORIDE, SODIUM GLUCONATE, SODIUM ACETATE, POTASSIUM CHLORIDE, MAGNESIUM CHLORIDE, SODIUM PHOSPHATE, DIBASIC, AND POTASSIUM PHOSPHATE: .53; .5; .37; .037; .03; .012; .00082 INJECTION, SOLUTION INTRAVENOUS at 08:01

## 2022-01-12 RX ADMIN — DEXAMETHASONE SODIUM PHOSPHATE 4 MG: 4 INJECTION, SOLUTION INTRAMUSCULAR; INTRAVENOUS at 08:01

## 2022-01-12 NOTE — ANESTHESIA POSTPROCEDURE EVALUATION
Anesthesia Post Evaluation    Patient: Jhonatan Sofia    Procedure(s) Performed: Procedure(s) (LRB):  Ablation atrial fibrillation (N/A)  Ablation, Atrial Flutter, Typical (N/A)    Final Anesthesia Type: general      Patient location during evaluation: Cath Lab  Patient participation: Yes- Able to Participate  Level of consciousness: awake and alert  Post-procedure vital signs: reviewed and stable  Pain management: adequate  Airway patency: patent    PONV status at discharge: No PONV  Anesthetic complications: no      Cardiovascular status: blood pressure returned to baseline  Respiratory status: unassisted, spontaneous ventilation and room air  Hydration status: euvolemic            Vitals Value Taken Time   /67 01/12/22 1300   Temp 36.6 °C (97.9 °F) 01/12/22 1300   Pulse 70 01/12/22 1300   Resp 16 01/12/22 1300   SpO2 96 % 01/12/22 1300         Event Time   Out of Recovery 12:57:00         Pain/Alex Score: Pain Rating Prior to Med Admin: 0 (1/12/2022 11:36 AM)  Pain Rating Post Med Admin: 0 (1/12/2022 11:36 AM)  Alex Score: 10 (1/12/2022  1:02 PM)

## 2022-01-12 NOTE — ASSESSMENT & PLAN NOTE
Patient here for PVI RFA with Dr. Shell for atrial fibrillation    Anticoagulation: apixiban  EF (most recent): 63%    The risks, benefits and alternatives of the procedure were explained to the patient, patient's family and/or surrogate decision maker. Risks include (but not limited to) bleeding, hematoma, infection, pain, vascular damage, damage to structures surrounding the vasculature, myocardial damage [perforation, valvular damage], cardiac tamponade, phrenic nerve damage, pulmonary vein stenosis, AE fistula, CVA, MI, and death. All questions were answered. Patient is understanding of these risks, and would like to proceed. Consents signed.

## 2022-01-12 NOTE — ANESTHESIA PROCEDURE NOTES
Intubation    Date/Time: 1/12/2022 8:15 AM  Performed by: Ledy Joseph CRNA  Authorized by: RONNY French MD     Intubation:     Induction:  Inhalational - mask    Intubated:  Postinduction    Mask Ventilation:  Easy mask    Attempts:  1    Attempted By:  CRNA    Method of Intubation:  Direct    Blade:  Augustine 2    Laryngeal View Grade: Grade I - full view of cords      Difficult Airway Encountered?: No      Complications:  None    Airway Device:  Oral endotracheal tube    Airway Device Size:  8.0    Style/Cuff Inflation:  Cuffed    Inflation Amount (mL):  8    Tube secured:  23    Secured at:  The lips    Placement Verified By:  Capnometry    Complicating Factors:  Anterior larynx    Findings Post-Intubation:  BS equal bilateral and atraumatic/condition of teeth unchanged

## 2022-01-12 NOTE — PROGRESS NOTES
Received pt from [facility]. AAOX4. groin sites D/I, PPPX4. Taking ice chips for scratchy throat. Wife updated as to pts location. NAD

## 2022-01-12 NOTE — Clinical Note
Anesthesia Pre-Procedure Evaluation    Patient: Flor Griffin   MRN: [id number] : 1953        Preoperative Diagnosis: Malignant neoplasm of overlapping sites of right breast in female, estrogen receptor positive (H) [C50.811, Z17.0]   Procedure : Procedure(s):  RIGHT SIMPLE MASTECTOMY     Past Medical History:   Diagnosis Date     Basal cell cancer     right cheek     Gastroesophageal reflux disease      History of blood transfusion     blue baby     Hyperlipidaemia       Past Surgical History:   Procedure Laterality Date     BIOPSY MASS NECK Right 2017    Procedure: BIOPSY MASS NECK;  Excisional Biopsy Right Neck Lymph node;  Surgeon: Waylon Corral MD;  Location: [facility]     COLONOSCOPY       IR CHEST PORT PLACEMENT > 5 YRS OF AGE  2021     IR PORT REMOVAL LEFT  2019     MOHS MICROGRAPHIC PROCEDURE  ~    right cheek; BCC     MOHS MICROGRAPHIC PROCEDURE  10/31/2016    Dr. Nicholas BCC      Allergies   Allergen Reactions     Pcn [Penicillins] Hives      Social History     Tobacco Use     Smoking status: Never Smoker     Smokeless tobacco: Never Used   Substance Use Topics     Alcohol use: Yes     Alcohol/week: 0.0 standard drinks     Comment: 4 times a week, 2 drinks      Wt Readings from Last 1 Encounters:   21 78.5 kg (173 lb)        Anesthesia Evaluation            ROS/MED HX  ENT/Pulmonary:       Neurologic:       Cardiovascular:     (+) Dyslipidemia -----Taking blood thinners Pt has received instructions: Instructions Given to patient: hold xarelto two days prior to surgery.     METS/Exercise Tolerance:     Hematologic: Comments: neutropenia, anemia    (+) History of blood clots, pt is anticoagulated, anemia,     Musculoskeletal:       GI/Hepatic:     (+) GERD,     Renal/Genitourinary:       Endo:       Psychiatric/Substance Use:       Infectious Disease:       Malignancy: Comment: Stage IV breast cancer  (+) Malignancy, History of Breast and Skin.Breast CA Active status post  The sheath was inserted into the right femoral vein.  Chemo and Radiation.  Skin CA Remission status post Surgery.        Other:               OUTSIDE LABS:  CBC:   Lab Results   Component Value Date    WBC 4.1 05/18/2021    WBC 4.6 05/12/2021    HGB 10.8 (L) 05/18/2021    HGB 11.5 (L) 05/12/2021    HCT 33.1 (L) 05/18/2021    HCT 35.7 05/12/2021     05/18/2021     05/12/2021     BMP:   Lab Results   Component Value Date     05/27/2021     05/18/2021    POTASSIUM 3.6 05/27/2021    POTASSIUM 3.2 (L) 05/18/2021    CHLORIDE 107 05/27/2021    CHLORIDE 104 05/18/2021    CO2 28 05/27/2021    CO2 30 05/18/2021    BUN 13 05/27/2021    BUN 9 05/18/2021    CR 0.76 05/27/2021    CR 0.81 05/18/2021     (H) 05/27/2021     (H) 05/18/2021     COAGS:   Lab Results   Component Value Date    PTT 21 (L) 04/24/2021    INR 0.90 04/24/2021     POC:   Lab Results   Component Value Date     (H) 05/02/2018     HEPATIC:   Lab Results   Component Value Date    ALBUMIN 3.4 05/18/2021    PROTTOTAL 6.1 (L) 05/18/2021    ALT 58 (H) 05/18/2021    AST 38 05/18/2021    ALKPHOS 81 05/18/2021    BILITOTAL 0.8 05/18/2021     OTHER:   Lab Results   Component Value Date    LACT 1.3 04/24/2021    EDILSON 9.0 05/27/2021    MAG 1.8 05/18/2021    TSH 4.57 (H) 07/01/2019    T4 0.81 07/01/2019       Anesthesia Plan    ASA Status:  3   NPO Status:  NPO Appropriate    Anesthesia Type: General.     - Airway: ETT   Induction: Intravenous.   Maintenance: Balanced.        Consents    Anesthesia Plan(s) and associated risks, benefits, and realistic alternatives discussed. Questions answered and patient/representative(s) expressed understanding.     - Discussed with:  Patient      - Extended Intubation/Ventilatory Support Discussed: No.      - Patient is DNR/DNI Status: No    Use of blood products discussed: Yes.     - Discussed with: Patient.     - Consented: consented to blood products            Reason for refusal: other.     Postoperative Care    Pain management: IV  analgesics, Multi-modal analgesia.   PONV prophylaxis: Ondansetron (or other 5HT-3), Dexamethasone or Solumedrol     Comments:                Richie Ruiz, DO

## 2022-01-12 NOTE — Clinical Note
A percutaneous stick to the left femoral and right femoral vein was performed. Ultrasound guidance was used to obtain access.

## 2022-01-12 NOTE — PROCEDURES
: Scotty Shell MD  Date of procedure: 01/12/2022    Post-operative Diagnosis: AF    Procedure Performed: Pulmonary vein isolation of all 4 pulmonary veins via radiofrequency ablation & CTI ablation    Description of Procedure: The patient was brought to the EP lab in the fasting state. Prepped and draped in sterile fashion. Safety timeout was performed. Sedation administered by anesthesia staff. Ultrasound guided venous access of the bilateral femoral veins was performed. ICE and CS catheters placed via left femoral vein access. Long sheaths via right femoral venous access.Halo catheter placed via right femoral vein. RFA to the CTI with confirmation of bidirectional block. Heparin bolus and continuous infusion per protocol. Two transseptal punctures performed using combination of fluoroscopic and ICE guidance. Map created. RFA to isolate all pulmonary veins. ICE confirmed no significant pericardial effusion.     EBL: <10 mL    Specimens Removed: None  Complications: no immediate    Recommendations:   Bedrest x 6 hrs -- ends at 5 PM   ECG on arrival to recovery   Patient to resume OAC this evening. If bleeding or hematoma formation, please notify EP service before holding OAC   Medication changes: lasix PRN, and carafate 1 g QID at discharge.    Plans for discharge following bedrest if patient tolerating PO intake, voiding, and ambulatory without evidence of complications    Mateus Parra MD  [id number]

## 2022-01-12 NOTE — Clinical Note
The groin was prepped. The site was prepped with ChloraPrep. The site was clipped. The patient was draped. The patient was positioned supine. The patient was secured using safety straps and with ulnar pads.

## 2022-01-12 NOTE — SUBJECTIVE & OBJECTIVE
Past Medical History:   Diagnosis Date    Anticoagulant long-term use     Hyperlipidemia     Kidney stones        Past Surgical History:   Procedure Laterality Date    SHOULDER SURGERY      TREATMENT OF CARDIAC ARRHYTHMIA N/A 8/6/2020    Procedure: CARDIOVERSION;  Surgeon: Scotty Shell MD;  Location: [facility] LAB;  Service: Cardiology;  Laterality: N/A;  AF, RADHIKA, DCCV, MAC, [place], 3 Prep       Review of patient's allergies indicates:  No Known Allergies    Current Facility-Administered Medications on File Prior to Encounter   Medication    sodium chloride 0.9% bolus 1,000 mL     Current Outpatient Medications on File Prior to Encounter   Medication Sig    apixaban (ELIQUIS) 5 mg Tab Take 1 tablet (5 mg total) by mouth 2 (two) times a day.    flecainide (TAMBOCOR) 100 MG Tab TAKE 1 TABLET(100 MG) BY MOUTH EVERY 12 HOURS    metoprolol succinate (TOPROL-XL) 25 MG 24 hr tablet TAKE 1/2 TABLET(12.5 MG) BY MOUTH EVERY DAY     Family History     Problem Relation (Age of Onset)    Arthritis Sister, Sister    Heart disease Father    Kidney failure Father    Liver disease Sister        Tobacco Use    Smoking status: Never Smoker    Smokeless tobacco: Never Used   Substance and Sexual Activity    Alcohol use: Yes     Alcohol/week: 2.0 standard drinks     Types: 2 Cans of beer per week     Comment: minimal    Drug use: Not on file    Sexual activity: Not on file     Review of Systems   All other systems reviewed and are negative.    Objective:     Vital Signs (Most Recent):    Vital Signs (24h Range):  BP: ()/()   Arterial Line BP: ()/()      There is no height or weight on file to calculate BMI.    Physical Exam   General: NAD. AAO  HENT: EOMI  Neck: supple. No JVD  CV: RRR. Normal S1/S2. No gallops, rubs, or murmurs. 2+ radial pulses  Resp: CTAB. No increased work of breathing  Ext: warm. No edema.      Significant Labs: All pertinent lab results from the last 24 hours have been reviewed.    Significant Imaging:  Reviewed

## 2022-01-12 NOTE — TRANSFER OF CARE
Anesthesia Transfer of Care Note    Patient: Jhonatan Sofia    Procedure(s) Performed: Procedure(s) (LRB):  Ablation atrial fibrillation (N/A)  Ablation, Atrial Flutter, Typical (N/A)    Patient location: PACU    Anesthesia Type: general    Transport from OR: Transported from OR on 6-10 L/min O2 by face mask with adequate spontaneous ventilation    Post pain: adequate analgesia    Post assessment: no apparent anesthetic complications    Post vital signs: stable    Level of consciousness: sedated    Nausea/Vomiting: no nausea/vomiting    Complications: none    Transfer of care protocol was followed      Last vitals:   Visit Vitals  /85 (BP Location: Left arm, Patient Position: Lying)   Pulse 60   Temp 36.6 °C (97.9 °F) (Temporal)   Resp 16   Ht 6' (1.829 m)   Wt 102.1 kg (225 lb)   SpO2 97%   BMI 30.52 kg/m²

## 2022-01-12 NOTE — HPI
[place] is here today for a PVI RFA with Dr. Shell for treatment of atrial fibrillation.    Problems  PAF, CHADS-VASc=1  HTN    Previous DCCV and/or procedural history:  8/20 RADHIKA/DCCV    Today, denies CP, SOB, orthopnea, PND.     ECG: pending  Anticoagulation: rivaroxaban with suspected 100% compliance however he is not sure. Last dose yesterday evening.  Most recent EF: 63%    Outpatient EP medications:  - Flecainide 100 mg BID  - Toprol XL 25 mg daily  - Eliquis 5 mg PO BID

## 2022-01-12 NOTE — HOSPITAL COURSE
Successful PVI & CTI RFAs without complications. Started on carafate 1 g QID and lasix 20 mg PO PRN fluid retention at discharge. HD stable and sating well. Given post-procedure instruction. Continue AAD therapy otherwise at this time.

## 2022-01-12 NOTE — DISCHARGE SUMMARY
[facility] Cardiac [facility]  Cardiac Electrophysiology  Discharge Summary      Patient Name: Jhonatan Sofia  MRN: [id number]  Admission Date: 1/12/2022  Hospital Length of Stay: 0 days  Discharge Date and Time:  01/12/2022 4:43 PM  Attending Physician: Scotty Shell MD    Discharging Provider: Jax Parra MD  Primary Care Physician: Jose Sanchez MD      Hospital Course:  Successful PVI & CTI RFAs without complications. Started on carafate 1 g QID and lasix 20 mg PO PRN fluid retention at discharge which were sent to [facility]'s per request. HD stable and sating well. Given post-procedure instruction. Continue AAD therapy otherwise at this time.        HPI:   [place] is here today for a PVI RFA with Dr. Shell for treatment of atrial fibrillation.    Problems  PAF, CHADS-VASc=1  HTN    Previous DCCV and/or procedural history:   8/20 RADHIKA/DCCV    Today, denies CP, SOB, orthopnea, PND.     ECG: pending  Anticoagulation: rivaroxaban with suspected 100% compliance however he is not sure. Last dose yesterday evening.  Most recent EF: 63%    Outpatient EP medications:  - Flecainide 100 mg BID  - Toprol XL 25 mg daily  - Eliquis 5 mg PO BID      Procedure(s) (LRB):  Ablation atrial fibrillation (N/A)  Ablation, Atrial Flutter, Typical (N/A)     Indwelling Lines/Drains at time of discharge:  Lines/Drains/Airways     None                 Goals of Care Treatment Preferences:         Consults:     Significant Diagnostic Studies: Labs: All labs within the past 24 hours have been reviewed    Pending Diagnostic Studies:     Procedure Component Value Units Date/Time    Transesophageal echo (RADHIKA) [[id number]]     Order Status: Sent Lab Status: No result           Final Active Diagnoses:    Diagnosis Date Noted POA    Paroxysmal atrial fibrillation [I48.0] 07/07/2020 Yes      Problems Resolved During this Admission:     No new Assessment & Plan notes have been filed under this hospital service since the last note was  generated.  Service: Arrhythmia      Discharged Condition: stable    Disposition:     Follow Up:    Patient Instructions:   No discharge procedures on file.  Medications:  Reconciled Home Medications:      Medication List      START taking these medications    furosemide 20 MG tablet  Commonly known as: LASIX  Take 1 tablet (20 mg total) by mouth daily as needed (fluid retention).     sucralfate 1 gram tablet  Commonly known as: CARAFATE  Take 1 tablet (1 g total) by mouth 4 (four) times daily.        CONTINUE taking these medications    apixaban 5 mg Tab  Commonly known as: ELIQUIS  Take 1 tablet (5 mg total) by mouth 2 (two) times a day.     flecainide 100 MG Tab  Commonly known as: TAMBOCOR  TAKE 1 TABLET(100 MG) BY MOUTH EVERY 12 HOURS     metoprolol succinate 25 MG 24 hr tablet  Commonly known as: TOPROL-XL  TAKE 1/2 TABLET(12.5 MG) BY MOUTH EVERY DAY     pantoprazole 40 MG tablet  Commonly known as: PROTONIX  TAKE 1 TABLET(40 MG) BY MOUTH EVERY DAY            Time spent on the discharge of patient: 15 minutes    Jax Parra MD  Cardiac Electrophysiology  [facility] Short Stay Cardiac Unit

## 2022-01-12 NOTE — ANESTHESIA PREPROCEDURE EVALUATION
01/12/2022  Jhonatan Sofia is a 56 y.o., male.  [facility]  Anesthesia Pre-Operative Evaluation       Patient Name: Jhonatan Sofia  YOB: 1965  MRN: [id number]  [facility]: [id number]      Code Status: No Order   Date of Procedure: 1/12/2022  Anesthesia: General Procedure: Procedure(s) (LRB):  Ablation atrial fibrillation (N/A)  Ablation, Atrial Flutter, Typical (N/A)  Transesophageal echo (RADHIKA) intra-procedure log documentation (N/A)  Pre-Operative Diagnosis: Persistent atrial fibrillation [I48.19]  Typical atrial flutter [I48.3]  Proceduralist: Surgeon(s) and Role:  Panel 1:     * Scotty Shell MD - Primary  Panel 2:     * [facility] Diagnostic Provider - Primary        SUBJECTIVE:   Jhonatan Sofia is a 56 y.o. male who  has a past medical history of Anticoagulant long-term use, Atrial fibrillation, Hyperlipidemia, Hypertension, and Kidney stones. [place] is here today for a PVI RFA with Dr. Shell for treatment of atrial fibrillation.    Problems  PAF, CHADS-VASc=1  HTN    Previous DCCV and/or procedural history:   8/20 RADHIKA/DCCV    Today, denies CP, SOB, orthopnea, PND.     ECG: pending  Anticoagulation: rivaroxaban with suspected 100% compliance however he is not sure. Last dose yesterday evening.  Most recent EF: 63%    Outpatient EP medications:  - Flecainide 100 mg BID  - Toprol XL 25 mg daily  - Eliquis 5 mg PO BID      he has a current medication list which includes the following long-term medication(s): flecainide, metoprolol succinate, and pantoprazole.   ALLERGIES:   Review of patient's allergies indicates:  No Known Allergies  LDA:      Lines/Drains/Airways     Peripheral Intravenous Line                 Peripheral IV - Single Lumen 01/12/22 0641 20 G Right Antecubital <1 day         Peripheral IV - Single Lumen 01/12/22 0649 20 G Left Antecubital <1 day                Anesthesia Evaluation      Airway   Dental      Pulmonary    Cardiovascular   (+) hypertension,     Neuro/Psych      GI/Hepatic/Renal      Endo/Other    Abdominal                         History:     Active Hospital Problems    Diagnosis  POA    Paroxysmal atrial fibrillation [I48.0]  Yes      Resolved Hospital Problems   No resolved problems to display.     Surgical History:    has a past surgical history that includes Shoulder surgery; Treatment of cardiac arrhythmia (N/A, 8/6/2020); and Cardioversion.   Social History:     reports that he has never smoked. He has never used smokeless tobacco. He reports current alcohol use. He reports that he does not use drugs.     OBJECTIVE:     Vital Signs (Most Recent):  Temp: 36.6 °C (97.9 °F) (01/12/22 0628)  Pulse: 60 (01/12/22 0629)  Resp: 16 (01/12/22 0628)  BP: 135/85 (01/12/22 0629)  SpO2: 97 % (01/12/22 0628) Vital Signs Range (Last 24H):  Temp:  [36.6 °C (97.9 °F)]   Pulse:  [58-60]   Resp:  [16]   BP: (134-135)/(85-91)   SpO2:  [97 %]        Body mass index is 30.52 kg/m².   Wt Readings from Last 4 Encounters:   01/12/22 102.1 kg (225 lb)   11/29/21 110.6 kg (243 lb 13.3 oz)   11/05/21 106.6 kg (235 lb)   05/12/21 105.7 kg (233 lb)     Significant Labs:  Lab Results   Component Value Date    WBC 7.57 01/05/2022    HGB 13.5 (L) 01/05/2022    HCT 41.3 01/05/2022     01/05/2022     01/05/2022    K 3.6 01/05/2022     01/05/2022    CREATININE 1.1 01/05/2022    BUN 21 (H) 01/05/2022    CO2 29 01/05/2022    GLU 83 01/05/2022    CALCIUM 9.0 01/05/2022    ALKPHOS 60 11/05/2021    ALT 62 (H) 11/05/2021    AST 30 11/05/2021    ALBUMIN 4.0 11/05/2021    INR 0.9 01/05/2022    APTT 27.9 01/05/2022    HGBA1C 5.7 06/20/2016     No LMP for male patient.  No results found for this or any previous visit (from the past 72 hour(s)).    EKG:   Results for orders placed or performed during the hospital encounter of 08/20/20   EKG 12-LEAD    Collection Time:  08/20/20 12:48 PM    Narrative    Test Reason : I48.91,    Vent. Rate : 045 BPM     Atrial Rate : 045 BPM     P-R Int : 242 ms          QRS Dur : 090 ms      QT Int : 468 ms       P-R-T Axes : 022 055 039 degrees     QTc Int : 404 ms    Sinus bradycardia with 1st degree A-V block  Otherwise normal ECG  When compared with ECG of 13-AUG-2020 16:43,  Sinus rhythm has replaced Atrial fibrillation  Confirmed by Kristian FITZGERALD MD ([id number]) on 8/20/2020 5:39:57 PM    Referred By: CHELA MILES           Confirmed By:Kristian FITZGERALD MD       TTE:  Results for orders placed or performed during the hospital encounter of 05/12/21   Echo Color Flow Doppler? Yes   Result Value Ref Range    Narrative    · The left ventricle is normal in size with normal systolic function.  · The estimated ejection fraction is 63%.  · Normal left ventricular diastolic function.  · Severe left atrial enlargement.  · Normal right ventricular size with normal right ventricular systolic   function.  · Moderate right atrial enlargement.  · Mild mitral regurgitation.  · Mild tricuspid regurgitation.  · Normal central venous pressure (3 mmHg).  · The estimated PA systolic pressure is 28 mmHg.  · Trivial pericardial effusion. Outside the RA.        EF   Date Value Ref Range Status   05/12/2021 63 % Final      No results found for this or any previous visit.  RADHIKA:  Results for orders placed or performed during the hospital encounter of 08/06/20   Transesophageal echo (RADHIKA)   Result Value Ref Range    BSA 2.3 m2    STJ 3.90 cm    Ascending aorta 3.10 cm    Narrative    · RADHIKA to evaluate SURINDER prior DCCV for atrial fibrillation  · No thrombus is present in the appendage.  · Low normal left ventricular systolic function. The estimated ejection   fraction is 50%.  · Mild right ventricular enlargement.  · Low normal right ventricular systolic function.  · Mild mitral regurgitation.          ASSESSMENT/PLAN:       Anesthesia Evaluation    I have reviewed the Patient Summary  Reports.    I have reviewed the Nursing Notes.    I have reviewed the Medications.     Review of Systems  Cardiovascular:   Hypertension           Anesthesia Plan  Type of Anesthesia, risks & benefits discussed:  Anesthesia Type:  general    Patient's Preference: same   Plan Factors:          Intra-op Monitoring Plan: standard ASA monitors and arterial line  Intra-op Monitoring Plan Comments:   Post Op Pain Control Plan: per primary service following discharge from PACU  Post Op Pain Control Plan Comments:     Induction:   IV  Beta Blocker:  Patient is on a Beta-Blocker and has received one dose within the past 24 hours (No further documentation required).       Informed Consent: Patient understands risks and agrees with Anesthesia plan.  Questions answered. Anesthesia consent signed with patient.  ASA Score: 3     Day of Surgery Review of History & Physical:    H&P update referred to the surgeon.         Ready For Surgery From Anesthesia Perspective.

## 2022-01-12 NOTE — H&P
[facility] Short [facility] Cardiac [facility]  Cardiac Electrophysiology  History and Physical     Admission Date: 1/12/2022  Code Status: No Order   Attending Provider: Scotty Shell MD   Principal Problem:<principal problem not specified>    Subjective:     Chief Complaint:  AF    HPI:  [place] is here today for a PVI RFA with Dr. Shell for treatment of atrial fibrillation.    Problems  PAF, CHADS-VASc=1  HTN    Previous DCCV and/or procedural history:   8/20 RADHIKA/DCCV    Today, denies CP, SOB, orthopnea, PND.     ECG: sinus bradycardia with 1st degree AVB  Anticoagulation: rivaroxaban with suspected 100% compliance however he is not sure. Last dose yesterday evening.  Most recent EF: 63%    Outpatient EP medications:  - Flecainide 100 mg BID  - Toprol XL 25 mg daily  - Eliquis 5 mg PO BID      Past Medical History:   Diagnosis Date    Anticoagulant long-term use     Hyperlipidemia     Kidney stones        Past Surgical History:   Procedure Laterality Date    SHOULDER SURGERY      TREATMENT OF CARDIAC ARRHYTHMIA N/A 8/6/2020    Procedure: CARDIOVERSION;  Surgeon: Scotty Shell MD;  Location: [facility] EP LAB;  Service: Cardiology;  Laterality: N/A;  AF, RADHIKA, DCCV, MAC, [place], 3 Prep       Review of patient's allergies indicates:  No Known Allergies    Current Facility-Administered Medications on File Prior to Encounter   Medication    sodium chloride 0.9% bolus 1,000 mL     Current Outpatient Medications on File Prior to Encounter   Medication Sig    apixaban (ELIQUIS) 5 mg Tab Take 1 tablet (5 mg total) by mouth 2 (two) times a day.    flecainide (TAMBOCOR) 100 MG Tab TAKE 1 TABLET(100 MG) BY MOUTH EVERY 12 HOURS    metoprolol succinate (TOPROL-XL) 25 MG 24 hr tablet TAKE 1/2 TABLET(12.5 MG) BY MOUTH EVERY DAY     Family History     Problem Relation (Age of Onset)    Arthritis Sister, Sister    Heart disease Father    Kidney failure Father    Liver disease Sister        Tobacco Use    Smoking status: Never Smoker     Smokeless tobacco: Never Used   Substance and Sexual Activity    Alcohol use: Yes     Alcohol/week: 2.0 standard drinks     Types: 2 Cans of beer per week     Comment: minimal    Drug use: Not on file    Sexual activity: Not on file     Review of Systems   All other systems reviewed and are negative.    Objective:     Vital Signs (Most Recent):    Vital Signs (24h Range):  BP: ()/()   Arterial Line BP: ()/()      There is no height or weight on file to calculate BMI.    Physical Exam   General: NAD. AAO  HENT: EOMI  Neck: supple. No JVD  CV: RRR. Normal S1/S2. No gallops, rubs, or murmurs. 2+ radial pulses  Resp: CTAB. No increased work of breathing  Ext: warm. No edema.      Significant Labs: All pertinent lab results from the last 24 hours have been reviewed.    Significant Imaging: Reviewed    Assessment and Plan:     Paroxysmal atrial fibrillation  Patient here for PVI RFA with Dr. Shell for atrial fibrillation    Anticoagulation: apixiban  EF (most recent): 63%    The risks, benefits and alternatives of the procedure were explained to the patient, patient's family and/or surrogate decision maker. Risks include (but not limited to) bleeding, hematoma, infection, pain, vascular damage, damage to structures surrounding the vasculature, myocardial damage [perforation, valvular damage], cardiac tamponade, phrenic nerve damage, pulmonary vein stenosis, AE fistula, CVA, MI, and death. All questions were answered. Patient is understanding of these risks, and would like to proceed. Consents signed.    Jax Parra MD  Cardiac Electrophysiology  [facility] - Short Stay Cardiac Unit

## 2022-01-12 NOTE — ANESTHESIA PROCEDURE NOTES
Arterial    Diagnosis: afib    Patient location during procedure: done in OR  Procedure start time: 1/12/2022 8:12 AM  Timeout: 1/12/2022 8:12 AM  Procedure end time: 1/12/2022 8:14 AM    Staffing  Authorizing Provider: RONNY French MD  Performing Provider: RONNY French MD    Anesthesiologist was present at the time of the procedure.    Preanesthetic Checklist  Completed: patient identified, IV checked, site marked, risks and benefits discussed, surgical consent, monitors and equipment checked, pre-op evaluation, timeout performed and anesthesia consent givenArterial  Skin Prep: chlorhexidine gluconate  Local Infiltration: none  Orientation: right  Location: radial    Catheter Size: 20 G  Catheter placement by Anatomical landmarks. Heme positive aspiration all ports.Insertion Attempts: 1  Assessment  Dressing: secured with tape and tegaderm

## 2022-01-12 NOTE — PROGRESS NOTES
Sutures removed at linus groin sites. Sites soft, no bleeding or oozing, no hematoma. Dsg with gauze and tegaderm placed over site.

## 2022-01-13 ENCOUNTER — PATIENT MESSAGE (OUTPATIENT)
Dept: ELECTROPHYSIOLOGY | Facility: CLINIC | Age: 57
End: 2022-01-13
Payer: COMMERCIAL

## 2022-01-13 DIAGNOSIS — I49.8 OTHER SPECIFIED CARDIAC ARRHYTHMIAS: Primary | ICD-10-CM

## 2022-01-13 DIAGNOSIS — I48.19 PERSISTENT ATRIAL FIBRILLATION: ICD-10-CM

## 2022-01-13 RX ORDER — FLECAINIDE ACETATE 100 MG/1
TABLET ORAL
Qty: 60 TABLET | Refills: 11 | Status: SHIPPED | OUTPATIENT
Start: 2022-01-13 | End: 2022-03-16

## 2022-01-13 NOTE — PROGRESS NOTES
Patient discharged per MD orders. Instructions given on medications, wound care, activity, signs of infection, when to call MD, and follow up appointments. Pt verbalized understanding.  Patient AAOx4, VSS, no c/o pain or discomfort at this time, ambulating per baseline & tolerating food and fluids. Telemetry and PIV removed. Patient left unit via wheelchair with transport and family.

## 2022-01-14 ENCOUNTER — TELEPHONE (OUTPATIENT)
Dept: ELECTROPHYSIOLOGY | Facility: CLINIC | Age: 57
End: 2022-01-14
Payer: COMMERCIAL

## 2022-01-14 NOTE — TELEPHONE ENCOUNTER
Attempted to contact patient to review post op instructions, expectations, and any concerns he may have.   Call back number provided.

## 2022-01-20 LAB
POC ACTIVATED CLOTTING TIME K: 142 SEC (ref 74–137)
POC ACTIVATED CLOTTING TIME K: 184 SEC (ref 74–137)
POC ACTIVATED CLOTTING TIME K: 285 SEC (ref 74–137)
POC ACTIVATED CLOTTING TIME K: 333 SEC (ref 74–137)
POC ACTIVATED CLOTTING TIME K: 368 SEC (ref 74–137)
POC ACTIVATED CLOTTING TIME K: 398 SEC (ref 74–137)
SAMPLE: ABNORMAL

## 2022-01-25 ENCOUNTER — PATIENT MESSAGE (OUTPATIENT)
Dept: ELECTROPHYSIOLOGY | Facility: CLINIC | Age: 57
End: 2022-01-25
Payer: COMMERCIAL

## 2022-01-25 NOTE — TELEPHONE ENCOUNTER
Spoke with patient, who states that he only has enough medication to last for today. Patient advised that I have discussed his message to Dr Shell and Dr Shell states that it is OK to discontinue Eliquis and start Xarelto 20 mg daily. Patient instructed that the Xarelto should be taken every evening with dinner. Patient verbalizes understanding .

## 2022-01-26 ENCOUNTER — PATIENT MESSAGE (OUTPATIENT)
Dept: ELECTROPHYSIOLOGY | Facility: CLINIC | Age: 57
End: 2022-01-26
Payer: COMMERCIAL

## 2022-02-07 ENCOUNTER — TELEPHONE (OUTPATIENT)
Dept: ELECTROPHYSIOLOGY | Facility: CLINIC | Age: 57
End: 2022-02-07
Payer: COMMERCIAL

## 2022-02-07 NOTE — TELEPHONE ENCOUNTER
Patient's medication was changed from Eliquis to Xarelto, the preferred medication for patient's insurance. Prior auth no longer needed.

## 2022-02-07 NOTE — TELEPHONE ENCOUNTER
----- Message from Lily Tillman sent at 2/7/2022  1:27 PM CST -----  Regarding: Prior Auth f/u  Nawaf [id number] with Cover my meds says the prior auth that was sent out on 1/25/22 was denied. He sent a renewal out today and he says since it was denied the pt plan allows a prior auth with additional info on a resubmission. Please use this resubmission key [id number] when calling.    Thanks

## 2022-03-08 NOTE — PROGRESS NOTES
Mr. Sofia is a patient of Dr. Shell and was last seen in clinic 11/29/2021.      Subjective:   Patient ID:  Jhonatan Sofia is a 57 y.o. male who presents for follow-up of Atrial Fibrillation  .     HPI:    Mr. Sofia is a 57 y.o. male with HTN, AF here for follow up after ablation.    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 He works at [employer] and gets yearly on-site physical exams with electrocardiograms. He reports they have been normal until this past March of 2020 when he was diagnosed with atrial fibrillation. He had no obvious symptoms however his wife and daughter report some increased fatigue that they have noted. He was seen by Dr. Sanchez who referred him to general cardiology. Low dose metoprolol and eliquis were prescribed and he was referred for further evaluation. He has yet to start eliquis.    He exercises regularly with heart rate monitoring and notes at times he sustains his heart rate in the 180s-200 bpm range. He does not use exercise supplements, does not drink alcohol, and wife reports no symptoms consistent with sleep apnea. He had an exercise stress echocardiogram in 2017 which noted no ischemia and showed a structurally normal left ventricle with mild left atrial enlargement (LVEF 55%). A recent echocardiogram noted a LVEF of 50% with a severely enlarged left atrium. A recent TSH lab test indicated a euthyroid state.    Available electrocardiograms in Epic which show normal sinus rhythm until an ECG dated 5/14/2020 which showed atrial fibrillation with controlled ventricular response.    His [id number] score is 1 and we discussed that anticoagulation long-term is a risk-benefit shared decision analysis. I also discussed the goal to reduce symptomatic arrhythmic episodes by pharmacologic and/or procedural methods and utilizing a rhythm versus a rate control strategy. He may have  latent symptoms per his family (fatigue). Discussed regardless at his age and activity level as well as this being the first documented event I strongly recommend an attempt at restoration of sinus rhythm to assess symptomatic response. He understood and agreed. Discussed need to take eliquis prior and 4 weeks after cardioversion. He can then decide on whether he wants to continue it long-term. If needed would use flecainide for rhythm control. Discussed risk atrial fibrillation in high-endurance athletes and advised to limit his exercise effort to maintain acceptable age-related exercise heart rate.     8/6/2020: Cardioversion was successfully performed with restoration of normal sinus rhythm.    Spoke with Mr. Sofia regarding his recurrent AF after his DCCV. He reports he felt great for ~5 days after DCCV and then went back into AF. Discussed starting an anti-arrhythmic drug and then repeating a DCCV. He stated he would like to do that. 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 Will start flecainide 100mg bid Sunday and have my nurse schedule an outpatient DCCV Wednesday or Thursday. He should continue eliquis and metoprolol. He has not missed any eliquis and thus will defer RADHIKA.    10/6/2020: He underwent cardioversion 8/6 with symptom improvement. Continued to have AF afterward and was placed on flecainide. In SR when presented for subsequent DCCV which was aborted. He is here for follow up.   He continues to feel better in SR. Has had 2 breakthrough episodes when he delayed his medications. One lasting several hours. Given his young age and symptomatic breakthrough AF despite flecainide, he would likely be a good candidate for PVI. (UPDATE: Dr. Shell confirms good PVI candidate)  Risks, benefits, and alternatives discussed with patient. He is interested in proceeding but cannot fit it into his schedule until after the new year. Will have patient RTC in 3 mo to confirm. In  "interim, continue flecainide. He has chronic bradycardia and is asymptomatic, but skips his metoprolol some days if his BP is low. Will reduce to 12.5mg toprol. He remains on eliquis for CVA prophylaxis (CHADSVASc 1).    4/14/2021: Overall he feels he is well controlled on flecainide. Only identifies breakthrough when he misses or delays meds. Does notice fast HR while exercising - likely SR given gradual trend on monitor. He would prefer to continue current regimen for now if it is safe. EKG with stable intervals. Update echo to confirm stability of heart function. Plan for PVI if EF is down or he identifies consistent AF breakthrough despite flecainide. CHADSVASc 1 and he remains on eliquis.    11/29/2021: Pt is out of rhythm today despite flecainide. He has had multiple AF breakthrough episodes. No overt symptoms but he does have a history of fatigue with AF and given his young age, rhythm control is recommended. PVI previously recommended by Dr. Shell. We discussed risks, benefits, and alternatives to PVI. Pt agrees to proceed. In interim will obtain Holter to determine whether his arrhythmia is paroxysmal or persistent. If persistent - will stop flecainide now. CHADSVASC 1 on eliquis.    Update (03/16/2022):    1/12/2022:  · Successful CTI ablation.  · Successful pulmonary vein RF ablation.    Today he says he has been feeling well. Notices an increase in energy. Never felt palpitations. Still "irreg" on HR cuff sometimes. Mr. Sofia reports no chest pain with exertion or at rest, palpitations, SOB, JACOME, dizziness, or syncope.    He is currently taking xarelto 20mg daily for stroke prophylaxis and denies significant bleeding episodes. He is currently being treated with flecainide 100mg BID for rhythm control and toprol 12.5mg daily for HR control. Kidney function is stable, with a creatinine of 1.1 on 1/5/2022.    I have personally reviewed the patient's EKG today, which shows sinus bradycardia at 55bpm. NE " interval is 206. QRS is 92. QT is 430.    Relevant Cardiac Test Results:    2D Echo (5/12/2021):  · The left ventricle is normal in size with normal systolic function.  · The estimated ejection fraction is 63%.  · Normal left ventricular diastolic function.  · Severe left atrial enlargement.  · Normal right ventricular size with normal right ventricular systolic function.  · Moderate right atrial enlargement.  · Mild mitral regurgitation.  · Mild tricuspid regurgitation.  · Normal central venous pressure (3 mmHg).  · The estimated PA systolic pressure is 28 mmHg.  · Trivial pericardial effusion. Outside the RA.    Current Outpatient Medications   Medication Sig    flecainide (TAMBOCOR) 100 MG Tab TAKE 1 TABLET(100 MG) BY MOUTH EVERY 12 HOURS    furosemide (LASIX) 20 MG tablet Take 1 tablet (20 mg total) by mouth daily as needed (fluid retention).    metoprolol succinate (TOPROL-XL) 25 MG 24 hr tablet TAKE 1/2 TABLET(12.5 MG) BY MOUTH EVERY DAY    pantoprazole (PROTONIX) 40 MG tablet TAKE 1 TABLET(40 MG) BY MOUTH EVERY DAY    rivaroxaban (XARELTO) 20 mg Tab Take 1 tablet (20 mg total) by mouth every evening. (Take with dinner)     No current facility-administered medications for this visit.     Facility-Administered Medications Ordered in Other Visits   Medication    sodium chloride 0.9% bolus 1,000 mL    sodium chloride 0.9% bolus 1,000 mL       Review of Systems   Constitutional: Negative for malaise/fatigue.   Cardiovascular: Negative for chest pain, dyspnea on exertion, irregular heartbeat, leg swelling and palpitations.   Respiratory: Negative for shortness of breath.    Hematologic/Lymphatic: Negative for bleeding problem.   Skin: Negative for rash.   Musculoskeletal: Negative for myalgias.   Gastrointestinal: Negative for hematemesis, hematochezia and nausea.   Genitourinary: Negative for hematuria.   Neurological: Negative for light-headedness.   Psychiatric/Behavioral: Negative for altered mental  status.   Allergic/Immunologic: Negative for persistent infections.       Objective:          /81   Pulse (!) 54   Ht 6' (1.829 m)   Wt 105.9 kg (233 lb 7.5 oz)   BMI 31.66 kg/m²     Physical Exam  Vitals and nursing note reviewed.   Constitutional:       Appearance: Normal appearance. He is well-developed.   HENT:      Head: Normocephalic.      Nose: Nose normal.   Eyes:      Pupils: Pupils are equal, round, and reactive to light.   Cardiovascular:      Rate and Rhythm: Regular rhythm. Bradycardia present.   Pulmonary:      Effort: No respiratory distress.      Breath sounds: Normal breath sounds.   Musculoskeletal:         General: Normal range of motion.   Skin:     General: Skin is warm and dry.      Findings: No erythema.   Neurological:      Mental Status: He is alert and oriented to person, place, and time.   Psychiatric:         Speech: Speech normal.         Behavior: Behavior normal.       Lab Results   Component Value Date     01/05/2022    K 3.6 01/05/2022    BUN 21 (H) 01/05/2022    CREATININE 1.1 01/05/2022    ALT 62 (H) 11/05/2021    AST 30 11/05/2021    HGB 13.5 (L) 01/05/2022    HCT 41.3 01/05/2022    TSH 2.203 05/14/2020    LDLCALC 166.6 (H) 11/05/2021       Recent Labs   Lab 07/30/20  1630 01/05/22  1558   INR 1.0 0.9         Assessment:     1. Paroxysmal atrial fibrillation    2. Essential hypertension    3. Anticoagulant long-term use    4. History of cardioversion    5. History of radiofrequency ablation (RFA) procedure for cardiac arrhythmia    6. Persistent atrial fibrillation      Plan:     In summary, Mr. Sofia is a 57 y.o. male with HTN, AF here for follow up after ablation .  Pt is 2 mo s/p PVI and CTI-line. He is doing well from a rhythm standpoint, with no documented or symptomatic recurrence of arrhythmia since procedure. He does report more activity tolerance. Never felt palpitations. Feels well.  Reduce flecainide to 50mg BID. CHADSVASc 1 on xarelto.     Reduce  flecainide to 50mg BID  Continue other medications  RTC 3 mo, sooner if needed.    *A copy of this note has been sent to Dr. Shell*    Follow up in about 3 months (around 6/16/2022).    ------------------------------------------------------------------    GARO Moya, NP-C  Cardiac Electrophysiology

## 2022-03-16 ENCOUNTER — HOSPITAL ENCOUNTER (OUTPATIENT)
Dept: CARDIOLOGY | Facility: CLINIC | Age: 57
Discharge: HOME | End: 2022-03-16
Payer: COMMERCIAL

## 2022-03-16 ENCOUNTER — OFFICE VISIT (OUTPATIENT)
Dept: ELECTROPHYSIOLOGY | Facility: CLINIC | Age: 57
End: 2022-03-16
Payer: COMMERCIAL

## 2022-03-16 VITALS
HEIGHT: 72 IN | DIASTOLIC BLOOD PRESSURE: 81 MMHG | BODY MASS INDEX: 31.62 KG/M2 | SYSTOLIC BLOOD PRESSURE: 125 MMHG | HEART RATE: 54 BPM | WEIGHT: 233.44 LBS

## 2022-03-16 DIAGNOSIS — Z98.890 HISTORY OF RADIOFREQUENCY ABLATION (RFA) PROCEDURE FOR CARDIAC ARRHYTHMIA: ICD-10-CM

## 2022-03-16 DIAGNOSIS — Z92.89 HISTORY OF CARDIOVERSION: ICD-10-CM

## 2022-03-16 DIAGNOSIS — I48.0 PAROXYSMAL ATRIAL FIBRILLATION: Primary | ICD-10-CM

## 2022-03-16 DIAGNOSIS — I48.19 PERSISTENT ATRIAL FIBRILLATION: ICD-10-CM

## 2022-03-16 DIAGNOSIS — I10 ESSENTIAL HYPERTENSION: ICD-10-CM

## 2022-03-16 DIAGNOSIS — Z79.01 ANTICOAGULANT LONG-TERM USE: ICD-10-CM

## 2022-03-16 DIAGNOSIS — I49.8 OTHER SPECIFIED CARDIAC ARRHYTHMIAS: ICD-10-CM

## 2022-03-16 PROCEDURE — 93010 RHYTHM STRIP: ICD-10-PCS | Mod: S$GLB,,, | Performed by: INTERNAL MEDICINE

## 2022-03-16 PROCEDURE — 3074F SYST BP LT 130 MM HG: CPT | Mod: CPTII,S$GLB,, | Performed by: NURSE PRACTITIONER

## 2022-03-16 PROCEDURE — 1159F MED LIST DOCD IN RCRD: CPT | Mod: CPTII,S$GLB,, | Performed by: NURSE PRACTITIONER

## 2022-03-16 PROCEDURE — 99999 PR PBB SHADOW E&M-EST. PATIENT-LVL IV: CPT | Mod: PBBFAC,,, | Performed by: NURSE PRACTITIONER

## 2022-03-16 PROCEDURE — 99214 OFFICE O/P EST MOD 30 MIN: CPT | Mod: S$GLB,,, | Performed by: NURSE PRACTITIONER

## 2022-03-16 PROCEDURE — 1160F PR REVIEW ALL MEDS BY PRESCRIBER/CLIN PHARMACIST DOCUMENTED: ICD-10-PCS | Mod: CPTII,S$GLB,, | Performed by: NURSE PRACTITIONER

## 2022-03-16 PROCEDURE — 3008F PR BODY MASS INDEX (BMI) DOCUMENTED: ICD-10-PCS | Mod: CPTII,S$GLB,, | Performed by: NURSE PRACTITIONER

## 2022-03-16 PROCEDURE — 93010 ELECTROCARDIOGRAM REPORT: CPT | Mod: S$GLB,,, | Performed by: INTERNAL MEDICINE

## 2022-03-16 PROCEDURE — 93005 RHYTHM STRIP: ICD-10-PCS | Mod: S$GLB,,, | Performed by: INTERNAL MEDICINE

## 2022-03-16 PROCEDURE — 3079F PR MOST RECENT DIASTOLIC BLOOD PRESSURE 80-89 MM HG: ICD-10-PCS | Mod: CPTII,S$GLB,, | Performed by: NURSE PRACTITIONER

## 2022-03-16 PROCEDURE — 1160F RVW MEDS BY RX/DR IN RCRD: CPT | Mod: CPTII,S$GLB,, | Performed by: NURSE PRACTITIONER

## 2022-03-16 PROCEDURE — 93005 ELECTROCARDIOGRAM TRACING: CPT | Mod: S$GLB,,, | Performed by: INTERNAL MEDICINE

## 2022-03-16 PROCEDURE — 3074F PR MOST RECENT SYSTOLIC BLOOD PRESSURE < 130 MM HG: ICD-10-PCS | Mod: CPTII,S$GLB,, | Performed by: NURSE PRACTITIONER

## 2022-03-16 PROCEDURE — 1159F PR MEDICATION LIST DOCUMENTED IN MEDICAL RECORD: ICD-10-PCS | Mod: CPTII,S$GLB,, | Performed by: NURSE PRACTITIONER

## 2022-03-16 PROCEDURE — 3008F BODY MASS INDEX DOCD: CPT | Mod: CPTII,S$GLB,, | Performed by: NURSE PRACTITIONER

## 2022-03-16 PROCEDURE — 99214 PR OFFICE/OUTPT VISIT, EST, LEVL IV, 30-39 MIN: ICD-10-PCS | Mod: S$GLB,,, | Performed by: NURSE PRACTITIONER

## 2022-03-16 PROCEDURE — 99999 PR PBB SHADOW E&M-EST. PATIENT-LVL IV: ICD-10-PCS | Mod: PBBFAC,,, | Performed by: NURSE PRACTITIONER

## 2022-03-16 PROCEDURE — 3079F DIAST BP 80-89 MM HG: CPT | Mod: CPTII,S$GLB,, | Performed by: NURSE PRACTITIONER

## 2022-03-16 RX ORDER — FLECAINIDE ACETATE 100 MG/1
TABLET ORAL
Qty: 60 TABLET | Refills: 3
Start: 2022-03-16 | End: 2022-08-12 | Stop reason: SDUPTHER

## 2022-04-26 ENCOUNTER — TELEPHONE (OUTPATIENT)
Dept: RESEARCH | Facility: HOSPITAL | Age: 57
End: 2022-04-26
Payer: COMMERCIAL

## 2022-04-29 ENCOUNTER — RESEARCH ENCOUNTER (OUTPATIENT)
Dept: RESEARCH | Facility: HOSPITAL | Age: 57
End: 2022-04-29
Payer: COMMERCIAL

## 2022-04-29 NOTE — PROGRESS NOTES
Date of Call: 29 Apr 2022    Sponsor: [employer]     Study Title/IRB Number: [id number]    Principle Investigator: Dr Rafael Menjivar    Call made by: Jalil Hernandez [facility]     Spoke with patient for his six month follow up. Patient provided answers for all study required surveys and questionnaires.  The patient reported taking Xarelto as prescribed and no AEs to report .       Patient completed their participation in the study.

## 2022-05-30 ENCOUNTER — PATIENT MESSAGE (OUTPATIENT)
Dept: ADMINISTRATIVE | Facility: HOSPITAL | Age: 57
End: 2022-05-30
Payer: COMMERCIAL

## 2022-06-14 ENCOUNTER — TELEPHONE (OUTPATIENT)
Dept: ELECTROPHYSIOLOGY | Facility: CLINIC | Age: 57
End: 2022-06-14
Payer: COMMERCIAL

## 2022-08-17 NOTE — PROGRESS NOTES
Mr. Sofia is a patient of Dr. Shell and was last seen in clinic 3/16/2022.      Subjective:   Patient ID:  Jhonatan Sofia is a 57 y.o. male who presents for follow-up of Atrial Fibrillation  .     HPI:    Mr. Sofia is a 57 y.o. male with HTN, AF/AFL (PVI and CTI-line 1/12/2022) here for follow up.    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 He works at [employer] and gets yearly on-site physical exams with electrocardiograms. He reports they have been normal until this past March of 2020 when he was diagnosed with atrial fibrillation. He had no obvious symptoms however his wife and daughter report some increased fatigue that they have noted. He was seen by Dr. Sanchez who referred him to general cardiology. Low dose metoprolol and eliquis were prescribed and he was referred for further evaluation. He has yet to start eliquis.    He exercises regularly with heart rate monitoring and notes at times he sustains his heart rate in the 180s-200 bpm range. He does not use exercise supplements, does not drink alcohol, and wife reports no symptoms consistent with sleep apnea. He had an exercise stress echocardiogram in 2017 which noted no ischemia and showed a structurally normal left ventricle with mild left atrial enlargement (LVEF 55%). A recent echocardiogram noted a LVEF of 50% with a severely enlarged left atrium. A recent TSH lab test indicated a euthyroid state.    Available electrocardiograms in Epic which show normal sinus rhythm until an ECG dated 5/14/2020 which showed atrial fibrillation with controlled ventricular response.    His [id number] score is 1 and we discussed that anticoagulation long-term is a risk-benefit shared decision analysis. I also discussed the goal to reduce symptomatic arrhythmic episodes by pharmacologic and/or procedural methods and utilizing a rhythm versus a rate control strategy.  He may have latent symptoms per his family (fatigue). Discussed regardless at his age and activity level as well as this being the first documented event I strongly recommend an attempt at restoration of sinus rhythm to assess symptomatic response. He understood and agreed. Discussed need to take eliquis prior and 4 weeks after cardioversion. He can then decide on whether he wants to continue it long-term. If needed would use flecainide for rhythm control. Discussed risk atrial fibrillation in high-endurance athletes and advised to limit his exercise effort to maintain acceptable age-related exercise heart rate.     8/6/2020: Cardioversion was successfully performed with restoration of normal sinus rhythm.    Spoke with Mr. Sofia regarding his recurrent AF after his DCCV. He reports he felt great for ~5 days after DCCV and then went back into AF. Discussed starting an anti-arrhythmic drug and then repeating a DCCV. He stated he would like to do that. 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 Will start flecainide 100mg bid Sunday and have my nurse schedule an outpatient DCCV Wednesday or Thursday. He should continue eliquis and metoprolol. He has not missed any eliquis and thus will defer RADHIKA.    10/6/2020: He underwent cardioversion 8/6 with symptom improvement. Continued to have AF afterward and was placed on flecainide. In SR when presented for subsequent DCCV which was aborted. He is here for follow up.   He continues to feel better in SR. Has had 2 breakthrough episodes when he delayed his medications. One lasting several hours. Given his young age and symptomatic breakthrough AF despite flecainide, he would likely be a good candidate for PVI. (UPDATE: Dr. Shell confirms good PVI candidate). Risks, benefits, and alternatives discussed with patient. He is interested in proceeding but cannot fit it into his schedule until after the new year. Will have patient RTC in 3 mo to  confirm. In interim, continue flecainide. He has chronic bradycardia and is asymptomatic, but skips his metoprolol some days if his BP is low. Will reduce to 12.5mg toprol. He remains on eliquis for CVA prophylaxis (CHADSVASc 1).    4/14/2021: Overall he feels he is well controlled on flecainide. Only identifies breakthrough when he misses or delays meds. Does notice fast HR while exercising - likely SR given gradual trend on monitor. He would prefer to continue current regimen for now if it is safe. EKG with stable intervals. Update echo to confirm stability of heart function. Plan for PVI if EF is down or he identifies consistent AF breakthrough despite flecainide. CHADSVASc 1 and he remains on eliquis.    11/29/2021: Pt is out of rhythm today despite flecainide. He has had multiple AF breakthrough episodes. No overt symptoms but he does have a history of fatigue with AF and given his young age, rhythm control is recommended. PVI previously recommended by Dr. Shell. We discussed risks, benefits, and alternatives to PVI. Pt agrees to proceed. In interim will obtain Holter to determine whether his arrhythmia is paroxysmal or persistent. If persistent - will stop flecainide now. CHADSVASC 1 on eliquis.    1/12/2022:  Successful CTI ablation. Successful pulmonary vein RF ablation.    3/16/2022: Pt is 2 mo s/p PVI and CTI-line. He is doing well from a rhythm standpoint, with no documented or symptomatic recurrence of arrhythmia since procedure. He does report more activity tolerance. Never felt palpitations. Feels well.  Reduce flecainide to 50mg BID. CHADSVASc 1 on xarelto.     Update (08/24/2022):    Today he says he feels well. Has restarted exercise and feels good. No cardiac complaints. Mr. Sofia reports no chest pain with exertion or at rest, palpitations, SOB, JACOME, dizziness, or syncope.    He is currently taking xarelro 20mg daily for stroke prophylaxis and denies significant bleeding episodes. He is  currently being treated with flecainide 50mg BID for rhythm control and toprol 12.5mg daily for HR control.  Kidney function is stable, with a creatinine of 1.1 on 1/5/2022.    I have personally reviewed the patient's EKG today, which shows sinus rhythm with competing junctional rhythm at 50bpm. QRS is 9. QT is 430.    Relevant Cardiac Test Results:    2D Echo (5/12/2021):  · The left ventricle is normal in size with normal systolic function.  · The estimated ejection fraction is 63%.  · Normal left ventricular diastolic function.  · Severe left atrial enlargement.  · Normal right ventricular size with normal right ventricular systolic function.  · Moderate right atrial enlargement.  · Mild mitral regurgitation.  · Mild tricuspid regurgitation.  · Normal central venous pressure (3 mmHg).  · The estimated PA systolic pressure is 28 mmHg.  · Trivial pericardial effusion. Outside the RA.    Current Outpatient Medications   Medication Sig    flecainide (TAMBOCOR) 100 MG Tab TAKE 1 TABLET(100 MG) BY MOUTH EVERY 12 HOURS    metoprolol succinate (TOPROL-XL) 25 MG 24 hr tablet TAKE 1/2 TABLET(12.5 MG) BY MOUTH EVERY DAY    rivaroxaban (XARELTO) 20 mg Tab Take 1 tablet (20 mg total) by mouth every evening. (Take with dinner)     No current facility-administered medications for this visit.     Facility-Administered Medications Ordered in Other Visits   Medication    sodium chloride 0.9% bolus 1,000 mL    sodium chloride 0.9% bolus 1,000 mL       Review of Systems   Constitutional: Negative for malaise/fatigue.   Cardiovascular: Negative for chest pain, dyspnea on exertion, irregular heartbeat, leg swelling and palpitations.   Respiratory: Negative for shortness of breath.    Hematologic/Lymphatic: Negative for bleeding problem.   Skin: Negative for rash.   Musculoskeletal: Negative for myalgias.   Gastrointestinal: Negative for hematemesis, hematochezia and nausea.   Genitourinary: Negative for hematuria.   Neurological:  Negative for light-headedness.   Psychiatric/Behavioral: Negative for altered mental status.   Allergic/Immunologic: Negative for persistent infections.       Objective:          /86   Pulse (!) 50   Ht 6' (1.829 m)   Wt 104.8 kg (231 lb 0.7 oz)   BMI 31.33 kg/m²     Physical Exam  Vitals and nursing note reviewed.   Constitutional:       Appearance: Normal appearance. He is well-developed.   HENT:      Head: Normocephalic.      Nose: Nose normal.   Eyes:      Pupils: Pupils are equal, round, and reactive to light.   Cardiovascular:      Rate and Rhythm: Regular rhythm. Bradycardia present.   Pulmonary:      Effort: No respiratory distress.      Breath sounds: Normal breath sounds.   Musculoskeletal:         General: Normal range of motion.   Skin:     General: Skin is warm and dry.      Findings: No erythema.   Neurological:      Mental Status: He is alert and oriented to person, place, and time.   Psychiatric:         Speech: Speech normal.         Behavior: Behavior normal.           Lab Results   Component Value Date     01/05/2022    K 3.6 01/05/2022    BUN 21 (H) 01/05/2022    CREATININE 1.1 01/05/2022    ALT 62 (H) 11/05/2021    AST 30 11/05/2021    HGB 13.5 (L) 01/05/2022    HCT 41.3 01/05/2022    TSH 2.203 05/14/2020    LDLCALC 166.6 (H) 11/05/2021       Recent Labs   Lab 07/30/20  1630 01/05/22  1558   INR 1.0 0.9         Assessment:     1. Paroxysmal atrial fibrillation    2. Essential hypertension    3. Anticoagulant long-term use    4. History of cardioversion    5. History of radiofrequency ablation (RFA) procedure for cardiac arrhythmia      Plan:     In summary, Mr. Sofia is a 57 y.o. male with HTN, AF/AFL (PVI and CTI-line 1/12/2022) here for follow up.  He is now 7 months s/p PVI and CTI-line. Continues to feel well, with no documented or symptomatic recurrence of arrhythmia since procedure.  Has restarted exercise without any limitations in activity tolerance. Possible  junctional rhythm on EKG reverting to SR on rhythm strip. Asymptomatic.  Will stop flecainide now and recheck EKG in 4 weeks. If WNL, RTC 6 mo. CHADSVASc 1 on xarelto. We discussed AF monitoring at home.    Stop flecainide  Continue xarelto  EKG in 4 weeks  RTC 6 mo, sooner if needed    *A copy of this note has been sent to Dr. Shell*    Follow up in about 6 months (around 2/24/2023).    ------------------------------------------------------------------    GARO Moya, NP-C  Cardiac Electrophysiology

## 2022-08-24 ENCOUNTER — OFFICE VISIT (OUTPATIENT)
Dept: ELECTROPHYSIOLOGY | Facility: CLINIC | Age: 57
End: 2022-08-24
Payer: COMMERCIAL

## 2022-08-24 VITALS
WEIGHT: 231.06 LBS | SYSTOLIC BLOOD PRESSURE: 133 MMHG | HEART RATE: 50 BPM | BODY MASS INDEX: 31.3 KG/M2 | DIASTOLIC BLOOD PRESSURE: 86 MMHG | HEIGHT: 72 IN

## 2022-08-24 DIAGNOSIS — I49.8 OTHER SPECIFIED CARDIAC ARRHYTHMIAS: ICD-10-CM

## 2022-08-24 DIAGNOSIS — Z79.01 ANTICOAGULANT LONG-TERM USE: ICD-10-CM

## 2022-08-24 DIAGNOSIS — Z92.89 HISTORY OF CARDIOVERSION: ICD-10-CM

## 2022-08-24 DIAGNOSIS — I10 ESSENTIAL HYPERTENSION: ICD-10-CM

## 2022-08-24 DIAGNOSIS — I48.0 PAROXYSMAL ATRIAL FIBRILLATION: Primary | ICD-10-CM

## 2022-08-24 DIAGNOSIS — Z98.890 HISTORY OF RADIOFREQUENCY ABLATION (RFA) PROCEDURE FOR CARDIAC ARRHYTHMIA: ICD-10-CM

## 2022-08-24 PROCEDURE — 1160F RVW MEDS BY RX/DR IN RCRD: CPT | Mod: CPTII,S$GLB,, | Performed by: NURSE PRACTITIONER

## 2022-08-24 PROCEDURE — 1159F MED LIST DOCD IN RCRD: CPT | Mod: CPTII,S$GLB,, | Performed by: NURSE PRACTITIONER

## 2022-08-24 PROCEDURE — 1159F PR MEDICATION LIST DOCUMENTED IN MEDICAL RECORD: ICD-10-PCS | Mod: CPTII,S$GLB,, | Performed by: NURSE PRACTITIONER

## 2022-08-24 PROCEDURE — 93005 RHYTHM STRIP: ICD-10-PCS | Mod: S$GLB,,, | Performed by: INTERNAL MEDICINE

## 2022-08-24 PROCEDURE — 99214 PR OFFICE/OUTPT VISIT, EST, LEVL IV, 30-39 MIN: ICD-10-PCS | Mod: S$GLB,,, | Performed by: NURSE PRACTITIONER

## 2022-08-24 PROCEDURE — 1160F PR REVIEW ALL MEDS BY PRESCRIBER/CLIN PHARMACIST DOCUMENTED: ICD-10-PCS | Mod: CPTII,S$GLB,, | Performed by: NURSE PRACTITIONER

## 2022-08-24 PROCEDURE — 3075F SYST BP GE 130 - 139MM HG: CPT | Mod: CPTII,S$GLB,, | Performed by: NURSE PRACTITIONER

## 2022-08-24 PROCEDURE — 3075F PR MOST RECENT SYSTOLIC BLOOD PRESS GE 130-139MM HG: ICD-10-PCS | Mod: CPTII,S$GLB,, | Performed by: NURSE PRACTITIONER

## 2022-08-24 PROCEDURE — 93010 RHYTHM STRIP: ICD-10-PCS | Mod: S$GLB,,, | Performed by: INTERNAL MEDICINE

## 2022-08-24 PROCEDURE — 3008F PR BODY MASS INDEX (BMI) DOCUMENTED: ICD-10-PCS | Mod: CPTII,S$GLB,, | Performed by: NURSE PRACTITIONER

## 2022-08-24 PROCEDURE — 99999 PR PBB SHADOW E&M-EST. PATIENT-LVL III: ICD-10-PCS | Mod: PBBFAC,,, | Performed by: NURSE PRACTITIONER

## 2022-08-24 PROCEDURE — 3008F BODY MASS INDEX DOCD: CPT | Mod: CPTII,S$GLB,, | Performed by: NURSE PRACTITIONER

## 2022-08-24 PROCEDURE — 99214 OFFICE O/P EST MOD 30 MIN: CPT | Mod: S$GLB,,, | Performed by: NURSE PRACTITIONER

## 2022-08-24 PROCEDURE — 3079F DIAST BP 80-89 MM HG: CPT | Mod: CPTII,S$GLB,, | Performed by: NURSE PRACTITIONER

## 2022-08-24 PROCEDURE — 99999 PR PBB SHADOW E&M-EST. PATIENT-LVL III: CPT | Mod: PBBFAC,,, | Performed by: NURSE PRACTITIONER

## 2022-08-24 PROCEDURE — 93005 ELECTROCARDIOGRAM TRACING: CPT | Mod: S$GLB,,, | Performed by: INTERNAL MEDICINE

## 2022-08-24 PROCEDURE — 3079F PR MOST RECENT DIASTOLIC BLOOD PRESSURE 80-89 MM HG: ICD-10-PCS | Mod: CPTII,S$GLB,, | Performed by: NURSE PRACTITIONER

## 2022-08-24 PROCEDURE — 93010 ELECTROCARDIOGRAM REPORT: CPT | Mod: S$GLB,,, | Performed by: INTERNAL MEDICINE

## 2022-10-03 DIAGNOSIS — I49.8 OTHER SPECIFIED CARDIAC ARRHYTHMIAS: Primary | ICD-10-CM

## 2022-10-03 DIAGNOSIS — I48.19 PERSISTENT ATRIAL FIBRILLATION: ICD-10-CM

## 2022-10-03 RX ORDER — METOPROLOL SUCCINATE 25 MG/1
TABLET, EXTENDED RELEASE ORAL
Qty: 45 TABLET | OUTPATIENT
Start: 2022-10-03

## 2022-10-03 NOTE — TELEPHONE ENCOUNTER
Called patient in response to message received from Shari Dc NP to get patient scheduled for an EKG. Patient scheduled for tomorrow. Patient agreed to appointment.

## 2022-10-04 ENCOUNTER — HOSPITAL ENCOUNTER (OUTPATIENT)
Dept: CARDIOLOGY | Facility: CLINIC | Age: 57
Discharge: HOME | End: 2022-10-04
Payer: COMMERCIAL

## 2022-10-04 DIAGNOSIS — I48.0 PAROXYSMAL ATRIAL FIBRILLATION: ICD-10-CM

## 2022-10-04 PROCEDURE — 93010 ELECTROCARDIOGRAM REPORT: CPT | Mod: S$GLB,,, | Performed by: INTERNAL MEDICINE

## 2022-10-04 PROCEDURE — 93005 EKG 12-LEAD: ICD-10-PCS | Mod: S$GLB,,, | Performed by: NURSE PRACTITIONER

## 2022-10-04 PROCEDURE — 93005 ELECTROCARDIOGRAM TRACING: CPT | Mod: S$GLB,,, | Performed by: NURSE PRACTITIONER

## 2022-10-04 PROCEDURE — 93010 EKG 12-LEAD: ICD-10-PCS | Mod: S$GLB,,, | Performed by: INTERNAL MEDICINE

## 2022-10-05 ENCOUNTER — TELEPHONE (OUTPATIENT)
Dept: ELECTROPHYSIOLOGY | Facility: CLINIC | Age: 57
End: 2022-10-05
Payer: COMMERCIAL

## 2023-01-23 DIAGNOSIS — I48.0 PAROXYSMAL ATRIAL FIBRILLATION: Primary | ICD-10-CM

## 2023-01-23 RX ORDER — RIVAROXABAN 20 MG/1
TABLET, FILM COATED ORAL
Qty: 30 TABLET | Refills: 11 | Status: SHIPPED | OUTPATIENT
Start: 2023-01-23 | End: 2023-06-20 | Stop reason: ALTCHOICE

## 2023-01-24 ENCOUNTER — PATIENT MESSAGE (OUTPATIENT)
Dept: ELECTROPHYSIOLOGY | Facility: CLINIC | Age: 58
End: 2023-01-24
Payer: COMMERCIAL

## 2023-01-24 NOTE — TELEPHONE ENCOUNTER
Spoke with patient. Feels like he may be having pAF again. Pulse randomly noted over 100 bpm. He is typically in the 50s-60s. Kardia strips are unclear, could be sinus tach, could be an atrial flutter. Recommend Bardy patch and then will discuss on the phone. Would likely recommend redo-ablation should he have recurrent AF/AFL. He understood.

## 2023-01-25 ENCOUNTER — HOSPITAL ENCOUNTER (EMERGENCY)
Facility: HOSPITAL | Age: 58
Discharge: HOME | End: 2023-01-26
Attending: EMERGENCY MEDICINE
Payer: COMMERCIAL

## 2023-01-25 VITALS
HEART RATE: 82 BPM | TEMPERATURE: 98 F | BODY MASS INDEX: 29.84 KG/M2 | WEIGHT: 220 LBS | DIASTOLIC BLOOD PRESSURE: 87 MMHG | RESPIRATION RATE: 18 BRPM | SYSTOLIC BLOOD PRESSURE: 123 MMHG | OXYGEN SATURATION: 95 %

## 2023-01-25 DIAGNOSIS — I48.0 PAROXYSMAL ATRIAL FIBRILLATION: Primary | ICD-10-CM

## 2023-01-25 DIAGNOSIS — R00.0 TACHYCARDIA: ICD-10-CM

## 2023-01-25 DIAGNOSIS — I48.91 ATRIAL FIBRILLATION: ICD-10-CM

## 2023-01-25 DIAGNOSIS — Z98.890 HISTORY OF RADIOFREQUENCY ABLATION (RFA) PROCEDURE FOR CARDIAC ARRHYTHMIA: ICD-10-CM

## 2023-01-25 DIAGNOSIS — I48.3 TYPICAL ATRIAL FLUTTER: ICD-10-CM

## 2023-01-25 DIAGNOSIS — R00.2 PALPITATION: ICD-10-CM

## 2023-01-25 LAB
ALBUMIN SERPL BCP-MCNC: 4 G/DL (ref 3.5–5.2)
ALP SERPL-CCNC: 57 U/L (ref 55–135)
ALT SERPL W/O P-5'-P-CCNC: 26 U/L (ref 10–44)
ANION GAP SERPL CALC-SCNC: 10 MMOL/L (ref 8–16)
AST SERPL-CCNC: 23 U/L (ref 10–40)
BASOPHILS # BLD AUTO: 0.04 K/UL (ref 0–0.2)
BASOPHILS NFR BLD: 0.6 % (ref 0–1.9)
BILIRUB SERPL-MCNC: 0.4 MG/DL (ref 0.1–1)
BUN SERPL-MCNC: 28 MG/DL (ref 6–20)
CALCIUM SERPL-MCNC: 10 MG/DL (ref 8.7–10.5)
CHLORIDE SERPL-SCNC: 109 MMOL/L (ref 95–110)
CO2 SERPL-SCNC: 22 MMOL/L (ref 23–29)
CREAT SERPL-MCNC: 1.2 MG/DL (ref 0.5–1.4)
DIFFERENTIAL METHOD: ABNORMAL
EOSINOPHIL # BLD AUTO: 0.1 K/UL (ref 0–0.5)
EOSINOPHIL NFR BLD: 1.4 % (ref 0–8)
ERYTHROCYTE [DISTWIDTH] IN BLOOD BY AUTOMATED COUNT: 13.1 % (ref 11.5–14.5)
EST. GFR  (NO RACE VARIABLE): >60 ML/MIN/1.73 M^2
GLUCOSE SERPL-MCNC: 95 MG/DL (ref 70–110)
HCT VFR BLD AUTO: 39.7 % (ref 40–54)
HCV AB SERPL QL IA: NORMAL
HGB BLD-MCNC: 13.7 G/DL (ref 14–18)
HIV 1+2 AB+HIV1 P24 AG SERPL QL IA: NORMAL
IMM GRANULOCYTES # BLD AUTO: 0.01 K/UL (ref 0–0.04)
IMM GRANULOCYTES NFR BLD AUTO: 0.2 % (ref 0–0.5)
LYMPHOCYTES # BLD AUTO: 2.4 K/UL (ref 1–4.8)
LYMPHOCYTES NFR BLD: 37.6 % (ref 18–48)
MAGNESIUM SERPL-MCNC: 1.9 MG/DL (ref 1.6–2.6)
MCH RBC QN AUTO: 32.7 PG (ref 27–31)
MCHC RBC AUTO-ENTMCNC: 34.5 G/DL (ref 32–36)
MCV RBC AUTO: 95 FL (ref 82–98)
MONOCYTES # BLD AUTO: 0.5 K/UL (ref 0.3–1)
MONOCYTES NFR BLD: 8.3 % (ref 4–15)
NEUTROPHILS # BLD AUTO: 3.3 K/UL (ref 1.8–7.7)
NEUTROPHILS NFR BLD: 51.9 % (ref 38–73)
NRBC BLD-RTO: 0 /100 WBC
PLATELET # BLD AUTO: 231 K/UL (ref 150–450)
PMV BLD AUTO: 10.9 FL (ref 9.2–12.9)
POTASSIUM SERPL-SCNC: 4.1 MMOL/L (ref 3.5–5.1)
PROT SERPL-MCNC: 7.1 G/DL (ref 6–8.4)
RBC # BLD AUTO: 4.19 M/UL (ref 4.6–6.2)
SODIUM SERPL-SCNC: 141 MMOL/L (ref 136–145)
TROPONIN I SERPL DL<=0.01 NG/ML-MCNC: 0.01 NG/ML (ref 0–0.03)
TSH SERPL DL<=0.005 MIU/L-ACNC: 3.41 UIU/ML (ref 0.4–4)
WBC # BLD AUTO: 6.39 K/UL (ref 3.9–12.7)

## 2023-01-25 PROCEDURE — 84484 ASSAY OF TROPONIN QUANT: CPT | Performed by: EMERGENCY MEDICINE

## 2023-01-25 PROCEDURE — 25000003 PHARM REV CODE 250

## 2023-01-25 PROCEDURE — 93005 ELECTROCARDIOGRAM TRACING: CPT

## 2023-01-25 PROCEDURE — 93010 ELECTROCARDIOGRAM REPORT: CPT | Mod: ,,, | Performed by: INTERNAL MEDICINE

## 2023-01-25 PROCEDURE — 99284 EMERGENCY DEPT VISIT MOD MDM: CPT | Mod: 25

## 2023-01-25 PROCEDURE — 84443 ASSAY THYROID STIM HORMONE: CPT | Performed by: EMERGENCY MEDICINE

## 2023-01-25 PROCEDURE — 80053 COMPREHEN METABOLIC PANEL: CPT | Performed by: EMERGENCY MEDICINE

## 2023-01-25 PROCEDURE — 83735 ASSAY OF MAGNESIUM: CPT | Performed by: EMERGENCY MEDICINE

## 2023-01-25 PROCEDURE — 85025 COMPLETE CBC W/AUTO DIFF WBC: CPT | Performed by: EMERGENCY MEDICINE

## 2023-01-25 PROCEDURE — 86803 HEPATITIS C AB TEST: CPT | Performed by: PHYSICIAN ASSISTANT

## 2023-01-25 PROCEDURE — 93010 EKG 12-LEAD: ICD-10-PCS | Mod: ,,, | Performed by: INTERNAL MEDICINE

## 2023-01-25 PROCEDURE — 99284 EMERGENCY DEPT VISIT MOD MDM: CPT | Mod: ,,, | Performed by: EMERGENCY MEDICINE

## 2023-01-25 PROCEDURE — 99284 PR EMERGENCY DEPT VISIT,LEVEL IV: ICD-10-PCS | Mod: ,,, | Performed by: EMERGENCY MEDICINE

## 2023-01-25 PROCEDURE — 81001 URINALYSIS AUTO W/SCOPE: CPT | Performed by: EMERGENCY MEDICINE

## 2023-01-25 PROCEDURE — 96374 THER/PROPH/DIAG INJ IV PUSH: CPT

## 2023-01-25 PROCEDURE — 87389 HIV-1 AG W/HIV-1&-2 AB AG IA: CPT | Performed by: PHYSICIAN ASSISTANT

## 2023-01-25 RX ORDER — METOPROLOL TARTRATE 25 MG/1
25 TABLET, FILM COATED ORAL
Status: COMPLETED | OUTPATIENT
Start: 2023-01-25 | End: 2023-01-25

## 2023-01-25 RX ORDER — METOPROLOL TARTRATE 1 MG/ML
5 INJECTION, SOLUTION INTRAVENOUS
Status: COMPLETED | OUTPATIENT
Start: 2023-01-25 | End: 2023-01-25

## 2023-01-25 RX ADMIN — METOROPROLOL TARTRATE 5 MG: 5 INJECTION, SOLUTION INTRAVENOUS at 11:01

## 2023-01-25 RX ADMIN — METOPROLOL TARTRATE 25 MG: 25 TABLET, FILM COATED ORAL at 11:01

## 2023-01-26 ENCOUNTER — PATIENT MESSAGE (OUTPATIENT)
Dept: ELECTROPHYSIOLOGY | Facility: CLINIC | Age: 58
End: 2023-01-26
Payer: COMMERCIAL

## 2023-01-26 ENCOUNTER — TELEPHONE (OUTPATIENT)
Dept: ELECTROPHYSIOLOGY | Facility: CLINIC | Age: 58
End: 2023-01-26
Payer: COMMERCIAL

## 2023-01-26 LAB
BACTERIA #/AREA URNS AUTO: ABNORMAL /HPF
BILIRUB UR QL STRIP: NEGATIVE
CLARITY UR REFRACT.AUTO: CLEAR
COLOR UR AUTO: YELLOW
GLUCOSE UR QL STRIP: NEGATIVE
HGB UR QL STRIP: ABNORMAL
HYALINE CASTS UR QL AUTO: 1 /LPF
KETONES UR QL STRIP: ABNORMAL
LEUKOCYTE ESTERASE UR QL STRIP: NEGATIVE
MICROSCOPIC COMMENT: ABNORMAL
NITRITE UR QL STRIP: NEGATIVE
PH UR STRIP: 5 [PH] (ref 5–8)
PROT UR QL STRIP: NEGATIVE
RBC #/AREA URNS AUTO: 5 /HPF (ref 0–4)
SP GR UR STRIP: 1.02 (ref 1–1.03)
URN SPEC COLLECT METH UR: ABNORMAL

## 2023-01-26 PROCEDURE — 93010 ELECTROCARDIOGRAM REPORT: CPT | Mod: ,,, | Performed by: INTERNAL MEDICINE

## 2023-01-26 PROCEDURE — 93005 ELECTROCARDIOGRAM TRACING: CPT

## 2023-01-26 PROCEDURE — 93010 EKG 12-LEAD: ICD-10-PCS | Mod: ,,, | Performed by: INTERNAL MEDICINE

## 2023-01-26 RX ORDER — METOPROLOL SUCCINATE 25 MG/1
25 TABLET, EXTENDED RELEASE ORAL DAILY
Qty: 30 TABLET | Refills: 0 | Status: SHIPPED | OUTPATIENT
Start: 2023-01-26 | End: 2023-02-13

## 2023-01-26 NOTE — ED PROVIDER NOTES
Encounter Date: 1/25/2023       History     Chief Complaint   Patient presents with    Elevated Heart Rate     Hx of afib and ablation. Pt concerned that he is going back into afib. He's getting conflicting readings on his home monitor. One read HR 85, and another read . Denies palpitations, SOB, or chest pain. HR 130s in triage      Jhonatan Scotty Sofia is a 57 y.o. male with a PMH of atrial fibrillation diagnosed 4 years ago, s/p cardio ablation in January 2022 presenting to [facility] Emergency Department for evaluation of tachycardia and arrhythmia. 2 days ago he noticed his heart rate was 150 on his watch while he was at rest, so he spoke with his cardiologist and was scheduled to see them to wear holter monitor. Tonight he noticed his HR was  with rapid changes and was concerned. He used a home device that provides a 1 lead rhythm strip and saw the reading was atrial fibrillation and came to the ED. Denies any symptoms including fever, chills, cough, congestion, chest pain, dyspnea, nausea, vomiting, abdominal pain, diarrhea, dysuria, or lower extremity swelling. States he was weaned from metoprolol and flecainide following his cardioablation, but still takes xarelto.      The history is provided by the patient, the spouse and medical records. No  was used.   Review of patient's allergies indicates:  No Known Allergies  Past Medical History:   Diagnosis Date    Anticoagulant long-term use     Atrial fibrillation     Hyperlipidemia     Hypertension     Kidney stones      Past Surgical History:   Procedure Laterality Date    ABLATION OF ARRHYTHMOGENIC FOCUS FOR ATRIAL FIBRILLATION N/A 1/12/2022    Procedure: Ablation atrial fibrillation;  Surgeon: Scotty Shell MD;  Location: [facility] EP LAB;  Service: Cardiology;  Laterality: N/A;  AF/AFL, RADHIKA (Cx if SR), PVI, CTI, RFA, Carto, Gen, SC, 3 Prep    CARDIOVERSION      SHOULDER SURGERY      TREATMENT OF CARDIAC ARRHYTHMIA N/A 8/6/2020     Procedure: CARDIOVERSION;  Surgeon: Scotty Shell MD;  Location: [facility] EP LAB;  Service: Cardiology;  Laterality: N/A;  AF, RADHIKA, DCCV, MAC, OR, 3 Prep     Family History   Problem Relation Age of Onset    Heart disease Father     Kidney failure Father     Liver disease Sister         fatty liver    Arthritis Sister     Arthritis Sister      Social History     Tobacco Use    Smoking status: Never    Smokeless tobacco: Never   Substance Use Topics    Alcohol use: Yes     Comment: socially    Drug use: Never     Review of Systems    Physical Exam     Initial Vitals [01/25/23 2136]   BP Pulse Resp Temp SpO2   (!) 140/102 (!) 130 18 97.5 °F (36.4 °C) 95 %      MAP       --         Physical Exam    Nursing note and vitals reviewed.  Constitutional: He appears well-developed and well-nourished. He is not diaphoretic. No distress.   HENT:   Head: Normocephalic and atraumatic.   Right Ear: External ear normal.   Left Ear: External ear normal.   Mouth/Throat: Oropharynx is clear and moist.   Eyes: Conjunctivae and EOM are normal. No scleral icterus.   Neck: Neck supple.   Cardiovascular:  Intact distal pulses.           No murmur heard.  Tachycardic, irregularly irregular rhythm    Pulmonary/Chest: Breath sounds normal. No stridor. No respiratory distress. He has no wheezes. He has no rhonchi. He has no rales.   Abdominal: Abdomen is soft. He exhibits no distension. There is no abdominal tenderness. There is no rebound and no guarding.   Musculoskeletal:         General: No edema.      Cervical back: Neck supple.     Neurological: He is alert and oriented to person, place, and time. GCS score is 15. GCS eye subscore is 4. GCS verbal subscore is 5. GCS motor subscore is 6.   Skin: Skin is warm and dry. Capillary refill takes less than 2 seconds. No rash noted.   Psychiatric: He has a normal mood and affect.       ED Course   Procedures  Labs Reviewed   COMPREHENSIVE METABOLIC PANEL - Abnormal; Notable for the following  components:       Result Value    CO2 22 (*)     BUN 28 (*)     All other components within normal limits    Narrative:     Release to patient->Immediate   CBC W/ AUTO DIFFERENTIAL - Abnormal; Notable for the following components:    RBC 4.19 (*)     Hemoglobin 13.7 (*)     Hematocrit 39.7 (*)     MCH 32.7 (*)     All other components within normal limits    Narrative:     Release to patient->Immediate   URINALYSIS, REFLEX TO URINE CULTURE - Abnormal; Notable for the following components:    Ketones, UA Trace (*)     Occult Blood UA 2+ (*)     All other components within normal limits    Narrative:     Specimen Source->Urine   URINALYSIS MICROSCOPIC - Abnormal; Notable for the following components:    RBC, UA 5 (*)     All other components within normal limits    Narrative:     Specimen Source->Urine   HIV 1 / 2 ANTIBODY    Narrative:     Release to patient->Immediate   HEPATITIS C ANTIBODY    Narrative:     Release to patient->Immediate   MAGNESIUM    Narrative:     Release to patient->Immediate   TROPONIN I    Narrative:     Release to patient->Immediate   TSH    Narrative:     Release to patient->Immediate          Imaging Results    None          Medications   metoprolol injection 5 mg (5 mg Intravenous Given 1/25/23 5764)   metoprolol tartrate (LOPRESSOR) tablet 25 mg (25 mg Oral Given 1/25/23 8327)     Medical Decision Making:   History:   Old Medical Records: I decided to obtain old medical records.  Old Records Summarized: records from clinic visits, records from previous admission(s) and records from another hospital.  Initial Assessment:   Patient is hemodynamically stable, afebrile, and euvolemic on exam. Tachycardic with an irregularly irregular rhythm on exam.   Differential Diagnosis:   Differential diagnoses considered include, but not limited to:  Atrial fibrillation with RVR   Atrial flutter with RVR   Other arrhythmia       Lower concern for ACS or CHF given patient has no pain or other symptoms  and is euvolemic.   Clinical Tests:   Lab Tests: Ordered and Reviewed  Radiological Study: Ordered and Reviewed  Medical Tests: Ordered and Reviewed  ED Management:  Jhonatan Sofia is a 57 year old male with a history of atrial fibrillation s/p ablation in January 2022 presenting to the ED for tachycardia and arrhythmia.     CBC unremarkable without leukocytosis, significant anemia, or decreased platelets  CMP unremarkable without significant electrolyte derangement, impaired renal function, or elevated LFTs  TSH, magnesium, trop WNL.    Initial EKG - a flutter rate 125, no STEMI   Metoprolol 5 mg IV given in the ED with good response ( adequate rate control) so 25 PO metoprolol given     Repeat EKG atrial flutter with 4:1 conduction, rate 85, no STEMI     See ED course.           Attending Attestation:   Physician Attestation Statement for Resident:  As the supervising MD   Physician Attestation Statement: I have personally seen and examined this patient.   I agree with the above history.  -:   As the supervising MD I agree with the above PE.     As the supervising MD I agree with the above treatment, course, plan, and disposition.                  ED Course as of 01/26/23 0740   Wed Jan 25, 2023   2143 EKG received/reviewed. No STEMI.  Atrial flutter with rapid ventricular response [LP]   2332 Heart rate well controlled at 81 bpm after metoprolol 5 mg IV given [OW]   2357 Pulse: 82  Rate controlled with metoprolol. Labs without acute abnormalities. Will discharge patient home with metoprolol 2 mg qd and cardiology referral. Discussed plan, medication, follow-up, and return precautions with patient and his wife at bedside and they expressed understanding and agreement.  [OW]      ED Course User Index  [LP] Santo Farmer III, MD  [OW] Rosalba Cole MD                 Clinical Impression:   Final diagnoses:  [R00.0] Tachycardia  [I48.0] Paroxysmal atrial fibrillation (Primary)  [I48.91] Atrial fibrillation         ED Disposition Condition    Discharge Stable          ED Prescriptions       Medication Sig Dispense Start Date End Date Auth. Provider    metoprolol succinate (TOPROL-XL) 25 MG 24 hr tablet Take 1 tablet (25 mg total) by mouth once daily. 30 tablet 1/26/2023 2/25/2023 Rosalba Cole MD          Follow-up Information       Follow up With Specialties Details Why Contact Info Additional Information    Jose Sanchez MD Internal Medicine   [address]  [facility] [address], [address]  [place] [address]  [phone]       [facility] - Cardiology - [facility] Cardiology   [address] [id number] [phone] Cardiology Services Clinics - 3rd floor             Rosalba Cole MD  Resident  01/26/23 9204       Cindi Kang MD  01/26/23 6830

## 2023-01-26 NOTE — TELEPHONE ENCOUNTER
Spoke Mr. Sofia. He is in an atypical AFL, appears rate controlled. He desires redo ablation. Recommend continuing with extended holter for now to ensure he is rate controlled    Plan  Redo-PVI, atypical AFL ablation  Carto  Anesthesia  RADHIKA day of, cancel if in sinus rhythm  Ok to take xarelto evening prior  Start protonix 40mg daily 2 weeks prior, continue for 4 weeks after  Hold metoprolol 5 days prior  Likely will give low dose flecainide for 3 months after.

## 2023-01-26 NOTE — ED TRIAGE NOTES
Jhonatan Sofia, a 57 y.o. male presents to the ED w/ complaint of concern of A-fib. Pt states he checked his HR and BP at home after dinner and his HR was reading 130s, but on BP cuff it was reading in the 80s. Pt denies any current chest pain, SOB, or dizziness. Hx of A-fib with ablation in January of 2022.     Triage note:  Chief Complaint   Patient presents with    Elevated Heart Rate     Hx of afib and ablation. Pt concerned that he is going back into afib. He's getting conflicting readings on his home monitor. One read HR 85, and another read . Denies palpitations, SOB, or chest pain. HR 130s in triage      Review of patient's allergies indicates:  No Known Allergies  Past Medical History:   Diagnosis Date    Anticoagulant long-term use     Atrial fibrillation     Hyperlipidemia     Hypertension     Kidney stones

## 2023-01-26 NOTE — DISCHARGE INSTRUCTIONS
Diagnosis: atrial fibrillation    Tests today showed:   Labs Reviewed   COMPREHENSIVE METABOLIC PANEL - Abnormal; Notable for the following components:       Result Value    CO2 22 (*)     BUN 28 (*)     All other components within normal limits    Narrative:     Release to patient->Immediate   CBC W/ AUTO DIFFERENTIAL - Abnormal; Notable for the following components:    RBC 4.19 (*)     Hemoglobin 13.7 (*)     Hematocrit 39.7 (*)     MCH 32.7 (*)     All other components within normal limits    Narrative:     Release to patient->Immediate   HIV 1 / 2 ANTIBODY    Narrative:     Release to patient->Immediate   HEPATITIS C ANTIBODY    Narrative:     Release to patient->Immediate   MAGNESIUM    Narrative:     Release to patient->Immediate   TROPONIN I    Narrative:     Release to patient->Immediate   TSH    Narrative:     Release to patient->Immediate   URINALYSIS, REFLEX TO URINE CULTURE     No orders to display       Treatments you had today:   Medications   metoprolol injection 5 mg (5 mg Intravenous Given 1/25/23 3680)   metoprolol tartrate (LOPRESSOR) tablet 25 mg (25 mg Oral Given 1/25/23 2346)       Follow-Up Plan:  - Start taking metoprolol as prescribed   - Continue taking Xarelto as prescribed   - Please follow up with your cardiologist   - Follow-up with primary care doctor within 3 - 5 days  - Additional testing and/or evaluation as directed by your primary doctor    Return to the Emergency Department for symptoms including but not limited to: worsening symptoms, shortness of breath or chest pain, vomiting with inability to hold down fluids, fevers greater than 100.4°F, passing out/fainting/unconsciousness, or other concerning symptoms.

## 2023-01-27 ENCOUNTER — PATIENT OUTREACH (OUTPATIENT)
Dept: ADMINISTRATIVE | Facility: HOSPITAL | Age: 58
End: 2023-01-27
Payer: COMMERCIAL

## 2023-01-27 ENCOUNTER — CLINICAL SUPPORT (OUTPATIENT)
Dept: CARDIOLOGY | Facility: HOSPITAL | Age: 58
End: 2023-01-27
Attending: INTERNAL MEDICINE
Payer: COMMERCIAL

## 2023-01-27 DIAGNOSIS — I48.0 PAROXYSMAL ATRIAL FIBRILLATION: ICD-10-CM

## 2023-01-27 PROCEDURE — 93248 CV CARDIAC MONITOR - 3-15 DAY ADULT (CUPID ONLY): ICD-10-PCS | Mod: ,,, | Performed by: INTERNAL MEDICINE

## 2023-01-27 PROCEDURE — 93248 EXT ECG>7D<15D REV&INTERPJ: CPT | Mod: ,,, | Performed by: INTERNAL MEDICINE

## 2023-01-27 NOTE — PROGRESS NOTES
Health Maintenance Due   Topic Date Due    COVID-19 Vaccine (1) Never done    Pneumococcal Vaccines (Age 0-64) (1 - PCV) Never done    Colorectal Cancer Screening  Never done    Shingles Vaccine (1 of 2) Never done    Influenza Vaccine (1) Never done    Lipid Panel  11/05/2022     Chart reviewed.   Immunizations: Reconciled  Orders placed: N/A  Upcoming appts to satisfy KIM topics: N/A

## 2023-02-05 NOTE — PROGRESS NOTES
MEDICAL HISTORY:  Paroxysmal atrial fibrillation, status post ablation  Hypertension  Hyperlipidemia  Nephrolithiasis.  Right shoulder surgery.     SOCIAL HISTORY:  Alcohol use, very rare.  Tobacco use, none.  Exercise as described above.  , has one child.     FAMILY HISTORY:  Father is ; heart disease, kidney disease.  Mother is alive, no major health conditions.  Two sisters, both with arthritis.  One sister with fatty liver.  Brother in good health.    Medications  Metoprolol XL 25 mg   Xarelto 20 mg  Answers submitted by the patient for this visit:  Review of Systems Questionnaire (Submitted on 2023)  activity change: No  unexpected weight change: No  neck pain: No  hearing loss: No  rhinorrhea: No  trouble swallowing: No  eye discharge: No  visual disturbance: No  chest tightness: No  wheezing: No  chest pain: No  palpitations: No  blood in stool: No  constipation: No  vomiting: No  diarrhea: No  polydipsia: No  polyuria: No  difficulty urinating: No  urgency: No  hematuria: No  joint swelling: No  arthralgias: No  headaches: No  weakness: No  confusion: No  dysphoric mood: No        57-year-old male  Comes in for an annual routine visit       It is noted that  presented emergency room when his cardiac monitor the uses sometimes some noted tachycardia.  He was asymptomatic did not feel abnormal.  Was noted that he was in atrial flutter with variable AV block then later 4-1 block.    Metoprolol XL 25 mg a day was initiated.  Previously he was on the medication flecainide but was discontinued in 2022.  Just completed a 10 day monitor and results are pending    He is reported no chest pain, palpitations, shortness a breath    Review of symptoms  Negative for abdominal pain.  Regular bowel function.  No difficulty urinating.  No indigestion, heartburn, headaches    Examination   Weight 225 lb   Pulse 48   Blood pressure 118/72   HEENT exam no abnormal findings  Neck no  thyromegaly no masses  Chest clear breath sounds good effort  Heart regular rate rhythm occasional ectopy  Abdominal exam nontender, soft, no hepatosplenomegaly abdominal masses  Pulses 2+ carotid pulses no bruits 2+ pedal pulses  Extremities no edema  Lymph gland palpable adenopathy  Skin no gross abnormal findings    Impression  General examination   Paroxysmal atrial fibrillation/atrial flutter  Prostate cancer screening  Colon cancer screening    Plan   Repeat CBC.  Lipid profile PSA test.  Patient had a normal chemistry and TSH  Screening colonoscopy       Continue with attention a proper diet physical activity

## 2023-02-06 ENCOUNTER — LAB VISIT (OUTPATIENT)
Dept: LAB | Facility: HOSPITAL | Age: 58
End: 2023-02-06
Attending: INTERNAL MEDICINE
Payer: COMMERCIAL

## 2023-02-06 ENCOUNTER — OFFICE VISIT (OUTPATIENT)
Dept: INTERNAL MEDICINE | Facility: CLINIC | Age: 58
End: 2023-02-06
Payer: COMMERCIAL

## 2023-02-06 VITALS
HEART RATE: 44 BPM | BODY MASS INDEX: 30.61 KG/M2 | OXYGEN SATURATION: 98 % | DIASTOLIC BLOOD PRESSURE: 80 MMHG | WEIGHT: 226 LBS | SYSTOLIC BLOOD PRESSURE: 116 MMHG | HEIGHT: 72 IN

## 2023-02-06 DIAGNOSIS — I48.92 ATRIAL FLUTTER, PAROXYSMAL: ICD-10-CM

## 2023-02-06 DIAGNOSIS — I48.0 PAF (PAROXYSMAL ATRIAL FIBRILLATION): ICD-10-CM

## 2023-02-06 DIAGNOSIS — Z12.11 COLON CANCER SCREENING: ICD-10-CM

## 2023-02-06 DIAGNOSIS — Z00.00 ANNUAL PHYSICAL EXAM: Primary | ICD-10-CM

## 2023-02-06 DIAGNOSIS — Z12.5 ENCOUNTER FOR PROSTATE CANCER SCREENING: ICD-10-CM

## 2023-02-06 DIAGNOSIS — Z00.00 ANNUAL PHYSICAL EXAM: ICD-10-CM

## 2023-02-06 LAB
BASOPHILS # BLD AUTO: 0.04 K/UL (ref 0–0.2)
BASOPHILS NFR BLD: 0.7 % (ref 0–1.9)
CHOLEST SERPL-MCNC: 201 MG/DL (ref 120–199)
CHOLEST/HDLC SERPL: 4.4 {RATIO} (ref 2–5)
COMPLEXED PSA SERPL-MCNC: 0.41 NG/ML (ref 0–4)
DIFFERENTIAL METHOD: ABNORMAL
EOSINOPHIL # BLD AUTO: 0 K/UL (ref 0–0.5)
EOSINOPHIL NFR BLD: 0.7 % (ref 0–8)
ERYTHROCYTE [DISTWIDTH] IN BLOOD BY AUTOMATED COUNT: 13.2 % (ref 11.5–14.5)
HCT VFR BLD AUTO: 43.6 % (ref 40–54)
HDLC SERPL-MCNC: 46 MG/DL (ref 40–75)
HDLC SERPL: 22.9 % (ref 20–50)
HGB BLD-MCNC: 14.5 G/DL (ref 14–18)
IMM GRANULOCYTES # BLD AUTO: 0.01 K/UL (ref 0–0.04)
IMM GRANULOCYTES NFR BLD AUTO: 0.2 % (ref 0–0.5)
LDLC SERPL CALC-MCNC: 143 MG/DL (ref 63–159)
LYMPHOCYTES # BLD AUTO: 1.9 K/UL (ref 1–4.8)
LYMPHOCYTES NFR BLD: 35.1 % (ref 18–48)
MCH RBC QN AUTO: 32.6 PG (ref 27–31)
MCHC RBC AUTO-ENTMCNC: 33.3 G/DL (ref 32–36)
MCV RBC AUTO: 98 FL (ref 82–98)
MONOCYTES # BLD AUTO: 0.5 K/UL (ref 0.3–1)
MONOCYTES NFR BLD: 9.2 % (ref 4–15)
NEUTROPHILS # BLD AUTO: 2.9 K/UL (ref 1.8–7.7)
NEUTROPHILS NFR BLD: 54.1 % (ref 38–73)
NONHDLC SERPL-MCNC: 155 MG/DL
NRBC BLD-RTO: 0 /100 WBC
PLATELET # BLD AUTO: 222 K/UL (ref 150–450)
PMV BLD AUTO: 11.9 FL (ref 9.2–12.9)
RBC # BLD AUTO: 4.45 M/UL (ref 4.6–6.2)
TRIGL SERPL-MCNC: 60 MG/DL (ref 30–150)
WBC # BLD AUTO: 5.35 K/UL (ref 3.9–12.7)

## 2023-02-06 PROCEDURE — 99999 PR PBB SHADOW E&M-EST. PATIENT-LVL III: ICD-10-PCS | Mod: PBBFAC,,, | Performed by: INTERNAL MEDICINE

## 2023-02-06 PROCEDURE — 84153 ASSAY OF PSA TOTAL: CPT | Performed by: INTERNAL MEDICINE

## 2023-02-06 PROCEDURE — 99999 PR PBB SHADOW E&M-EST. PATIENT-LVL III: CPT | Mod: PBBFAC,,, | Performed by: INTERNAL MEDICINE

## 2023-02-06 PROCEDURE — 85025 COMPLETE CBC W/AUTO DIFF WBC: CPT | Performed by: INTERNAL MEDICINE

## 2023-02-06 PROCEDURE — 3079F DIAST BP 80-89 MM HG: CPT | Mod: CPTII,S$GLB,, | Performed by: INTERNAL MEDICINE

## 2023-02-06 PROCEDURE — 1160F PR REVIEW ALL MEDS BY PRESCRIBER/CLIN PHARMACIST DOCUMENTED: ICD-10-PCS | Mod: CPTII,S$GLB,, | Performed by: INTERNAL MEDICINE

## 2023-02-06 PROCEDURE — 3074F SYST BP LT 130 MM HG: CPT | Mod: CPTII,S$GLB,, | Performed by: INTERNAL MEDICINE

## 2023-02-06 PROCEDURE — 3008F BODY MASS INDEX DOCD: CPT | Mod: CPTII,S$GLB,, | Performed by: INTERNAL MEDICINE

## 2023-02-06 PROCEDURE — 3074F PR MOST RECENT SYSTOLIC BLOOD PRESSURE < 130 MM HG: ICD-10-PCS | Mod: CPTII,S$GLB,, | Performed by: INTERNAL MEDICINE

## 2023-02-06 PROCEDURE — 3008F PR BODY MASS INDEX (BMI) DOCUMENTED: ICD-10-PCS | Mod: CPTII,S$GLB,, | Performed by: INTERNAL MEDICINE

## 2023-02-06 PROCEDURE — 1160F RVW MEDS BY RX/DR IN RCRD: CPT | Mod: CPTII,S$GLB,, | Performed by: INTERNAL MEDICINE

## 2023-02-06 PROCEDURE — 1159F MED LIST DOCD IN RCRD: CPT | Mod: CPTII,S$GLB,, | Performed by: INTERNAL MEDICINE

## 2023-02-06 PROCEDURE — 3079F PR MOST RECENT DIASTOLIC BLOOD PRESSURE 80-89 MM HG: ICD-10-PCS | Mod: CPTII,S$GLB,, | Performed by: INTERNAL MEDICINE

## 2023-02-06 PROCEDURE — 81001 URINALYSIS AUTO W/SCOPE: CPT | Performed by: INTERNAL MEDICINE

## 2023-02-06 PROCEDURE — 80061 LIPID PANEL: CPT | Performed by: INTERNAL MEDICINE

## 2023-02-06 PROCEDURE — 99396 PR PREVENTIVE VISIT,EST,40-64: ICD-10-PCS | Mod: S$GLB,,, | Performed by: INTERNAL MEDICINE

## 2023-02-06 PROCEDURE — 99396 PREV VISIT EST AGE 40-64: CPT | Mod: S$GLB,,, | Performed by: INTERNAL MEDICINE

## 2023-02-06 PROCEDURE — 36415 COLL VENOUS BLD VENIPUNCTURE: CPT | Performed by: INTERNAL MEDICINE

## 2023-02-06 PROCEDURE — 1159F PR MEDICATION LIST DOCUMENTED IN MEDICAL RECORD: ICD-10-PCS | Mod: CPTII,S$GLB,, | Performed by: INTERNAL MEDICINE

## 2023-02-07 LAB
BILIRUB UR QL STRIP: NEGATIVE
CLARITY UR REFRACT.AUTO: CLEAR
COLOR UR AUTO: YELLOW
GLUCOSE UR QL STRIP: NEGATIVE
HGB UR QL STRIP: ABNORMAL
HYALINE CASTS UR QL AUTO: 1 /LPF
KETONES UR QL STRIP: ABNORMAL
LEUKOCYTE ESTERASE UR QL STRIP: NEGATIVE
MICROSCOPIC COMMENT: NORMAL
NITRITE UR QL STRIP: NEGATIVE
PH UR STRIP: 5 [PH] (ref 5–8)
PROT UR QL STRIP: NEGATIVE
RBC #/AREA URNS AUTO: 4 /HPF (ref 0–4)
SP GR UR STRIP: 1.02 (ref 1–1.03)
URN SPEC COLLECT METH UR: ABNORMAL
WBC #/AREA URNS AUTO: 1 /HPF (ref 0–5)

## 2023-02-13 ENCOUNTER — TELEPHONE (OUTPATIENT)
Dept: ELECTROPHYSIOLOGY | Facility: CLINIC | Age: 58
End: 2023-02-13
Payer: COMMERCIAL

## 2023-02-13 DIAGNOSIS — I48.0 PAROXYSMAL ATRIAL FIBRILLATION: Primary | ICD-10-CM

## 2023-02-13 RX ORDER — METOPROLOL SUCCINATE 25 MG/1
25 TABLET, EXTENDED RELEASE ORAL DAILY
Qty: 30 TABLET | Refills: 11 | Status: SHIPPED | OUTPATIENT
Start: 2023-02-13 | End: 2023-02-13

## 2023-02-13 RX ORDER — METOPROLOL SUCCINATE 25 MG/1
25 TABLET, EXTENDED RELEASE ORAL DAILY
Qty: 30 TABLET | Refills: 11 | Status: SHIPPED | OUTPATIENT
Start: 2023-02-13 | End: 2023-06-20 | Stop reason: ALTCHOICE

## 2023-02-13 NOTE — TELEPHONE ENCOUNTER
Spoke with patient regarding his monitor results. Discussed moving forward with redo-PVI and atypical AFL ablation. He elects to proceed. Reports he has mostly been in sinus rhythm the last 5 days.    Plan  Redo-PVI, atypical AFL ablation (will try to induce)  Carto  Anesthesia  RADHIKA day of, cancel if in sinus  Protonix 40mg daily 2 weeks prior and continue for 4 weeks after  Ok to take xarelto evening prior  Hold metoprolol for 5 days prior.    Patient will let me know in the meantime if he persists in an episodes.

## 2023-02-13 NOTE — TELEPHONE ENCOUNTER
Spoke with patient to discuss procedure scheduling, however patient states that he doesn't have his calender with him and will call back tomorrow to discuss procedure scheduling.

## 2023-02-15 ENCOUNTER — PATIENT MESSAGE (OUTPATIENT)
Dept: ELECTROPHYSIOLOGY | Facility: CLINIC | Age: 58
End: 2023-02-15
Payer: COMMERCIAL

## 2023-02-16 ENCOUNTER — PATIENT MESSAGE (OUTPATIENT)
Dept: ELECTROPHYSIOLOGY | Facility: CLINIC | Age: 58
End: 2023-02-16
Payer: COMMERCIAL

## 2023-02-20 ENCOUNTER — TELEPHONE (OUTPATIENT)
Dept: ELECTROPHYSIOLOGY | Facility: CLINIC | Age: 58
End: 2023-02-20
Payer: COMMERCIAL

## 2023-02-20 NOTE — TELEPHONE ENCOUNTER
Late Entry :  Spoke with Patient and scheduled for PVI redo procedure on 4/28/2023 ( date per patient request) with Dr Shell. Procedure details reviewed and advised that pre procedure patient instructions will be sent via patient portal as requested. Advised to call the office for any questions or concerns prior to scheduled procedure. Understanding verbalized.

## 2023-02-27 NOTE — PROGRESS NOTES
Mr. Sofia is a patient of Dr. Shell and was last seen in clinic 8/24/2022.      Subjective:   Patient ID:  Jhonatan Sofia is a 58 y.o. male who presents for follow-up of Atrial Fibrillation  .     HPI:    Mr. Sofia is a 58 y.o. male with HTN, AF/AFL (PVI and CTI-line 1/12/2022) here for follow up.    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 He works at [employer] and gets yearly on-site physical exams with electrocardiograms. He reports they have been normal until this past March of 2020 when he was diagnosed with atrial fibrillation. He had no obvious symptoms however his wife and daughter report some increased fatigue that they have noted. He was seen by Dr. Sanchez who referred him to general cardiology. Low dose metoprolol and eliquis were prescribed and he was referred for further evaluation. He has yet to start eliquis.    He exercises regularly with heart rate monitoring and notes at times he sustains his heart rate in the 180s-200 bpm range. He does not use exercise supplements, does not drink alcohol, and wife reports no symptoms consistent with sleep apnea. He had an exercise stress echocardiogram in 2017 which noted no ischemia and showed a structurally normal left ventricle with mild left atrial enlargement (LVEF 55%). A recent echocardiogram noted a LVEF of 50% with a severely enlarged left atrium. A recent TSH lab test indicated a euthyroid state.    Available electrocardiograms in Epic which show normal sinus rhythm until an ECG dated 5/14/2020 which showed atrial fibrillation with controlled ventricular response.    His [id number] score is 1 and we discussed that anticoagulation long-term is a risk-benefit shared decision analysis. I also discussed the goal to reduce symptomatic arrhythmic episodes by pharmacologic and/or procedural methods and utilizing a rhythm versus a rate control strategy.  He may have latent symptoms per his family (fatigue). Discussed regardless at his age and activity level as well as this being the first documented event I strongly recommend an attempt at restoration of sinus rhythm to assess symptomatic response. He understood and agreed. Discussed need to take eliquis prior and 4 weeks after cardioversion. He can then decide on whether he wants to continue it long-term. If needed would use flecainide for rhythm control. Discussed risk atrial fibrillation in high-endurance athletes and advised to limit his exercise effort to maintain acceptable age-related exercise heart rate.     8/6/2020: Cardioversion was successfully performed with restoration of normal sinus rhythm.    Spoke with Mr. Sofia regarding his recurrent AF after his DCCV. He reports he felt great for ~5 days after DCCV and then went back into AF. Discussed starting an anti-arrhythmic drug and then repeating a DCCV. He stated he would like to do that. 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 Will start flecainide 100mg bid Sunday and have my nurse schedule an outpatient DCCV Wednesday or Thursday. He should continue eliquis and metoprolol. He has not missed any eliquis and thus will defer RADHIKA.    10/6/2020: He underwent cardioversion 8/6 with symptom improvement. Continued to have AF afterward and was placed on flecainide. In SR when presented for subsequent DCCV which was aborted. He is here for follow up.   He continues to feel better in SR. Has had 2 breakthrough episodes when he delayed his medications. One lasting several hours. Given his young age and symptomatic breakthrough AF despite flecainide, he would likely be a good candidate for PVI. (UPDATE: Dr. Shell confirms good PVI candidate). Risks, benefits, and alternatives discussed with patient. He is interested in proceeding but cannot fit it into his schedule until after the new year. Will have patient RTC in 3 mo to  confirm. In interim, continue flecainide. He has chronic bradycardia and is asymptomatic, but skips his metoprolol some days if his BP is low. Will reduce to 12.5mg toprol. He remains on eliquis for CVA prophylaxis (CHADSVASc 1).    4/14/2021: Overall he feels he is well controlled on flecainide. Only identifies breakthrough when he misses or delays meds. Does notice fast HR while exercising - likely SR given gradual trend on monitor. He would prefer to continue current regimen for now if it is safe. EKG with stable intervals. Update echo to confirm stability of heart function. Plan for PVI if EF is down or he identifies consistent AF breakthrough despite flecainide. CHADSVASc 1 and he remains on eliquis.    11/29/2021: Pt is out of rhythm today despite flecainide. He has had multiple AF breakthrough episodes. No overt symptoms but he does have a history of fatigue with AF and given his young age, rhythm control is recommended. PVI previously recommended by Dr. Shell. We discussed risks, benefits, and alternatives to PVI. Pt agrees to proceed. In interim will obtain Holter to determine whether his arrhythmia is paroxysmal or persistent. If persistent - will stop flecainide now. CHADSVASC 1 on eliquis.    1/12/2022:  Successful CTI ablation. Successful pulmonary vein RF ablation.    3/16/2022: Pt is 2 mo s/p PVI and CTI-line. He is doing well from a rhythm standpoint, with no documented or symptomatic recurrence of arrhythmia since procedure. He does report more activity tolerance. Never felt palpitations. Feels well.  Reduce flecainide to 50mg BID. CHADSVASc 1 on xarelto.     8/24/2022: He is now 7 months s/p PVI and CTI-line. Continues to feel well, with no documented or symptomatic recurrence of arrhythmia since procedure.  Has restarted exercise without any limitations in activity tolerance. Possible junctional rhythm on EKG reverting to SR on rhythm strip. Asymptomatic.  Will stop flecainide now and recheck EKG  in 4 weeks. If WNL, RTC 6 mo. CHADSVASc 1 on xarelto. We discussed AF monitoring at home.    Update (02/28/2023):    1/26/2023: He is in an atypical AFL, appears rate controlled. He desires redo ablation. Recommend continuing with extended holter for now to ensure he is rate controlled    Monitor 2/10/2023: Paroxysmal atrial fibrillation and flutter. RVR most noted with atrial flutter. AF burden ~78%.    Scheduled for PVI redo procedure on 4/28/2023.     Today he says he feels his heart racing when lying in bed. Otherwise been feeling well recently. Still exercising. Was exercising while wearing monitor. No LH, syncope.     He is currently taking xarelto 20mg daily for stroke prophylaxis and denies significant bleeding episodes. He is currently being treated with toprol 25mg daily for  for HR control.  Kidney function is stable, with a creatinine of 1.2 on 1/25/2023.    I have personally reviewed the patient's EKG today, which shows sinus bradycardia at 48bpm. MD interval is 180. QRS is 90. QT is 448.    Relevant Cardiac Test Results:    2D Echo (5/12/2021):  The left ventricle is normal in size with normal systolic function.  The estimated ejection fraction is 63%.  Normal left ventricular diastolic function.  Severe left atrial enlargement.  Normal right ventricular size with normal right ventricular systolic function.  Moderate right atrial enlargement.  Mild mitral regurgitation.  Mild tricuspid regurgitation.  Normal central venous pressure (3 mmHg).  The estimated PA systolic pressure is 28 mmHg.  Trivial pericardial effusion. Outside the RA.    Current Outpatient Medications   Medication Sig    metoprolol succinate (TOPROL-XL) 25 MG 24 hr tablet Take 1 tablet (25 mg total) by mouth once daily.    XARELTO 20 mg Tab TAKE 1 TABLET(20 MG) BY MOUTH EVERY EVENING WITH DINNER     No current facility-administered medications for this visit.     Facility-Administered Medications Ordered in Other Visits   Medication     "sodium chloride 0.9% bolus 1,000 mL    sodium chloride 0.9% bolus 1,000 mL       Review of Systems   Constitutional: Negative for malaise/fatigue.   Cardiovascular:  Positive for palpitations. Negative for chest pain, dyspnea on exertion, irregular heartbeat and leg swelling.   Respiratory:  Negative for shortness of breath.    Hematologic/Lymphatic: Negative for bleeding problem.   Skin:  Negative for rash.   Musculoskeletal:  Negative for myalgias.   Gastrointestinal:  Negative for hematemesis, hematochezia and nausea.   Genitourinary:  Negative for hematuria.   Neurological:  Negative for light-headedness.   Psychiatric/Behavioral:  Negative for altered mental status.    Allergic/Immunologic: Negative for persistent infections.     Objective:          BP (!) 142/86   Pulse (!) 48   Ht 6' 1" (1.854 m)   Wt 105.9 kg (233 lb 7.5 oz)   BMI 30.80 kg/m²     Physical Exam  Vitals and nursing note reviewed.   Constitutional:       Appearance: Normal appearance. He is well-developed.   HENT:      Head: Normocephalic.      Nose: Nose normal.   Eyes:      Pupils: Pupils are equal, round, and reactive to light.   Cardiovascular:      Rate and Rhythm: Regular rhythm. Bradycardia present.   Pulmonary:      Effort: No respiratory distress.      Breath sounds: Normal breath sounds.   Musculoskeletal:         General: Normal range of motion.   Skin:     General: Skin is warm and dry.      Findings: No erythema.   Neurological:      Mental Status: He is alert and oriented to person, place, and time.   Psychiatric:         Speech: Speech normal.         Behavior: Behavior normal.         Lab Results   Component Value Date     01/25/2023    K 4.1 01/25/2023    MG 1.9 01/25/2023    BUN 28 (H) 01/25/2023    CREATININE 1.2 01/25/2023    ALT 26 01/25/2023    AST 23 01/25/2023    HGB 14.5 02/06/2023    HCT 43.6 02/06/2023    TSH 3.410 01/25/2023    LDLCALC 143.0 02/06/2023       Recent Labs   Lab 07/30/20  1630 01/05/22  1558 "   INR 1.0 0.9         Assessment:     1. Paroxysmal atrial fibrillation    2. Anticoagulant long-term use    3. History of cardioversion    4. History of radiofrequency ablation (RFA) procedure for cardiac arrhythmia    5. Essential hypertension      Plan:     In summary, Mr. Sofia is a 58 y.o. male with HTN, AF/AFL (PVI and CTI-line 1/12/2022) here for follow up.  Pt has recurrence of AF and atypical AFL. Plan is for redo PVI with atypical AFL ablation scheduled for 4/28/2023. Pt wore monitor that showed 78% AF/AFL burden, average V rates 91bpm. He is jody when in sinus and converted to sinus at the end of the monitor. Will make no changes to his metoprolol. He remains on xarelto for CVA prophylaxis. We reviewed upcoming procedure. He is ready to proceed and had no additional questions.    Redo PVI/atypical AFL as scheduled  Continue current meds  RTC as scheduled following procedure    *A copy of this note has been sent to Dr. Shell*    Follow up as scheduled following procedure.    ------------------------------------------------------------------    GARO Moya, NP-C  Cardiac Electrophysiology

## 2023-02-28 ENCOUNTER — OFFICE VISIT (OUTPATIENT)
Dept: ELECTROPHYSIOLOGY | Facility: CLINIC | Age: 58
End: 2023-02-28
Payer: COMMERCIAL

## 2023-02-28 VITALS
HEIGHT: 73 IN | DIASTOLIC BLOOD PRESSURE: 86 MMHG | WEIGHT: 233.44 LBS | BODY MASS INDEX: 30.94 KG/M2 | HEART RATE: 48 BPM | SYSTOLIC BLOOD PRESSURE: 142 MMHG

## 2023-02-28 DIAGNOSIS — I48.0 PAROXYSMAL ATRIAL FIBRILLATION: Primary | ICD-10-CM

## 2023-02-28 DIAGNOSIS — Z92.89 HISTORY OF CARDIOVERSION: ICD-10-CM

## 2023-02-28 DIAGNOSIS — I49.8 OTHER SPECIFIED CARDIAC ARRHYTHMIAS: ICD-10-CM

## 2023-02-28 DIAGNOSIS — Z98.890 HISTORY OF RADIOFREQUENCY ABLATION (RFA) PROCEDURE FOR CARDIAC ARRHYTHMIA: ICD-10-CM

## 2023-02-28 DIAGNOSIS — Z79.01 ANTICOAGULANT LONG-TERM USE: ICD-10-CM

## 2023-02-28 DIAGNOSIS — I10 ESSENTIAL HYPERTENSION: ICD-10-CM

## 2023-02-28 PROCEDURE — 1159F PR MEDICATION LIST DOCUMENTED IN MEDICAL RECORD: ICD-10-PCS | Mod: CPTII,S$GLB,, | Performed by: NURSE PRACTITIONER

## 2023-02-28 PROCEDURE — 3079F PR MOST RECENT DIASTOLIC BLOOD PRESSURE 80-89 MM HG: ICD-10-PCS | Mod: CPTII,S$GLB,, | Performed by: NURSE PRACTITIONER

## 2023-02-28 PROCEDURE — 3079F DIAST BP 80-89 MM HG: CPT | Mod: CPTII,S$GLB,, | Performed by: NURSE PRACTITIONER

## 2023-02-28 PROCEDURE — 3008F BODY MASS INDEX DOCD: CPT | Mod: CPTII,S$GLB,, | Performed by: NURSE PRACTITIONER

## 2023-02-28 PROCEDURE — 99999 PR PBB SHADOW E&M-EST. PATIENT-LVL III: ICD-10-PCS | Mod: PBBFAC,,, | Performed by: NURSE PRACTITIONER

## 2023-02-28 PROCEDURE — 93005 RHYTHM STRIP: ICD-10-PCS | Mod: S$GLB,,, | Performed by: INTERNAL MEDICINE

## 2023-02-28 PROCEDURE — 3077F PR MOST RECENT SYSTOLIC BLOOD PRESSURE >= 140 MM HG: ICD-10-PCS | Mod: CPTII,S$GLB,, | Performed by: NURSE PRACTITIONER

## 2023-02-28 PROCEDURE — 93005 ELECTROCARDIOGRAM TRACING: CPT | Mod: S$GLB,,, | Performed by: INTERNAL MEDICINE

## 2023-02-28 PROCEDURE — 99999 PR PBB SHADOW E&M-EST. PATIENT-LVL III: CPT | Mod: PBBFAC,,, | Performed by: NURSE PRACTITIONER

## 2023-02-28 PROCEDURE — 1159F MED LIST DOCD IN RCRD: CPT | Mod: CPTII,S$GLB,, | Performed by: NURSE PRACTITIONER

## 2023-02-28 PROCEDURE — 93010 ELECTROCARDIOGRAM REPORT: CPT | Mod: S$GLB,,, | Performed by: INTERNAL MEDICINE

## 2023-02-28 PROCEDURE — 93010 RHYTHM STRIP: ICD-10-PCS | Mod: S$GLB,,, | Performed by: INTERNAL MEDICINE

## 2023-02-28 PROCEDURE — 3008F PR BODY MASS INDEX (BMI) DOCUMENTED: ICD-10-PCS | Mod: CPTII,S$GLB,, | Performed by: NURSE PRACTITIONER

## 2023-02-28 PROCEDURE — 1160F PR REVIEW ALL MEDS BY PRESCRIBER/CLIN PHARMACIST DOCUMENTED: ICD-10-PCS | Mod: CPTII,S$GLB,, | Performed by: NURSE PRACTITIONER

## 2023-02-28 PROCEDURE — 99214 OFFICE O/P EST MOD 30 MIN: CPT | Mod: S$GLB,,, | Performed by: NURSE PRACTITIONER

## 2023-02-28 PROCEDURE — 3077F SYST BP >= 140 MM HG: CPT | Mod: CPTII,S$GLB,, | Performed by: NURSE PRACTITIONER

## 2023-02-28 PROCEDURE — 99214 PR OFFICE/OUTPT VISIT, EST, LEVL IV, 30-39 MIN: ICD-10-PCS | Mod: S$GLB,,, | Performed by: NURSE PRACTITIONER

## 2023-02-28 PROCEDURE — 1160F RVW MEDS BY RX/DR IN RCRD: CPT | Mod: CPTII,S$GLB,, | Performed by: NURSE PRACTITIONER

## 2023-03-22 DIAGNOSIS — I48.0 PAROXYSMAL ATRIAL FIBRILLATION: Primary | ICD-10-CM

## 2023-03-22 DIAGNOSIS — I10 ESSENTIAL HYPERTENSION: ICD-10-CM

## 2023-03-22 DIAGNOSIS — I48.4 ATYPICAL ATRIAL FLUTTER: ICD-10-CM

## 2023-03-22 DIAGNOSIS — Z79.01 ANTICOAGULANT LONG-TERM USE: ICD-10-CM

## 2023-03-22 DIAGNOSIS — Z01.818 PRE-OP TESTING: ICD-10-CM

## 2023-03-22 DIAGNOSIS — Z98.890 HISTORY OF RADIOFREQUENCY ABLATION (RFA) PROCEDURE FOR CARDIAC ARRHYTHMIA: ICD-10-CM

## 2023-03-27 ENCOUNTER — PATIENT MESSAGE (OUTPATIENT)
Dept: ELECTROPHYSIOLOGY | Facility: CLINIC | Age: 58
End: 2023-03-27
Payer: COMMERCIAL

## 2023-04-04 ENCOUNTER — PATIENT MESSAGE (OUTPATIENT)
Dept: ELECTROPHYSIOLOGY | Facility: CLINIC | Age: 58
End: 2023-04-04
Payer: COMMERCIAL

## 2023-04-06 RX ORDER — PANTOPRAZOLE SODIUM 40 MG/1
40 TABLET, DELAYED RELEASE ORAL DAILY
Qty: 90 TABLET | Refills: 0 | Status: SHIPPED | OUTPATIENT
Start: 2023-04-06 | End: 2023-06-20

## 2023-04-21 ENCOUNTER — LAB VISIT (OUTPATIENT)
Dept: LAB | Facility: HOSPITAL | Age: 58
End: 2023-04-21
Attending: INTERNAL MEDICINE
Payer: COMMERCIAL

## 2023-04-21 DIAGNOSIS — I48.0 PAROXYSMAL ATRIAL FIBRILLATION: ICD-10-CM

## 2023-04-21 DIAGNOSIS — Z79.01 ANTICOAGULANT LONG-TERM USE: ICD-10-CM

## 2023-04-21 DIAGNOSIS — I48.4 ATYPICAL ATRIAL FLUTTER: ICD-10-CM

## 2023-04-21 DIAGNOSIS — Z01.818 PRE-OP TESTING: ICD-10-CM

## 2023-04-21 DIAGNOSIS — I10 ESSENTIAL HYPERTENSION: ICD-10-CM

## 2023-04-21 DIAGNOSIS — Z98.890 HISTORY OF RADIOFREQUENCY ABLATION (RFA) PROCEDURE FOR CARDIAC ARRHYTHMIA: ICD-10-CM

## 2023-04-21 LAB
ANION GAP SERPL CALC-SCNC: 9 MMOL/L (ref 8–16)
APTT PPP: 47.8 SEC (ref 21–32)
BASOPHILS # BLD AUTO: 0.04 K/UL (ref 0–0.2)
BASOPHILS NFR BLD: 0.7 % (ref 0–1.9)
BUN SERPL-MCNC: 17 MG/DL (ref 6–20)
CALCIUM SERPL-MCNC: 9.7 MG/DL (ref 8.7–10.5)
CHLORIDE SERPL-SCNC: 106 MMOL/L (ref 95–110)
CO2 SERPL-SCNC: 26 MMOL/L (ref 23–29)
CREAT SERPL-MCNC: 1.1 MG/DL (ref 0.5–1.4)
DIFFERENTIAL METHOD: ABNORMAL
EOSINOPHIL # BLD AUTO: 0.1 K/UL (ref 0–0.5)
EOSINOPHIL NFR BLD: 1.8 % (ref 0–8)
ERYTHROCYTE [DISTWIDTH] IN BLOOD BY AUTOMATED COUNT: 13.4 % (ref 11.5–14.5)
EST. GFR  (NO RACE VARIABLE): >60 ML/MIN/1.73 M^2
GLUCOSE SERPL-MCNC: 103 MG/DL (ref 70–110)
HCT VFR BLD AUTO: 41.9 % (ref 40–54)
HGB BLD-MCNC: 13.7 G/DL (ref 14–18)
IMM GRANULOCYTES # BLD AUTO: 0.01 K/UL (ref 0–0.04)
IMM GRANULOCYTES NFR BLD AUTO: 0.2 % (ref 0–0.5)
INR PPP: 1.3 (ref 0.8–1.2)
LYMPHOCYTES # BLD AUTO: 1.8 K/UL (ref 1–4.8)
LYMPHOCYTES NFR BLD: 32.7 % (ref 18–48)
MCH RBC QN AUTO: 31.8 PG (ref 27–31)
MCHC RBC AUTO-ENTMCNC: 32.7 G/DL (ref 32–36)
MCV RBC AUTO: 97 FL (ref 82–98)
MONOCYTES # BLD AUTO: 0.6 K/UL (ref 0.3–1)
MONOCYTES NFR BLD: 10.3 % (ref 4–15)
NEUTROPHILS # BLD AUTO: 3 K/UL (ref 1.8–7.7)
NEUTROPHILS NFR BLD: 54.3 % (ref 38–73)
NRBC BLD-RTO: 0 /100 WBC
PLATELET # BLD AUTO: 212 K/UL (ref 150–450)
PMV BLD AUTO: 10.6 FL (ref 9.2–12.9)
POTASSIUM SERPL-SCNC: 4.5 MMOL/L (ref 3.5–5.1)
PROTHROMBIN TIME: 13.4 SEC (ref 9–12.5)
RBC # BLD AUTO: 4.31 M/UL (ref 4.6–6.2)
SODIUM SERPL-SCNC: 141 MMOL/L (ref 136–145)
WBC # BLD AUTO: 5.53 K/UL (ref 3.9–12.7)

## 2023-04-21 PROCEDURE — 85025 COMPLETE CBC W/AUTO DIFF WBC: CPT | Performed by: INTERNAL MEDICINE

## 2023-04-21 PROCEDURE — 80048 BASIC METABOLIC PNL TOTAL CA: CPT | Performed by: INTERNAL MEDICINE

## 2023-04-21 PROCEDURE — 85610 PROTHROMBIN TIME: CPT | Performed by: INTERNAL MEDICINE

## 2023-04-21 PROCEDURE — 36415 COLL VENOUS BLD VENIPUNCTURE: CPT | Performed by: INTERNAL MEDICINE

## 2023-04-21 PROCEDURE — 85730 THROMBOPLASTIN TIME PARTIAL: CPT | Performed by: INTERNAL MEDICINE

## 2023-04-26 ENCOUNTER — TELEPHONE (OUTPATIENT)
Dept: ELECTROPHYSIOLOGY | Facility: CLINIC | Age: 58
End: 2023-04-26
Payer: COMMERCIAL

## 2023-04-26 NOTE — LETTER
April 4, 2023    Jhonatan Sofia  [address] [place] [address]             Patient Instructions  PVI (Pulmonary Vein Isolation) Ablation   RADHIKA (Transesophageal Echocardiogram)      Personal Assistance  The [facility] pre-services team will be submitting to your medical insurance carrier for authorization. However, sometimes there are financial obligations such as co-pays, out of pocket deductibles, and/or payments required.  It is the patient's responsibility to assure their procedure is financially cleared in advance.  Our expert financial counselors are available to provide patients with assistance for personalized cost estimates.  Please reach out to determine if you have a financial responsibility with regards to your procedures.   Call [phone] to speak with an [facility] financial counselor   Live Chat- accessed through [email or web address] or the [facility] patient portal   Email- request a personalized estimate through email at [email or web address]  MyChart messaging- accessed through the [facility] patient portal        Pre-Procedure Testing Needed for Ablation Procedure:       4/21/2023 at 9:00 am - Blood Work  [facility]  [address] [facility] [facility] [address]    · You do NOT need to fast for this blood work.        **Prior to your procedure please check your skin and groin area thoroughly for any signs or symptoms of infection such as rashes, open sores, yeast infection, or heat rashes.  If you have any concerns with your skin you should be evaluated by your Primary Care Provider or an Urgent Care for treatment prior to your procedure. Your procedure will be cancelled if not treated prior to the day of your procedure.**          Important Medication Information for your ABLATION procedure:        ·  4/14/2023 - (Two weeks prior to your procedure) -  Start taking Protonix ( Pantoprazole) 40 mg daily. This medication should be taken two weeks prior to  your procedure and continued for 30 days after your procedure date.  A prescription for this medication has been sent to your preferred pharmacy [id number] - [place] [place] - [address] AT [address] [address] [facility]    · HOLD (do NOT take) Metoprolol  5  days prior to your procedure.  Your last dose should be taken on 4/22/2023.      · Continue to take your Xarelto every evening with dinner as prescribed. There will be no interruption of your Xarelto prior to your procedure. Your last dose of Xarelto should be taken as scheduled, the evening prior to your procedure on 4/27/2023.        · You may take your other usual morning medications with a sip of water on the day of your procedure.         Day of Ablation Procedure:    4/28/2023  Arrival Time - 5:15 am   ( Arrival times are subject to change. You will be contacted 1-2 days prior to your procedure day to confirm arrival time)      [facility]  [address] [place] [address]    Please report to Cardiology Waiting Room on the 3rd floor of the Hospital,    · Do not eat or drink anything after 12 midnight on the night before your procedure. You should be fasting upon arrival to the hospital on the day of your procedure.   · Please follow above important medication instructions.  · Please do not wear makeup, especially mascara, when arriving for your procedure.  · You may be spending the night in the hospital the night of your procedure. This will be determined by your physician on the day of your procedure.  · You will need someone to drive you home from the hospital due to anesthesia (you will be unable to drive for 24 hours).  · If you wear a CPAP or BIPAP machine for sleep apnea, please be sure to bring your machine with you.  · [facility] has moved to an optional mask policy in most areas. If you would like your care team to wear a mask, please don't hesitate to ask.  · All visitors must wait in a socially  "distant manner until a member of the medical team provides an update at the conclusion of the procedure/surgery.  · If you are spending the night, you are allowed to have one support person spend the night with you.   · Your support person should provide the staff with a valid cell phone number so that he or she can receive automated updates.      Directions to Cardiology Waiting Room  If you park in the Parking Garage:  Take elevators to the 2nd floor  Walk up ramp and turn right by Gold Elevators  Take elevator to the 3rd floor  Upon exiting the elevator, turn away from the clinic areas  Walk long sierra around to front of hospital to area with windows overlooking [facility]  Check in at Reception Desk  OR  If family is dropping you off:  Have them drop you off at the front of the Hospital  (Near the ER, where all the flags are hung).  Take the 'E' elevators to the 3rd floor.  Check in at the Reception Desk in the waiting room.      Post Ablation Instructions:     No pushing, pulling, or lifting greater than 5 pounds for ONE week.   No long car rides (greater than 1 hour) for ONE week.  If a long car ride is required, we ask that you stop every hour and walk around for a few minutes.    No driving for 24 hours after procedure due to anesthesia.   Do not soak access site (groin) in water for ONE week (this includes baths, pools, and hot tubs).    Your groin site(s) may be tender and have some bruising.  This is normal.  A small lump less than the size of a quarter or a marble is normal and can last a couple of weeks.   If you notice any swelling, bleeding, or increase in the size of this lump please call us at [phone].    It is OK to go up and down home stairs as needed after ablation procedure.    It is not uncommon to have some breakthrough arrhythmias during the first 90 days after an ablation. We call this the "blanking period". During this time, the scar tissue is maturing and the " inflammation/irritability are settling down. This is not indicative of a failed or unsuccessful procedure. It can and does happen. If it is just short bursts, we will just monitor. If the arrhythmias become prolonged, sometimes we need to make adjustments during this period. This is a common part of the healing process.     Any need to reschedule or cancel procedures, or any questions regarding your procedures should be addressed directly with the Arrhythmia Department Nurses at the following phone number: [phone].

## 2023-04-26 NOTE — TELEPHONE ENCOUNTER
Spoke with patient and advised that date to return to work can be discussed with the physician post procedure. Patient states that he was able to return to work 3 days post ablation last time and was anticipating to be able to do the same thing. Patient instructed that a return to work letter can be provided after his procedure, however if anything is needed prior, with a tentative date to return , that can be provided. Patient will check with his employer and let us know if a letter is needed prior to the procedure. Confirmation of procedure details completed.        CONFIRMED procedure arrival time of 5:15 am on 4/28/2023 for PVI redo ablation with Dr Shell.     Reiterated instructions including:  -Directions to check in desk.    -NPO after midnight night prior to procedure. FASTING upon arrival to the hospital.     -High importance of HOLDING Metoprolol 5 days prior to the procedure. Confirmed last dose taken on 4/22/2023.     -Confirmed compliance of Xarelto as prescribed.     -Pre-procedure LABS reviewed with no alerts noted.     -Confirmed absence or presence of implanted device/stimulator N/A    -Confirmed no recent or current fever, cough, or shortness of breath .    .-Confirmed no redness, rash, irritation, or yeast infection to groin area.     -  -Reviewed current visitor policy    Patient verbalized understanding of above , denied any further questions, and appreciated the call.

## 2023-04-26 NOTE — TELEPHONE ENCOUNTER
----- Message from Lul Reynolds sent at 4/26/2023  1:30 PM CDT -----  Regarding: Light duty  PT called and requested a call back from the nurse in reference to returning to work after procedure.  PT can be reached @ [phone]        Thanks

## 2023-04-27 ENCOUNTER — ANESTHESIA EVENT (OUTPATIENT)
Dept: MEDSURG UNIT | Facility: HOSPITAL | Age: 58
End: 2023-04-27
Payer: COMMERCIAL

## 2023-04-28 ENCOUNTER — ANESTHESIA (OUTPATIENT)
Dept: MEDSURG UNIT | Facility: HOSPITAL | Age: 58
End: 2023-04-28
Payer: COMMERCIAL

## 2023-04-28 ENCOUNTER — HOSPITAL ENCOUNTER (OUTPATIENT)
Facility: HOSPITAL | Age: 58
Discharge: HOME | End: 2023-04-28
Attending: INTERNAL MEDICINE | Admitting: INTERNAL MEDICINE
Payer: COMMERCIAL

## 2023-04-28 VITALS
HEART RATE: 65 BPM | RESPIRATION RATE: 18 BRPM | BODY MASS INDEX: 29.8 KG/M2 | OXYGEN SATURATION: 97 % | DIASTOLIC BLOOD PRESSURE: 72 MMHG | WEIGHT: 220 LBS | HEIGHT: 72 IN | SYSTOLIC BLOOD PRESSURE: 117 MMHG | TEMPERATURE: 98 F

## 2023-04-28 DIAGNOSIS — I48.91 ATRIAL FIBRILLATION: ICD-10-CM

## 2023-04-28 DIAGNOSIS — Z98.890 S/P ABLATION OF ATRIAL FIBRILLATION: ICD-10-CM

## 2023-04-28 DIAGNOSIS — I49.9 ARRHYTHMIA: ICD-10-CM

## 2023-04-28 DIAGNOSIS — Z86.79 S/P ABLATION OF ATRIAL FIBRILLATION: ICD-10-CM

## 2023-04-28 DIAGNOSIS — I48.0 PAROXYSMAL ATRIAL FIBRILLATION: ICD-10-CM

## 2023-04-28 DIAGNOSIS — I48.4 ATYPICAL ATRIAL FLUTTER: ICD-10-CM

## 2023-04-28 DIAGNOSIS — Z98.890 HISTORY OF RADIOFREQUENCY ABLATION (RFA) PROCEDURE FOR CARDIAC ARRHYTHMIA: ICD-10-CM

## 2023-04-28 LAB
POC ACTIVATED CLOTTING TIME K: 161 SEC (ref 74–137)
POC ACTIVATED CLOTTING TIME K: 167 SEC (ref 74–137)
POC ACTIVATED CLOTTING TIME K: 317 SEC (ref 74–137)
POC ACTIVATED CLOTTING TIME K: 335 SEC (ref 74–137)
POC ACTIVATED CLOTTING TIME K: 335 SEC (ref 74–137)
POC ACTIVATED CLOTTING TIME K: 347 SEC (ref 74–137)
POC ACTIVATED CLOTTING TIME K: 354 SEC (ref 74–137)
POC ACTIVATED CLOTTING TIME K: 371 SEC (ref 74–137)
POC ACTIVATED CLOTTING TIME K: 414 SEC (ref 74–137)
SAMPLE: ABNORMAL

## 2023-04-28 PROCEDURE — C1766 INTRO/SHEATH,STRBLE,NON-PEEL: HCPCS | Performed by: INTERNAL MEDICINE

## 2023-04-28 PROCEDURE — 37000009 HC ANESTHESIA EA ADD 15 MINS: Performed by: INTERNAL MEDICINE

## 2023-04-28 PROCEDURE — 93655 PR ABLATE ARRHYTHMIA ADD ON: ICD-10-PCS | Mod: ,,, | Performed by: INTERNAL MEDICINE

## 2023-04-28 PROCEDURE — 37000008 HC ANESTHESIA 1ST 15 MINUTES: Performed by: INTERNAL MEDICINE

## 2023-04-28 PROCEDURE — D9220A PRA ANESTHESIA: ICD-10-PCS | Mod: ANES,,, | Performed by: ANESTHESIOLOGY

## 2023-04-28 PROCEDURE — 93010 EKG 12-LEAD: ICD-10-PCS | Mod: ,,, | Performed by: INTERNAL MEDICINE

## 2023-04-28 PROCEDURE — 63600175 PHARM REV CODE 636 W HCPCS: Performed by: INTERNAL MEDICINE

## 2023-04-28 PROCEDURE — 93656 COMPRE EP EVAL ABLTJ ATR FIB: CPT | Performed by: INTERNAL MEDICINE

## 2023-04-28 PROCEDURE — 27201423 OPTIME MED/SURG SUP & DEVICES STERILE SUPPLY: Performed by: INTERNAL MEDICINE

## 2023-04-28 PROCEDURE — D9220A PRA ANESTHESIA: Mod: ANES,,, | Performed by: ANESTHESIOLOGY

## 2023-04-28 PROCEDURE — 93656 COMPRE EP EVAL ABLTJ ATR FIB: CPT | Mod: ,,, | Performed by: INTERNAL MEDICINE

## 2023-04-28 PROCEDURE — 93657 PR TX L/R ATRIAL FIB ADDL: ICD-10-PCS | Mod: ,,, | Performed by: INTERNAL MEDICINE

## 2023-04-28 PROCEDURE — 93010 ELECTROCARDIOGRAM REPORT: CPT | Mod: ,,, | Performed by: INTERNAL MEDICINE

## 2023-04-28 PROCEDURE — 36620 ARTERIAL: ICD-10-PCS | Mod: ,,, | Performed by: ANESTHESIOLOGY

## 2023-04-28 PROCEDURE — D9220A PRA ANESTHESIA: Mod: CRNA,,, | Performed by: NURSE ANESTHETIST, CERTIFIED REGISTERED

## 2023-04-28 PROCEDURE — 93657 TX L/R ATRIAL FIB ADDL: CPT | Mod: ,,, | Performed by: INTERNAL MEDICINE

## 2023-04-28 PROCEDURE — 93655 ICAR CATH ABLTJ DSCRT ARRHYT: CPT | Performed by: INTERNAL MEDICINE

## 2023-04-28 PROCEDURE — 25000003 PHARM REV CODE 250: Performed by: INTERNAL MEDICINE

## 2023-04-28 PROCEDURE — 27201037 HC PRESSURE MONITORING SET UP

## 2023-04-28 PROCEDURE — C1732 CATH, EP, DIAG/ABL, 3D/VECT: HCPCS | Performed by: INTERNAL MEDICINE

## 2023-04-28 PROCEDURE — C1731 CATH, EP, 20 OR MORE ELEC: HCPCS | Performed by: INTERNAL MEDICINE

## 2023-04-28 PROCEDURE — 25000003 PHARM REV CODE 250: Performed by: NURSE ANESTHETIST, CERTIFIED REGISTERED

## 2023-04-28 PROCEDURE — C1753 CATH, INTRAVAS ULTRASOUND: HCPCS | Performed by: INTERNAL MEDICINE

## 2023-04-28 PROCEDURE — C1730 CATH, EP, 19 OR FEW ELECT: HCPCS | Performed by: INTERNAL MEDICINE

## 2023-04-28 PROCEDURE — 93655 ICAR CATH ABLTJ DSCRT ARRHYT: CPT | Mod: ,,, | Performed by: INTERNAL MEDICINE

## 2023-04-28 PROCEDURE — 93657 TX L/R ATRIAL FIB ADDL: CPT | Performed by: INTERNAL MEDICINE

## 2023-04-28 PROCEDURE — C1894 INTRO/SHEATH, NON-LASER: HCPCS | Performed by: INTERNAL MEDICINE

## 2023-04-28 PROCEDURE — 93005 ELECTROCARDIOGRAM TRACING: CPT | Mod: 59

## 2023-04-28 PROCEDURE — D9220A PRA ANESTHESIA: ICD-10-PCS | Mod: CRNA,,, | Performed by: NURSE ANESTHETIST, CERTIFIED REGISTERED

## 2023-04-28 PROCEDURE — 36620 INSERTION CATHETER ARTERY: CPT | Mod: ,,, | Performed by: ANESTHESIOLOGY

## 2023-04-28 PROCEDURE — 63600175 PHARM REV CODE 636 W HCPCS: Performed by: NURSE ANESTHETIST, CERTIFIED REGISTERED

## 2023-04-28 PROCEDURE — 93656 PR ELECTROPHYS EVAL, COMPREHEN, W/ABLATION OF ATRIAL FIBR: ICD-10-PCS | Mod: ,,, | Performed by: INTERNAL MEDICINE

## 2023-04-28 RX ORDER — FUROSEMIDE 10 MG/ML
20 INJECTION INTRAMUSCULAR; INTRAVENOUS ONCE
Status: COMPLETED | OUTPATIENT
Start: 2023-04-28 | End: 2023-04-28

## 2023-04-28 RX ORDER — PROTAMINE SULFATE 10 MG/ML
INJECTION, SOLUTION INTRAVENOUS
Status: DISCONTINUED | OUTPATIENT
Start: 2023-04-28 | End: 2023-04-28

## 2023-04-28 RX ORDER — LIDOCAINE HYDROCHLORIDE 20 MG/ML
INJECTION, SOLUTION INFILTRATION; PERINEURAL
Status: DISCONTINUED | OUTPATIENT
Start: 2023-04-28 | End: 2023-04-28 | Stop reason: HOSPADM

## 2023-04-28 RX ORDER — ROCURONIUM BROMIDE 10 MG/ML
INJECTION, SOLUTION INTRAVENOUS
Status: DISCONTINUED | OUTPATIENT
Start: 2023-04-28 | End: 2023-04-28

## 2023-04-28 RX ORDER — HYDROMORPHONE HYDROCHLORIDE 1 MG/ML
0.2 INJECTION, SOLUTION INTRAMUSCULAR; INTRAVENOUS; SUBCUTANEOUS EVERY 5 MIN PRN
Status: DISCONTINUED | OUTPATIENT
Start: 2023-04-28 | End: 2023-04-28 | Stop reason: HOSPADM

## 2023-04-28 RX ORDER — FENTANYL CITRATE 50 UG/ML
25 INJECTION, SOLUTION INTRAMUSCULAR; INTRAVENOUS EVERY 5 MIN PRN
Status: DISCONTINUED | OUTPATIENT
Start: 2023-04-28 | End: 2023-04-28 | Stop reason: HOSPADM

## 2023-04-28 RX ORDER — DIPHENHYDRAMINE HYDROCHLORIDE 50 MG/ML
25 INJECTION INTRAMUSCULAR; INTRAVENOUS EVERY 6 HOURS PRN
Status: DISCONTINUED | OUTPATIENT
Start: 2023-04-28 | End: 2023-04-28 | Stop reason: HOSPADM

## 2023-04-28 RX ORDER — SUCRALFATE 1 G/1
1 TABLET ORAL 4 TIMES DAILY
Qty: 120 TABLET | Refills: 0 | Status: SHIPPED | OUTPATIENT
Start: 2023-04-28 | End: 2023-05-28

## 2023-04-28 RX ORDER — SUCCINYLCHOLINE CHLORIDE 20 MG/ML
INJECTION INTRAMUSCULAR; INTRAVENOUS
Status: DISCONTINUED | OUTPATIENT
Start: 2023-04-28 | End: 2023-04-28

## 2023-04-28 RX ORDER — HEPARIN SOD,PORCINE/0.9 % NACL 1000/500ML
INTRAVENOUS SOLUTION INTRAVENOUS
Status: DISCONTINUED | OUTPATIENT
Start: 2023-04-28 | End: 2023-04-28 | Stop reason: HOSPADM

## 2023-04-28 RX ORDER — ONDANSETRON 2 MG/ML
INJECTION INTRAMUSCULAR; INTRAVENOUS
Status: DISCONTINUED | OUTPATIENT
Start: 2023-04-28 | End: 2023-04-28

## 2023-04-28 RX ORDER — ACETAMINOPHEN 325 MG/1
650 TABLET ORAL EVERY 4 HOURS PRN
Status: DISCONTINUED | OUTPATIENT
Start: 2023-04-28 | End: 2023-04-28 | Stop reason: HOSPADM

## 2023-04-28 RX ORDER — FUROSEMIDE 20 MG/1
20 TABLET ORAL DAILY PRN
Qty: 5 TABLET | Refills: 0 | Status: SHIPPED | OUTPATIENT
Start: 2023-04-28 | End: 2023-06-20

## 2023-04-28 RX ORDER — DEXAMETHASONE SODIUM PHOSPHATE 4 MG/ML
INJECTION, SOLUTION INTRA-ARTICULAR; INTRALESIONAL; INTRAMUSCULAR; INTRAVENOUS; SOFT TISSUE
Status: DISCONTINUED | OUTPATIENT
Start: 2023-04-28 | End: 2023-04-28

## 2023-04-28 RX ORDER — SODIUM CHLORIDE 9 MG/ML
INJECTION, SOLUTION INTRAVENOUS CONTINUOUS PRN
Status: DISCONTINUED | OUTPATIENT
Start: 2023-04-28 | End: 2023-04-28

## 2023-04-28 RX ORDER — PHENYLEPHRINE HYDROCHLORIDE 10 MG/ML
INJECTION INTRAVENOUS
Status: DISCONTINUED | OUTPATIENT
Start: 2023-04-28 | End: 2023-04-28

## 2023-04-28 RX ORDER — FENTANYL CITRATE 50 UG/ML
INJECTION, SOLUTION INTRAMUSCULAR; INTRAVENOUS
Status: DISCONTINUED | OUTPATIENT
Start: 2023-04-28 | End: 2023-04-28

## 2023-04-28 RX ORDER — HEPARIN SODIUM 1000 [USP'U]/ML
INJECTION, SOLUTION INTRAVENOUS; SUBCUTANEOUS
Status: DISCONTINUED | OUTPATIENT
Start: 2023-04-28 | End: 2023-04-28

## 2023-04-28 RX ORDER — DEXMEDETOMIDINE HYDROCHLORIDE 100 UG/ML
INJECTION, SOLUTION INTRAVENOUS
Status: DISCONTINUED | OUTPATIENT
Start: 2023-04-28 | End: 2023-04-28

## 2023-04-28 RX ORDER — MIDAZOLAM HYDROCHLORIDE 1 MG/ML
INJECTION INTRAMUSCULAR; INTRAVENOUS
Status: DISCONTINUED | OUTPATIENT
Start: 2023-04-28 | End: 2023-04-28

## 2023-04-28 RX ORDER — ONDANSETRON 2 MG/ML
4 INJECTION INTRAMUSCULAR; INTRAVENOUS ONCE AS NEEDED
Status: DISCONTINUED | OUTPATIENT
Start: 2023-04-28 | End: 2023-04-28 | Stop reason: HOSPADM

## 2023-04-28 RX ORDER — KETAMINE HYDROCHLORIDE 100 MG/ML
INJECTION, SOLUTION INTRAMUSCULAR; INTRAVENOUS
Status: DISCONTINUED | OUTPATIENT
Start: 2023-04-28 | End: 2023-04-28

## 2023-04-28 RX ORDER — PROPOFOL 10 MG/ML
VIAL (ML) INTRAVENOUS
Status: DISCONTINUED | OUTPATIENT
Start: 2023-04-28 | End: 2023-04-28

## 2023-04-28 RX ORDER — HEPARIN SODIUM 10000 [USP'U]/100ML
INJECTION, SOLUTION INTRAVENOUS CONTINUOUS PRN
Status: DISCONTINUED | OUTPATIENT
Start: 2023-04-28 | End: 2023-04-28

## 2023-04-28 RX ADMIN — FUROSEMIDE 20 MG: 10 INJECTION, SOLUTION INTRAMUSCULAR; INTRAVENOUS at 05:04

## 2023-04-28 RX ADMIN — DEXAMETHASONE SODIUM PHOSPHATE 4 MG: 4 INJECTION, SOLUTION INTRAMUSCULAR; INTRAVENOUS at 07:04

## 2023-04-28 RX ADMIN — ROCURONIUM BROMIDE 5 MG: 10 INJECTION, SOLUTION INTRAVENOUS at 07:04

## 2023-04-28 RX ADMIN — HEPARIN SODIUM 15000 UNITS: 1000 INJECTION, SOLUTION INTRAVENOUS; SUBCUTANEOUS at 08:04

## 2023-04-28 RX ADMIN — HEPARIN SODIUM 12 UNITS/KG/HR: 10000 INJECTION, SOLUTION INTRAVENOUS at 08:04

## 2023-04-28 RX ADMIN — KETAMINE HYDROCHLORIDE 10 MG: 100 INJECTION INTRAMUSCULAR; INTRAVENOUS at 07:04

## 2023-04-28 RX ADMIN — ONDANSETRON 4 MG: 2 INJECTION INTRAMUSCULAR; INTRAVENOUS at 11:04

## 2023-04-28 RX ADMIN — HEPARIN SODIUM 3000 UNITS: 1000 INJECTION, SOLUTION INTRAVENOUS; SUBCUTANEOUS at 09:04

## 2023-04-28 RX ADMIN — ACETAMINOPHEN 650 MG: 325 TABLET ORAL at 02:04

## 2023-04-28 RX ADMIN — HEPARIN SODIUM 3000 UNITS: 1000 INJECTION, SOLUTION INTRAVENOUS; SUBCUTANEOUS at 08:04

## 2023-04-28 RX ADMIN — SODIUM CHLORIDE: 0.9 INJECTION, SOLUTION INTRAVENOUS at 07:04

## 2023-04-28 RX ADMIN — KETAMINE HYDROCHLORIDE 20 MG: 100 INJECTION INTRAMUSCULAR; INTRAVENOUS at 07:04

## 2023-04-28 RX ADMIN — KETAMINE HYDROCHLORIDE 10 MG: 100 INJECTION INTRAMUSCULAR; INTRAVENOUS at 09:04

## 2023-04-28 RX ADMIN — PHENYLEPHRINE HYDROCHLORIDE 50 MCG: 10 INJECTION INTRAVENOUS at 08:04

## 2023-04-28 RX ADMIN — DEXMEDETOMIDINE HYDROCHLORIDE 8 MCG: 100 INJECTION, SOLUTION INTRAVENOUS at 11:04

## 2023-04-28 RX ADMIN — DEXMEDETOMIDINE HYDROCHLORIDE 4 MCG: 100 INJECTION, SOLUTION INTRAVENOUS at 11:04

## 2023-04-28 RX ADMIN — FENTANYL CITRATE 100 MCG: 50 INJECTION, SOLUTION INTRAMUSCULAR; INTRAVENOUS at 07:04

## 2023-04-28 RX ADMIN — SODIUM CHLORIDE 0.25 MCG/KG/MIN: 9 INJECTION, SOLUTION INTRAVENOUS at 08:04

## 2023-04-28 RX ADMIN — HEPARIN SODIUM 3000 UNITS: 1000 INJECTION, SOLUTION INTRAVENOUS; SUBCUTANEOUS at 11:04

## 2023-04-28 RX ADMIN — PROPOFOL 150 MG: 10 INJECTION, EMULSION INTRAVENOUS at 07:04

## 2023-04-28 RX ADMIN — FENTANYL CITRATE 50 MCG: 50 INJECTION, SOLUTION INTRAMUSCULAR; INTRAVENOUS at 11:04

## 2023-04-28 RX ADMIN — MIDAZOLAM HYDROCHLORIDE 2 MG: 1 INJECTION INTRAMUSCULAR; INTRAVENOUS at 07:04

## 2023-04-28 RX ADMIN — KETAMINE HYDROCHLORIDE 10 MG: 100 INJECTION INTRAMUSCULAR; INTRAVENOUS at 10:04

## 2023-04-28 RX ADMIN — PROTAMINE SULFATE 75 MG: 10 INJECTION, SOLUTION INTRAVENOUS at 11:04

## 2023-04-28 RX ADMIN — SUCCINYLCHOLINE CHLORIDE 160 MG: 20 INJECTION, SOLUTION INTRAMUSCULAR; INTRAVENOUS at 07:04

## 2023-04-28 NOTE — SUBJECTIVE & OBJECTIVE
Past Medical History:   Diagnosis Date    Anticoagulant long-term use     Atrial fibrillation     Hyperlipidemia     Hypertension     Kidney stones        Past Surgical History:   Procedure Laterality Date    ABLATION OF ARRHYTHMOGENIC FOCUS FOR ATRIAL FIBRILLATION N/A 1/12/2022    Procedure: Ablation atrial fibrillation;  Surgeon: Scotty Shell MD;  Location: [facility] EP LAB;  Service: Cardiology;  Laterality: N/A;  AF/AFL, RADHIKA (Cx if SR), PVI, CTI, RFA, Carto, Gen, AL, 3 Prep    CARDIOVERSION      SHOULDER SURGERY      TREATMENT OF CARDIAC ARRHYTHMIA N/A 8/6/2020    Procedure: CARDIOVERSION;  Surgeon: Scotty Shell MD;  Location: [facility] EP LAB;  Service: Cardiology;  Laterality: N/A;  AF, RADHIKA, DCCV, MAC, AL, 3 Prep       Review of patient's allergies indicates:  No Known Allergies    Current Facility-Administered Medications on File Prior to Encounter   Medication    sodium chloride 0.9% bolus 1,000 mL    sodium chloride 0.9% bolus 1,000 mL     Current Outpatient Medications on File Prior to Encounter   Medication Sig    metoprolol succinate (TOPROL-XL) 25 MG 24 hr tablet Take 1 tablet (25 mg total) by mouth once daily.    XARELTO 20 mg Tab TAKE 1 TABLET(20 MG) BY MOUTH EVERY EVENING WITH DINNER     Family History       Problem Relation (Age of Onset)    Arthritis Sister, Sister    Heart disease Father    Kidney failure Father    Liver disease Sister          Tobacco Use    Smoking status: Never    Smokeless tobacco: Never   Substance and Sexual Activity    Alcohol use: Yes     Comment: socially    Drug use: Never    Sexual activity: Not on file     Review of Systems   Constitutional: Negative for chills and fever.   Cardiovascular:  Negative for chest pain, orthopnea and palpitations.   Respiratory:  Negative for cough and shortness of breath.    Gastrointestinal:  Negative for abdominal pain.   Neurological:  Negative for dizziness, headaches and light-headedness.   Psychiatric/Behavioral:  Negative for altered  mental status.    Objective:     Vital Signs (Most Recent):    Vital Signs (24h Range):  BP: ()/()   Arterial Line BP: ()/()         There is no height or weight on file to calculate BMI.            No intake or output data in the 24 hours ending 04/27/23 2000    Lines/Drains/Airways       None                   Physical Exam  Vitals reviewed.   Constitutional:       General: He is not in acute distress.     Appearance: Normal appearance. He is not toxic-appearing.   HENT:      Head: Normocephalic and atraumatic.      Mouth/Throat:      Mouth: Mucous membranes are moist.   Cardiovascular:      Rate and Rhythm: Normal rate. Rhythm irregular.      Heart sounds: No murmur heard.    No friction rub. No gallop.   Pulmonary:      Effort: Pulmonary effort is normal. No respiratory distress.      Breath sounds: Normal breath sounds.   Abdominal:      Palpations: Abdomen is soft.   Musculoskeletal:      Right lower leg: No edema.      Left lower leg: No edema.   Skin:     General: Skin is warm.   Neurological:      Mental Status: He is alert and oriented to person, place, and time. Mental status is at baseline.       Significant Labs: BMP: No results for input(s): GLU, NA, K, CL, CO2, BUN, CREATININE, CALCIUM, MG in the last 48 hours., CMP No results for input(s): NA, K, CL, CO2, GLU, BUN, CREATININE, CALCIUM, PROT, ALBUMIN, BILITOT, ALKPHOS, AST, ALT, ANIONGAP, ESTGFRAFRICA, EGFRNONAA in the last 48 hours., CBC No results for input(s): WBC, HGB, HCT, PLT in the last 48 hours., and All pertinent lab results from the last 24 hours have been reviewed.    Significant Imaging: Echocardiogram: Transthoracic echo (TTE) complete (Cupid Only):   Results for orders placed or performed during the hospital encounter of 05/12/21   Echo Color Flow Doppler? Yes   Result Value Ref Range    BSA 2.32 m2    TDI SEPTAL 0.09 m/s    LV LATERAL E/E' RATIO 3.75 m/s    LV SEPTAL E/E' RATIO 6.67 m/s    LA WIDTH 5.49 cm    TDI LATERAL 0.16 m/s     "LVIDd 5.39 3.5 - 6.0 cm    IVS 1.03 0.6 - 1.1 cm    Posterior Wall 1.03 0.6 - 1.1 cm    LVIDs 3.78 2.1 - 4.0 cm    FS 30 28 - 44 %    LA volume 153.69 cm3    Sinus 3.84 cm    STJ 3.19 cm    Ascending aorta 3.61 cm    LV mass 214.34 g    LA size 4.96 cm    RVDD 4.91 cm    TAPSE 2.56 cm    RV S' 13.23 cm/s    Left Ventricle Relative Wall Thickness 0.38 cm    AV mean gradient 4 mmHg    AV valve area 3.18 cm2    AV Velocity Ratio 0.56     AV index (prosthetic) 0.60     MV valve area p 1/2 method 3.80 cm2    E/A ratio 1.67     Mean e' 0.13 m/s    E wave deceleration time 199.68 msec    IVRT 79.92 msec    MV "A" wave duration 20.55 msec    Pulm vein S/D ratio 1.00     LVOT diameter 2.60 cm    LVOT area 5.3 cm2    LVOT peak umer 0.83 m/s    LVOT peak VTI 19.66 cm    Ao peak umer 1.48 m/s    Ao VTI 32.79 cm    LVOT stroke volume 104.33 cm3    AV peak gradient 9 mmHg    E/E' ratio 4.80 m/s    MV Peak E Umer 0.60 m/s    TR Max Umer 2.51 m/s    MV stenosis pressure 1/2 time 57.91 ms    MV Peak A Umer 0.36 m/s    PV Peak S Umer 0.60 m/s    PV Peak D Umer 0.60 m/s    LV Systolic Volume 61.23 mL    LV Systolic Volume Index 27.0 mL/m2    LV Diastolic Volume 140.78 mL    LV Diastolic Volume Index 62.02 mL/m2    LA Volume Index 67.7 mL/m2    LV Mass Index 94 g/m2    RA Major Axis 5.79 cm    Left Atrium Minor Axis 6.65 cm    Left Atrium Major Axis 6.63 cm    Triscuspid Valve Regurgitation Peak Gradient 25 mmHg    LA Volume Index (Mod) 70.9 mL/m2    LA volume (mod) 160.90 cm3    RA Width 4.68 cm    Right Atrial Pressure (from IVC) 3 mmHg    EF 63 %    TV rest pulmonary artery pressure 28 mmHg    Narrative    · The left ventricle is normal in size with normal systolic function.  · The estimated ejection fraction is 63%.  · Normal left ventricular diastolic function.  · Severe left atrial enlargement.  · Normal right ventricular size with normal right ventricular systolic   function.  · Moderate right atrial enlargement.  · Mild mitral " regurgitation.  · Mild tricuspid regurgitation.  · Normal central venous pressure (3 mmHg).  · The estimated PA systolic pressure is 28 mmHg.  · Trivial pericardial effusion. Outside the RA.

## 2023-04-28 NOTE — HPI
Patient is a 58 year old male with history of pAF (CHADS2-VASc 1) on Xarelto, HTN that presents for re-do PVI.   TTE with LVE 63%, mild MR/TR, severe LAE  RADHIKA 08/06/2020: LVEF 50%, mild RV enlargement, mild MR    Dysphagia or odynophagia:  No  Liver Disease, esophageal disease, or known varices:  No  Upper GI Bleeding: No  Prior neck surgery or radiation:  No  History of anesthetic difficulties:  No  Family history of anesthetic difficulties:  No  Able to move neck in all directions:  Yes  Snoring:  No  Sleep Apnea:  No  Last oral intake:  12 hours ago

## 2023-04-28 NOTE — ANESTHESIA PROCEDURE NOTES
Intubation    Date/Time: 4/28/2023 7:21 AM  Performed by: Ledy Chavez CRNA  Authorized by: Satnam Sheldon MD     Intubation:     Induction:  Intravenous    Intubated:  Postinduction    Mask Ventilation:  Easy mask    Attempts:  1    Attempted By:  CRNA    Method of Intubation:  Video laryngoscopy    Blade:  Townsend 3    Laryngeal View Grade: Grade I - full view of cords      Difficult Airway Encountered?: No      Complications:  None    Airway Device:  Oral endotracheal tube    Airway Device Size:  7.5    Style/Cuff Inflation:  Cuffed (inflated to minimal occlusive pressure)    Tube secured:  23    Secured at:  The lips    Placement Verified By:  Capnometry    Complicating Factors:  None    Findings Post-Intubation:  BS equal bilateral and atraumatic/condition of teeth unchanged

## 2023-04-28 NOTE — TRANSFER OF CARE
Anesthesia Transfer of Care Note    Patient: Jhonatan Sofia    Procedure(s) Performed: Procedure(s) (LRB):  Ablation atrial fibrillation (N/A)  Ablation, Atrial Flutter, Atypical (N/A)    Patient location: PACU    Anesthesia Type: general    Transport from OR: Transported from OR on 6-10 L/min O2 by face mask with adequate spontaneous ventilation    Post pain: adequate analgesia    Post assessment: no apparent anesthetic complications and tolerated procedure well    Post vital signs: stable    Level of consciousness: awake, alert and oriented    Nausea/Vomiting: no nausea/vomiting    Complications: none    Transfer of care protocol was followed      Last vitals:   Visit Vitals  /87 (BP Location: Left arm, Patient Position: Lying)   Pulse (!) 55   Temp 36.6 °C (97.9 °F) (Temporal)   Resp 16   Ht 6' (1.829 m)   Wt 99.8 kg (220 lb)   SpO2 96%   BMI 29.84 kg/m²

## 2023-04-28 NOTE — ANESTHESIA PROCEDURE NOTES
Arterial    Diagnosis: a fib    Patient location during procedure: done in OR    Staffing  Authorizing Provider: Satnam Sheldon MD  Performing Provider: Satnam Sheldon MD    Anesthesiologist was present at the time of the procedure.  Arterial  Skin Prep: chlorhexidine gluconate  Local Infiltration: none  Orientation: right  Location: radial    Catheter Size: 20 G  Catheter placement by Anatomical landmarks. Heme positive aspiration all ports. Insertion Attempts: 1  Assessment  Dressing: secured with tape and tegaderm

## 2023-04-28 NOTE — ANESTHESIA PREPROCEDURE EVALUATION
04/28/2023  Jhonatan Sofia is a 58 y.o., male   Patient Active Problem List   Diagnosis    Essential hypertension    Paroxysmal atrial fibrillation    Atrial fibrillation    History of cardioversion    Anticoagulant long-term use    History of radiofrequency ablation (RFA) procedure for cardiac arrhythmia     .      Pre-op Assessment          Review of Systems      Physical Exam    Airway:  No airway management difficulties anticipated  Dental:No active dental issues noted  Chest/Lungs:  Clear to auscultation    Heart:  Rate: Normal  Rhythm: Regular Rhythm  Sounds: Normal        Anesthesia Plan  Type of Anesthesia, risks & benefits discussed:    Anesthesia Type: Gen ETT  Intra-op Monitoring Plan: Art Line  Informed Consent: Informed consent signed with the Patient and all parties understand the risks and agree with anesthesia plan.  All questions answered.   ASA Score: 3  Anesthesia Plan Notes: Chart reviewed. Patient seen and examined. Anesthesia plan discussed and questions answered. E-consent signed. Satnam Sheldon MD    Ready For Surgery From Anesthesia Perspective.     .

## 2023-04-28 NOTE — DISCHARGE INSTRUCTIONS
"Medications:  Make sure to continue taking your blood thinner rivaroxaban (trade name: Xarelto) after your procedure as you would normally take  Take pantoprazole (trade name: Protonix) nightly for at least 30 days after your procedure. This is to protect your esophagus during the post-operative period.  If given a prescription of furosemide (trade name: Lasix), which is a diuretic (fluid pill), you can take it daily or twice daily as needed for fluid retention or shortness of breath following your ablation  You may experience chest discomfort (also known as "pericarditis") with deep breathes, coughing, and/or laying down which is typically normal following your procedure. If this occurs, you can take ibuprofen (Motrin) 800 mg every 8 hours for 2-3 days. If the chest pain is persistent or severe please visit the nearest emergency department.    Groin site management, precautions, and restrictions:  Remove the bandages over your groin area the morning after your procedure. You can show after you remove these bandages. Keep the groin sites clean and dry. You do not need to apply ointments or bandages to the area.   If oozing from groin site occurs, apply pressure without letting up for 15 minutes and lay flat for 1 hour. If bleeding has resolved, you can continue to monitor. If the bleeding continues or there is significant swelling or pain in the groin area, please visit the nearest ER for evaluation and treatment. DO NOT STOP TAKING YOUR BLOOD THINNER UNLESS INSTRUCTED BY A PHYSICIAN.   Do not take baths or submerge your groin area or at least 1 week or when the puncture sites in your groin have completely healed  Do not lift anything over 10 lbs for the first week after your procedure, and avoid strenuous activity during this time to allow for the groin sites to heal. After 1 week, there are no activity restrictions.  Please contact the electrophysiology clinic or go to the ER if you experience: severe chest pain, " shortness of breath, bleeding or swelling of the groin sites, or any other concerns.

## 2023-04-28 NOTE — Clinical Note
The groin was prepped. The site was clipped. The patient was draped. The patient was positioned supine. The patient was secured using safety straps, with ulnar pads and to an armboard.

## 2023-04-28 NOTE — NURSING
@[id number] Pt ready for discharge. VSS. Pt reports feeling nauseous. Pt supervised during walk around unit for second time. Reports feeling better after 10-15 mins of sitting quietly. Offered cold towel, pt refused. Skin remains warm and dry. Respirations even unlabored on RA.  Pt given crackers and gingerale for ride home. No vomiting or nausea prior to discharge. Good ROM x 4 extremities. No acute distress noted.  Patient discharged per MD orders. Instructions given on medications, wound care, activity, signs of infection, when to call MD, and follow up appointments. Pt verbalized understanding. PIVs removed. Patient and family used wc off unit to private vehicle.

## 2023-04-28 NOTE — H&P
[facility] - Short Stay Cardiac Unit  Cardiac Electrophysiology  History and Physical     Admission Date: 4/28/2023  Code Status: No Order   Attending Provider: Scotty Shell MD   Principal Problem:Atrial fibrillation    Subjective:     Chief Complaint:  AF/AFL     HPI:  In summary, Mr. Sofia is a 58 y.o. male with HTN, AF/AFL (PVI and CTI-line 1/12/2022) here for redo PVI and atypical AFL RFA. No complaints this morning. On Toprol 25 mg daily and OAC. Last dose of OAC was last night. Last dose of BB was 5 days prior. AFL with CL ~200 ms. EF normal most recently.    ECG today shows NSR       Past Medical History:   Diagnosis Date    Anticoagulant long-term use     Atrial fibrillation     Hyperlipidemia     Hypertension     Kidney stones        Past Surgical History:   Procedure Laterality Date    ABLATION OF ARRHYTHMOGENIC FOCUS FOR ATRIAL FIBRILLATION N/A 1/12/2022    Procedure: Ablation atrial fibrillation;  Surgeon: Scotty Shell MD;  Location: [facility] EP LAB;  Service: Cardiology;  Laterality: N/A;  AF/AFL, RADHIKA (Cx if SR), PVI, CTI, RFA, Carto, Gen, SD, 3 Prep    CARDIOVERSION      SHOULDER SURGERY      TREATMENT OF CARDIAC ARRHYTHMIA N/A 8/6/2020    Procedure: CARDIOVERSION;  Surgeon: Scotty Shell MD;  Location: [facility] EP LAB;  Service: Cardiology;  Laterality: N/A;  AF, RADHIKA, DCCV, MAC, SD, 3 Prep       Review of patient's allergies indicates:  No Known Allergies    Current Facility-Administered Medications on File Prior to Encounter   Medication    sodium chloride 0.9% bolus 1,000 mL    sodium chloride 0.9% bolus 1,000 mL     Current Outpatient Medications on File Prior to Encounter   Medication Sig    metoprolol succinate (TOPROL-XL) 25 MG 24 hr tablet Take 1 tablet (25 mg total) by mouth once daily.    XARELTO 20 mg Tab TAKE 1 TABLET(20 MG) BY MOUTH EVERY EVENING WITH DINNER     Family History       Problem Relation (Age of Onset)    Arthritis Sister, Sister    Heart disease Father    Kidney  failure Father    Liver disease Sister          Tobacco Use    Smoking status: Never    Smokeless tobacco: Never   Substance and Sexual Activity    Alcohol use: Yes     Comment: socially    Drug use: Never    Sexual activity: Not on file     Review of Systems   All other systems reviewed and are negative.  Objective:     Vital Signs (Most Recent):    Vital Signs (24h Range):  BP: ()/()   Arterial Line BP: ()/()           There is no height or weight on file to calculate BMI.            Physical Exam  General: NAD. AAO  HENT: EOMI  Neck: supple. No JVD  CV: RRR. Normal S1/S2. No gallops, rubs, or murmurs. 2+ radial pulses  Resp: CTAB. No increased work of breathing  Ext: warm. No edema.      Significant Labs: All pertinent lab results from the last 24 hours have been reviewed.    Significant Imaging:  Reviewed    Assessment and Plan:     * Atrial fibrillation  - Re-do PVI and atypical flutter ablation  - RADHIKA, cancel if NSR  - PPI x 30 days    Anticoagulation: rivaroxaban  EF (most recent): normal  AAD/AVN agents: Toprol 25 mg QD    The risks, benefits and alternatives of the procedure were explained to the patient, patient's family and/or surrogate decision maker. Risks include (but not limited to) bleeding, hematoma, infection, pain, vascular damage, damage to structures surrounding the vasculature, myocardial damage [perforation, valvular damage], cardiac tamponade, phrenic nerve damage, pulmonary vein stenosis, atrioesophageal (AE) fistula, CVA, MI, and death. Patient is understanding that repeat ablations may be required. All questions were answered. Patient is understanding of these risks, and would like to proceed. Consents signed.          Jax Parra MD  Cardiac Electrophysiology  [facility] - Short Stay Cardiac Unit

## 2023-04-28 NOTE — NURSING
Assumed care of pt from Suzanne ETIENNE RN. + pulses. Pt aao. Neuro intact. Clear speech. Denies pain. VSS. Wife at bedside. Pt tearful. Offered nutrition, hydration at bedside. Call light within reach. Bilateral groins soft and free of bleeding. Bilateral stopcocks in place. Tele monitor in progress.

## 2023-04-28 NOTE — NURSING TRANSFER
Nursing Transfer Note      4/28/2023     Reason patient is being transferred: to short stay post recovery    Transfer To: [facility]U 10    Transfer via stretcher    Transfer with cardiac monitoring    Transported by RN    Medicines sent: none    Any special needs or follow-up needed: 2nd 30 min check due @ 1430. Continue bedrest    Chart send with patient: Yes    Notified: spouse    Patient reassessed at: to be done by receiving RN (date, time)    Upon arrival to floor: cardiac monitor applied, patient oriented to room, call bell in reach, and bed in lowest position

## 2023-04-28 NOTE — CONSULTS
[facility]  Cardiology  Consult Note    Patient Name: Jhonatan Sofia  MRN: [id number]  Admission Date: (Not on file)  Hospital Length of Stay: 0 days  Code Status: No Order   Attending Provider: Scotty Shell MD   Consulting Provider: Ismael Davila MD  Primary Care Physician: Jose Sanchez MD  Principal Problem:Atrial fibrillation    Patient information was obtained from patient, past medical records and ER records.       Subjective:     Reason for consult:  RADHIKA to evaluate SURINDER     HPI:   Patient is a 58 year old male with history of pAF (CHADS2-VASc 1) on Xarelto, HTN that presents for re-do PVI.   TTE with LVE 63%, mild MR/TR, severe LAE  RADHIKA 08/06/2020: LVEF 50%, mild RV enlargement, mild MR    Dysphagia or odynophagia:  No  Liver Disease, esophageal disease, or known varices:  No  Upper GI Bleeding: No  Prior neck surgery or radiation:  No  History of anesthetic difficulties:  No  Family history of anesthetic difficulties:  No  Able to move neck in all directions:  Yes  Snoring:  No  Sleep Apnea:  No  Last oral intake:  12 hours ago        Past Medical History:   Diagnosis Date    Anticoagulant long-term use     Atrial fibrillation     Hyperlipidemia     Hypertension     Kidney stones        Past Surgical History:   Procedure Laterality Date    ABLATION OF ARRHYTHMOGENIC FOCUS FOR ATRIAL FIBRILLATION N/A 1/12/2022    Procedure: Ablation atrial fibrillation;  Surgeon: Scotty Shell MD;  Location: [facility] EP [facility];  Service: Cardiology;  Laterality: N/A;  AF/AFL, RADHIKA (Cx if SR), PVI, CTI, RFA, Carto, Gen, [place], 3 Prep    CARDIOVERSION      SHOULDER SURGERY      TREATMENT OF CARDIAC ARRHYTHMIA N/A 8/6/2020    Procedure: CARDIOVERSION;  Surgeon: Scotty Shell MD;  Location: [facility] EP LAB;  Service: Cardiology;  Laterality: N/A;  AF, RADHIKA, DCCV, MAC, [place], 3 Prep       Review of patient's allergies indicates:  No Known Allergies    Current Facility-Administered Medications on File Prior to Encounter    Medication    sodium chloride 0.9% bolus 1,000 mL    sodium chloride 0.9% bolus 1,000 mL     Current Outpatient Medications on File Prior to Encounter   Medication Sig    metoprolol succinate (TOPROL-XL) 25 MG 24 hr tablet Take 1 tablet (25 mg total) by mouth once daily.    XARELTO 20 mg Tab TAKE 1 TABLET(20 MG) BY MOUTH EVERY EVENING WITH DINNER     Family History       Problem Relation (Age of Onset)    Arthritis Sister, Sister    Heart disease Father    Kidney failure Father    Liver disease Sister          Tobacco Use    Smoking status: Never    Smokeless tobacco: Never   Substance and Sexual Activity    Alcohol use: Yes     Comment: socially    Drug use: Never    Sexual activity: Not on file     Review of Systems   Constitutional: Negative for chills and fever.   Cardiovascular:  Negative for chest pain, orthopnea and palpitations.   Respiratory:  Negative for cough and shortness of breath.    Gastrointestinal:  Negative for abdominal pain.   Neurological:  Negative for dizziness, headaches and light-headedness.   Psychiatric/Behavioral:  Negative for altered mental status.    Objective:     Vital Signs (Most Recent):    Vital Signs (24h Range):  BP: ()/()   Arterial Line BP: ()/()         There is no height or weight on file to calculate BMI.            No intake or output data in the 24 hours ending 04/27/23 2000    Lines/Drains/Airways       None                   Physical Exam  Vitals reviewed.   Constitutional:       General: He is not in acute distress.     Appearance: Normal appearance. He is not toxic-appearing.   HENT:      Head: Normocephalic and atraumatic.      Mouth/Throat:      Mouth: Mucous membranes are moist.   Cardiovascular:      Rate and Rhythm: Normal rate. Rhythm irregular.      Heart sounds: No murmur heard.    No friction rub. No gallop.   Pulmonary:      Effort: Pulmonary effort is normal. No respiratory distress.      Breath sounds: Normal breath sounds.   Abdominal:      Palpations:  "Abdomen is soft.   Musculoskeletal:      Right lower leg: No edema.      Left lower leg: No edema.   Skin:     General: Skin is warm.   Neurological:      Mental Status: He is alert and oriented to person, place, and time. Mental status is at baseline.       Significant Labs: BMP: No results for input(s): GLU, NA, K, CL, CO2, BUN, CREATININE, CALCIUM, MG in the last 48 hours., CMP No results for input(s): NA, K, CL, CO2, GLU, BUN, CREATININE, CALCIUM, PROT, ALBUMIN, BILITOT, ALKPHOS, AST, ALT, ANIONGAP, ESTGFRAFRICA, EGFRNONAA in the last 48 hours., CBC No results for input(s): WBC, HGB, HCT, PLT in the last 48 hours., and All pertinent lab results from the last 24 hours have been reviewed.    Significant Imaging: Echocardiogram: Transthoracic echo (TTE) complete (Cupid Only):   Results for orders placed or performed during the hospital encounter of 05/12/21   Echo Color Flow Doppler? Yes   Result Value Ref Range    BSA 2.32 m2    TDI SEPTAL 0.09 m/s    LV LATERAL E/E' RATIO 3.75 m/s    LV SEPTAL E/E' RATIO 6.67 m/s    LA WIDTH 5.49 cm    TDI LATERAL 0.16 m/s    LVIDd 5.39 3.5 - 6.0 cm    IVS 1.03 0.6 - 1.1 cm    Posterior Wall 1.03 0.6 - 1.1 cm    LVIDs 3.78 2.1 - 4.0 cm    FS 30 28 - 44 %    LA volume 153.69 cm3    Sinus 3.84 cm    STJ 3.19 cm    Ascending aorta 3.61 cm    LV mass 214.34 g    LA size 4.96 cm    RVDD 4.91 cm    TAPSE 2.56 cm    RV S' 13.23 cm/s    Left Ventricle Relative Wall Thickness 0.38 cm    AV mean gradient 4 mmHg    AV valve area 3.18 cm2    AV Velocity Ratio 0.56     AV index (prosthetic) 0.60     MV valve area p 1/2 method 3.80 cm2    E/A ratio 1.67     Mean e' 0.13 m/s    E wave deceleration time 199.68 msec    IVRT 79.92 msec    MV "A" wave duration 20.55 msec    Pulm vein S/D ratio 1.00     LVOT diameter 2.60 cm    LVOT area 5.3 cm2    LVOT peak román 0.83 m/s    LVOT peak VTI 19.66 cm    Ao peak román 1.48 m/s    Ao VTI 32.79 cm    LVOT stroke volume 104.33 cm3    AV peak gradient 9 mmHg "    E/E' ratio 4.80 m/s    MV Peak E Umer 0.60 m/s    TR Max Umer 2.51 m/s    MV stenosis pressure 1/2 time 57.91 ms    MV Peak A Umer 0.36 m/s    PV Peak S Umer 0.60 m/s    PV Peak D Umer 0.60 m/s    LV Systolic Volume 61.23 mL    LV Systolic Volume Index 27.0 mL/m2    LV Diastolic Volume 140.78 mL    LV Diastolic Volume Index 62.02 mL/m2    LA Volume Index 67.7 mL/m2    LV Mass Index 94 g/m2    RA Major Axis 5.79 cm    Left Atrium Minor Axis 6.65 cm    Left Atrium Major Axis 6.63 cm    Triscuspid Valve Regurgitation Peak Gradient 25 mmHg    LA Volume Index (Mod) 70.9 mL/m2    LA volume (mod) 160.90 cm3    RA Width 4.68 cm    Right Atrial Pressure (from IVC) 3 mmHg    EF 63 %    TV rest pulmonary artery pressure 28 mmHg    Narrative    · The left ventricle is normal in size with normal systolic function.  · The estimated ejection fraction is 63%.  · Normal left ventricular diastolic function.  · Severe left atrial enlargement.  · Normal right ventricular size with normal right ventricular systolic   function.  · Moderate right atrial enlargement.  · Mild mitral regurgitation.  · Mild tricuspid regurgitation.  · Normal central venous pressure (3 mmHg).  · The estimated PA systolic pressure is 28 mmHg.  · Trivial pericardial effusion. Outside the RA.        Assessment and Plan:     * Atrial fibrillation  Patient is a 58 year old male with history of pAF (CHADS2-VASc 1) on Xarelto, HTN that presents for re-do PVI.   TTE with LVE 63%, mild MR/TR, severe LAE  RADHIKA 08/06/2020: LVEF 50%, mild RV enlargement, mild MR    ECG sinus, RADHIKA cancelled, discussed with EP staff      VTE Risk Mitigation (From admission, onward)      None          Staff attestation to follow. Further recommendations per attending addendum    Thank you for your consult. We will sign off post RADHIKA. Please contact us if you have any additional questions.    Ismael Davila MD  Cardiology PGY5  [facility]

## 2023-04-28 NOTE — SUBJECTIVE & OBJECTIVE
Past Medical History:   Diagnosis Date    Anticoagulant long-term use     Atrial fibrillation     Hyperlipidemia     Hypertension     Kidney stones        Past Surgical History:   Procedure Laterality Date    ABLATION OF ARRHYTHMOGENIC FOCUS FOR ATRIAL FIBRILLATION N/A 1/12/2022    Procedure: Ablation atrial fibrillation;  Surgeon: Scotty Shell MD;  Location: [facility] EP [facility];  Service: Cardiology;  Laterality: N/A;  AF/AFL, RADHIKA (Cx if SR), PVI, CTI, RFA, Carto, Gen, [place], 3 Prep    CARDIOVERSION      SHOULDER SURGERY      TREATMENT OF CARDIAC ARRHYTHMIA N/A 8/6/2020    Procedure: CARDIOVERSION;  Surgeon: Scotty Shell MD;  Location: [facility] EP [facility];  Service: Cardiology;  Laterality: N/A;  AF, RADHIKA, DCCV, MAC, [place], 3 Prep       Review of patient's allergies indicates:  No Known Allergies    Current Facility-Administered Medications on File Prior to Encounter   Medication    sodium chloride 0.9% bolus 1,000 mL    sodium chloride 0.9% bolus 1,000 mL     Current Outpatient Medications on File Prior to Encounter   Medication Sig    metoprolol succinate (TOPROL-XL) 25 MG 24 hr tablet Take 1 tablet (25 mg total) by mouth once daily.    XARELTO 20 mg Tab TAKE 1 TABLET(20 MG) BY MOUTH EVERY EVENING WITH DINNER     Family History       Problem Relation (Age of Onset)    Arthritis Sister, Sister    Heart disease Father    Kidney failure Father    Liver disease Sister          Tobacco Use    Smoking status: Never    Smokeless tobacco: Never   Substance and Sexual Activity    Alcohol use: Yes     Comment: socially    Drug use: Never    Sexual activity: Not on file     Review of Systems   All other systems reviewed and are negative.  Objective:     Vital Signs (Most Recent):    Vital Signs (24h Range):  BP: ()/()   Arterial Line BP: ()/()           There is no height or weight on file to calculate BMI.            Physical Exam  General: NAD. AAO  HENT: EOMI  Neck: supple. No JVD  CV: RRR. Normal S1/S2. No gallops, rubs, or murmurs. 2+  radial pulses  Resp: CTAB. No increased work of breathing  Ext: warm. No edema.      Significant Labs: All pertinent lab results from the last 24 hours have been reviewed.    Significant Imaging:  Reviewed

## 2023-04-28 NOTE — HPI
In summary, Mr. Sofia is a 58 y.o. male with HTN, AF/AFL (PVI and CTI-line 1/12/2022) here for redo PVI and atypical AFL RFA. No complaints this morning. On Toprol 25 mg daily and OAC. Last dose of OAC was last night. Last dose of BB was 5 days prior. AFL with CL ~200 ms. EF normal most recently.    ECG today shows NSR

## 2023-04-28 NOTE — ASSESSMENT & PLAN NOTE
- Re-do PVI and atypical flutter ablation  - RADHIKA, cancel if NSR  - PPI x 30 days    Anticoagulation: rivaroxaban  EF (most recent): normal  AAD/AVN agents: Toprol 25 mg QD    The risks, benefits and alternatives of the procedure were explained to the patient, patient's family and/or surrogate decision maker. Risks include (but not limited to) bleeding, hematoma, infection, pain, vascular damage, damage to structures surrounding the vasculature, myocardial damage [perforation, valvular damage], cardiac tamponade, phrenic nerve damage, pulmonary vein stenosis, atrioesophageal (AE) fistula, CVA, MI, and death. Patient is understanding that repeat ablations may be required. All questions were answered. Patient is understanding of these risks, and would like to proceed. Consents signed.

## 2023-04-28 NOTE — SUBJECTIVE & OBJECTIVE
Past Medical History:   Diagnosis Date    Anticoagulant long-term use     Atrial fibrillation     Hyperlipidemia     Hypertension     Kidney stones        Past Surgical History:   Procedure Laterality Date    ABLATION OF ARRHYTHMOGENIC FOCUS FOR ATRIAL FIBRILLATION N/A 1/12/2022    Procedure: Ablation atrial fibrillation;  Surgeon: Scotty Shell MD;  Location: [facility] EP LAB;  Service: Cardiology;  Laterality: N/A;  AF/AFL, RADHIKA (Cx if SR), PVI, CTI, RFA, Carto, Gen, [place], 3 Prep    CARDIOVERSION      SHOULDER SURGERY      TREATMENT OF CARDIAC ARRHYTHMIA N/A 8/6/2020    Procedure: CARDIOVERSION;  Surgeon: Scotty Shell MD;  Location: [facility] EP LAB;  Service: Cardiology;  Laterality: N/A;  AF, RADHIKA, DCCV, MAC, [place], 3 Prep       Review of patient's allergies indicates:  No Known Allergies    Current Facility-Administered Medications on File Prior to Encounter   Medication    sodium chloride 0.9% bolus 1,000 mL    sodium chloride 0.9% bolus 1,000 mL     Current Outpatient Medications on File Prior to Encounter   Medication Sig    XARELTO 20 mg Tab TAKE 1 TABLET(20 MG) BY MOUTH EVERY EVENING WITH DINNER    metoprolol succinate (TOPROL-XL) 25 MG 24 hr tablet Take 1 tablet (25 mg total) by mouth once daily.     Family History       Problem Relation (Age of Onset)    Arthritis Sister, Sister    Heart disease Father    Kidney failure Father    Liver disease Sister          Tobacco Use    Smoking status: Never    Smokeless tobacco: Never   Substance and Sexual Activity    Alcohol use: Yes     Comment: socially    Drug use: Never    Sexual activity: Not on file     Review of Systems   Constitutional: Negative for chills and fever.   HENT:  Negative for congestion and hearing loss.    Eyes:  Negative for blurred vision and double vision.   Cardiovascular:         See HPI   Respiratory:  Negative for cough, hemoptysis and shortness of breath.    Endocrine: Negative for cold intolerance and heat intolerance.   Musculoskeletal:  Negative  for joint pain and joint swelling.   Gastrointestinal:  Negative for abdominal pain, nausea and vomiting.   Genitourinary:  Negative for dysuria and hematuria.   Neurological:  Negative for focal weakness and headaches.   Psychiatric/Behavioral:  Negative for altered mental status.    All other systems reviewed and are negative.  Objective:     Vital Signs (Most Recent):  Temp: 97.9 °F (36.6 °C) (04/28/23 1430)  Pulse: 65 (04/28/23 1610)  Resp: 18 (04/28/23 1430)  BP: 113/76 (04/28/23 1530)  SpO2: 97 % (04/28/23 1430) Vital Signs (24h Range):  Temp:  [97.3 °F (36.3 °C)-97.9 °F (36.6 °C)] 97.9 °F (36.6 °C)  Pulse:  [55-84] 65  Resp:  [15-23] 18  SpO2:  [92 %-100 %] 97 %  BP: (108-136)/(61-92) 113/76       Weight: 99.8 kg (220 lb)  Body mass index is 29.84 kg/m².    SpO2: 97 %       Physical Exam    Significant Labs: All pertinent lab results from the last 24 hours have been reviewed.    Significant Imaging:  h

## 2023-04-28 NOTE — ANESTHESIA POSTPROCEDURE EVALUATION
Anesthesia Post Evaluation    Patient: Jhonatan Sofia    Procedure(s) Performed: Procedure(s) (LRB):  Ablation atrial fibrillation (N/A)  Ablation, Atrial Flutter, Atypical (N/A)    Final Anesthesia Type: general      Level of consciousness: awake and alert  Post-procedure vital signs: reviewed and stable  Pain control: Pain has been treated.  Airway patency: patent    PONV status: Absent or treated.  Anesthetic complications: no      Cardiovascular status: hemodynamically stable  Respiratory status: unassisted  Hydration status: euvolemic            Vitals Value Taken Time   /70 04/28/23 1417   Temp 36.3 °C (97.3 °F) 04/28/23 1223   Pulse 68 04/28/23 1419   Resp 18 04/28/23 1417   SpO2 93 % 04/28/23 1419   Vitals shown include unvalidated device data.      No case tracking events are documented in the log.      Pain/Alex Score: Alex Score: 9 (4/28/2023  1:30 PM)

## 2023-04-28 NOTE — NURSING
Report received from SREEKANTH Reynolds. Patient s/p PVI. Bilat groin sites with sutures intact. No bleeding or hematoma noted. + pulses. Post procedure protocol discussed with patient. VSS. Connected to cont tele monitoring. Call light in reach.

## 2023-04-28 NOTE — ASSESSMENT & PLAN NOTE
Patient is a 58 year old male with history of pAF (CHADS2-VASc 1) on Xarelto, HTN that presents for re-do PVI.   TTE with LVE 63%, mild MR/TR, severe LAE  RADHIKA 08/06/2020: LVEF 50%, mild RV enlargement, mild MR    1. RADHIKA for evaluation of SURINDER   -No absolute contraindications of esophageal stricture, tumor, perforation, laceration,or diverticulum and/or active GI bleed  -The risks, benefits & alternatives of the procedure were explained to the patient.   -The risks of transesophageal echo include but are not limited to:  Dental trauma, esophageal trauma/perforation, bleeding, laryngospasm/brochospasm, aspiration, sore throat/hoarseness, & dislodgement of the endotracheal tube/nasogastric tube (where applicable).    -The risks of moderate sedation include hypotension, respiratory depression, arrhythmias, bronchospasm, & death.    -Informed consent was obtained. The patient is agreeable to proceed with the procedure and all questions and concerns addressed.

## 2023-04-28 NOTE — NURSING
Condom cath removed and noted large urine output in bag. Pt ambulates around unit without difficulty. Voided in restroom. Strong, steady and independent gait noted. VSS. No acute distress noted.

## 2023-04-29 NOTE — HOSPITAL COURSE
Patient underwent successful  re-do PV & CTI with new posterior wall isolation and mitral annular flutter RFA (lateral line)  for treatment of  AF and atypical AFL . No evidence of intra- or post-procedure complications. Post-ablation ECG shows NSR, and no acute abnormalities.     EP medications at discharge:  Antiarrhythmics and/or AVN agents: unchanged.   Initiation of Protonix 40 mg QD & carafate 1 g QID x 30 days & Lasix 20 mg PRN swelling/SOB.   Patient was instructed to continue their oral anticoagulation as previously prescribed  Patient was instructed to take ibuprofen 800 mg TID x 3 days for pericarditis.     Groin access sites without hematoma or bleeding. Activity restrictions given to patient. Instructed to seek medical attention for shortness of breath, chest discomfort not alleviated with NSAIDs, bleeding/hematoma formation at the access sites, acute onset of neurologic symptoms, N/V, or hematemesis. At discharge the patient denied CP, SOB, access site bleeding/hematoma, or any other complaints or evidence of complications.    ----------------------------------------------------------------------------------------

## 2023-04-29 NOTE — DISCHARGE SUMMARY
[facility] - Short Stay Cardiac Unit  Cardiac Electrophysiology  Discharge Summary      Patient Name: Jhonatan Sofia  MRN: [id number]  Admission Date: 4/28/2023  Hospital Length of Stay: 0 days  Discharge Date and Time:  04/28/2023 8:39 PM  Attending Physician: No att. providers found    Discharging Provider: Jax Parra MD  Primary Care Physician: Jose Sanchez MD     Hospital Course:  Patient underwent successful  re-do PV & CTI with new posterior wall isolation and mitral annular flutter RFA (lateral line)  for treatment of  AF and atypical AFL . No evidence of intra- or post-procedure complications. Post-ablation ECG shows NSR, and no acute abnormalities.     EP medications at discharge:   Antiarrhythmics and/or AVN agents: unchanged.    Initiation of Protonix 40 mg QD & carafate 1 g QID x 30 days & Lasix 20 mg PRN swelling/SOB.    Patient was instructed to continue their oral anticoagulation as previously prescribed   Patient was instructed to take ibuprofen 800 mg TID x 3 days for pericarditis.     Groin access sites without hematoma or bleeding. Activity restrictions given to patient. Instructed to seek medical attention for shortness of breath, chest discomfort not alleviated with NSAIDs, bleeding/hematoma formation at the access sites, acute onset of neurologic symptoms, N/V, or hematemesis. At discharge the patient denied CP, SOB, access site bleeding/hematoma, or any other complaints or evidence of complications.    ----------------------------------------------------------------------------------------      HPI:   In summary, Mr. Sofia is a 58 y.o. male with HTN, AF/AFL (PVI and CTI-line 1/12/2022) here for redo PVI and atypical AFL RFA. No complaints this morning. On Toprol 25 mg daily and OAC. Last dose of OAC was last night. Last dose of BB was 5 days prior. AFL with CL ~200 ms. EF normal most recently.    ECG today shows NSR       Procedure(s) (LRB):  Ablation atrial fibrillation  "(N/A)  Ablation, Atrial Flutter, Atypical (N/A)     Indwelling Lines/Drains at time of discharge:  Lines/Drains/Airways     None                     Goals of Care Treatment Preferences:         Consults:     Significant Diagnostic Studies: Labs: All labs within the past 24 hours have been reviewed    Pending Diagnostic Studies:     Procedure Component Value Units Date/Time    EKG 12-lead [[id number]]     Order Status: Sent Lab Status: No result     Transesophageal echo (RADHIKA) [[id number]]     Order Status: Sent Lab Status: No result           Final Active Diagnoses:    Diagnosis Date Noted POA    PRINCIPAL PROBLEM:  Atrial fibrillation [I48.91] 08/06/2020 Yes    Anticoagulant long-term use [Z79.01] 11/29/2021 Not Applicable      Problems Resolved During this Admission:     No new Assessment & Plan notes have been filed under this hospital service since the last note was generated.  Service: Arrhythmia      Discharged Condition: stable    Disposition: Home or Self Care    Follow Up:   Follow-up Information     Scotty Shell MD Follow up in 6 week(s).    Specialties: Electrophysiology, Cardiology  Contact information:  [address] [place] [id number]  [phone]                       Patient Instructions:   Medications:   Make sure to continue taking your blood thinner rivaroxaban (trade name: Xarelto) after your procedure as you would normally take   Take pantoprazole (trade name: Protonix) nightly for at least 30 days after your procedure. This is to protect your esophagus during the post-operative period.   If given a prescription of furosemide (trade name: Lasix), which is a diuretic (fluid pill), you can take it daily or twice daily as needed for fluid retention or shortness of breath following your ablation   You may experience chest discomfort (also known as "pericarditis") with deep breathes, coughing, and/or laying down which is typically normal following your procedure. If this occurs, you can " take ibuprofen (Motrin) 800 mg every 8 hours for 2-3 days. If the chest pain is persistent or severe please visit the nearest emergency department.    Groin site management, precautions, and restrictions:   Remove the bandages over your groin area the morning after your procedure. You can show after you remove these bandages. Keep the groin sites clean and dry. You do not need to apply ointments or bandages to the area.    If oozing from groin site occurs, apply pressure without letting up for 15 minutes and lay flat for 1 hour. If bleeding has resolved, you can continue to monitor. If the bleeding continues or there is significant swelling or pain in the groin area, please visit the nearest ER for evaluation and treatment. DO NOT STOP TAKING YOUR BLOOD THINNER UNLESS INSTRUCTED BY A PHYSICIAN.    Do not take baths or submerge your groin area or at least 1 week or when the puncture sites in your groin have completely healed   Do not lift anything over 10 lbs for the first week after your procedure, and avoid strenuous activity during this time to allow for the groin sites to heal. After 1 week, there are no activity restrictions.   Please contact the electrophysiology clinic or go to the ER if you experience: severe chest pain, shortness of breath, bleeding or swelling of the groin sites, or any other concerns.    Medications:  Reconciled Home Medications:      Medication List      START taking these medications    furosemide 20 MG tablet  Commonly known as: LASIX  Take 1 tablet (20 mg total) by mouth daily as needed (swelling and shortness of breath).     sucralfate 1 gram tablet  Commonly known as: CARAFATE  Take 1 tablet (1 g total) by mouth 4 (four) times daily.        CONTINUE taking these medications    metoprolol succinate 25 MG 24 hr tablet  Commonly known as: TOPROL-XL  Take 1 tablet (25 mg total) by mouth once daily.     pantoprazole 40 MG tablet  Commonly known as: PROTONIX  Take 1 tablet (40 mg  total) by mouth once daily. Start taking this medication on 4/14/2023     XARELTO 20 mg Tab  Generic drug: rivaroxaban  TAKE 1 TABLET(20 MG) BY MOUTH EVERY EVENING WITH DINNER            Time spent on the discharge of patient: 15 minutes    Jax Parra MD  Cardiac Electrophysiology  [facility] - Short Stay Cardiac Unit

## 2023-05-25 ENCOUNTER — TELEPHONE (OUTPATIENT)
Dept: ELECTROPHYSIOLOGY | Facility: CLINIC | Age: 58
End: 2023-05-25
Payer: COMMERCIAL

## 2023-05-25 ENCOUNTER — PATIENT MESSAGE (OUTPATIENT)
Dept: ELECTROPHYSIOLOGY | Facility: CLINIC | Age: 58
End: 2023-05-25
Payer: COMMERCIAL

## 2023-05-25 NOTE — TELEPHONE ENCOUNTER
----- Message from Britt Muñoz sent at 5/24/2023 10:20 AM CDT -----  Regarding: return to work  Pt had a procedure last Month and needs a release to return to work for full duty sent to his email on file.  He can be reached at [phone].    Thank you

## 2023-05-25 NOTE — TELEPHONE ENCOUNTER
----- Message from Meron Terry MA sent at 5/25/2023  1:07 PM CDT -----  Regarding: FW: Patient advice  Contact: Pt    ----- Message -----  From: Kushal Cage  Sent: 5/25/2023  12:53 PM CDT  To: [facility] MAHER Staff  Subject: Patient advice                                   Pt called in regards to getting an letter stated that he can return back to regular duties at work       Pt can be reached at [phone] or [facility]

## 2023-05-26 ENCOUNTER — CLINICAL SUPPORT (OUTPATIENT)
Dept: ENDOSCOPY | Facility: HOSPITAL | Age: 58
End: 2023-05-26
Attending: INTERNAL MEDICINE
Payer: COMMERCIAL

## 2023-05-26 ENCOUNTER — TELEPHONE (OUTPATIENT)
Dept: ENDOSCOPY | Facility: HOSPITAL | Age: 58
End: 2023-05-26

## 2023-05-26 DIAGNOSIS — Z12.11 COLON CANCER SCREENING: ICD-10-CM

## 2023-05-26 NOTE — TELEPHONE ENCOUNTER
Hi Dr SHARMILA Shell,  Pt has order for a colonoscopy. Can he hold Xarelto x 2 days per Endoscopy protocol? Please advise.  Thanks.   Karrie STACK.

## 2023-05-26 NOTE — PLAN OF CARE
Contacted pt to schedule colonoscopy. Request  for Kamran hold sent to Dr Scotty Shell. New PAT appt scheduled. Pt verbalized understanding.

## 2023-05-29 ENCOUNTER — TELEPHONE (OUTPATIENT)
Dept: ENDOSCOPY | Facility: HOSPITAL | Age: 58
End: 2023-05-29
Payer: COMMERCIAL

## 2023-05-29 NOTE — TELEPHONE ENCOUNTER
Message  Received: 3 days ago  SREEKANTH Duong RN  Caller: Unspecified (3 days ago, 10:48 AM)  Patient is s/p cardiac ablation on 4/28/2023.  Xarelto cannot be interrupted x 8 weeks post ablation.   (After 6/28/2023).     SREEKANTH Porras           Previous Messages    Patient Calls  (Newest Message First)   Chiquita Leo RN  You 3 days ago     MR  Patient is s/p cardiac ablation on 4/28/2023.  Xarelto cannot be interrupted x 8 weeks post ablation.   (After 6/28/2023).     SREEKANTH Porras MA routed conversation to Chiquita Leo RN 3 days ago      You routed conversation to Scotty Shell MD; Suleman Lackey 3 days ago     You 3 days ago     [facility] Dr SHARMILA Shell,  Pt has order for a colonoscopy. Can he hold Xarelto x 2 days per Endoscopy protocol? Please advise.  Thanks.   Karrie STACK.

## 2023-06-20 ENCOUNTER — OFFICE VISIT (OUTPATIENT)
Dept: ELECTROPHYSIOLOGY | Facility: CLINIC | Age: 58
End: 2023-06-20
Payer: COMMERCIAL

## 2023-06-20 ENCOUNTER — HOSPITAL ENCOUNTER (OUTPATIENT)
Dept: CARDIOLOGY | Facility: CLINIC | Age: 58
Discharge: HOME | End: 2023-06-20
Payer: COMMERCIAL

## 2023-06-20 VITALS
SYSTOLIC BLOOD PRESSURE: 124 MMHG | DIASTOLIC BLOOD PRESSURE: 64 MMHG | HEIGHT: 72 IN | BODY MASS INDEX: 31.12 KG/M2 | HEART RATE: 49 BPM | WEIGHT: 229.75 LBS

## 2023-06-20 DIAGNOSIS — I48.0 PAROXYSMAL ATRIAL FIBRILLATION: Primary | ICD-10-CM

## 2023-06-20 DIAGNOSIS — I49.8 OTHER SPECIFIED CARDIAC ARRHYTHMIAS: ICD-10-CM

## 2023-06-20 DIAGNOSIS — I48.4 ATYPICAL ATRIAL FLUTTER: ICD-10-CM

## 2023-06-20 PROBLEM — I10 ESSENTIAL HYPERTENSION: Status: RESOLVED | Noted: 2020-06-10 | Resolved: 2023-06-20

## 2023-06-20 PROCEDURE — 3078F DIAST BP <80 MM HG: CPT | Mod: CPTII,S$GLB,, | Performed by: INTERNAL MEDICINE

## 2023-06-20 PROCEDURE — 1159F MED LIST DOCD IN RCRD: CPT | Mod: CPTII,S$GLB,, | Performed by: INTERNAL MEDICINE

## 2023-06-20 PROCEDURE — 3008F PR BODY MASS INDEX (BMI) DOCUMENTED: ICD-10-PCS | Mod: CPTII,S$GLB,, | Performed by: INTERNAL MEDICINE

## 2023-06-20 PROCEDURE — 93005 ELECTROCARDIOGRAM TRACING: CPT | Mod: S$GLB,,, | Performed by: INTERNAL MEDICINE

## 2023-06-20 PROCEDURE — 93010 RHYTHM STRIP: ICD-10-PCS | Mod: S$GLB,,, | Performed by: INTERNAL MEDICINE

## 2023-06-20 PROCEDURE — 3008F BODY MASS INDEX DOCD: CPT | Mod: CPTII,S$GLB,, | Performed by: INTERNAL MEDICINE

## 2023-06-20 PROCEDURE — 1160F RVW MEDS BY RX/DR IN RCRD: CPT | Mod: CPTII,S$GLB,, | Performed by: INTERNAL MEDICINE

## 2023-06-20 PROCEDURE — 1159F PR MEDICATION LIST DOCUMENTED IN MEDICAL RECORD: ICD-10-PCS | Mod: CPTII,S$GLB,, | Performed by: INTERNAL MEDICINE

## 2023-06-20 PROCEDURE — 99999 PR PBB SHADOW E&M-EST. PATIENT-LVL III: ICD-10-PCS | Mod: PBBFAC,,, | Performed by: INTERNAL MEDICINE

## 2023-06-20 PROCEDURE — 93010 ELECTROCARDIOGRAM REPORT: CPT | Mod: S$GLB,,, | Performed by: INTERNAL MEDICINE

## 2023-06-20 PROCEDURE — 1160F PR REVIEW ALL MEDS BY PRESCRIBER/CLIN PHARMACIST DOCUMENTED: ICD-10-PCS | Mod: CPTII,S$GLB,, | Performed by: INTERNAL MEDICINE

## 2023-06-20 PROCEDURE — 3074F PR MOST RECENT SYSTOLIC BLOOD PRESSURE < 130 MM HG: ICD-10-PCS | Mod: CPTII,S$GLB,, | Performed by: INTERNAL MEDICINE

## 2023-06-20 PROCEDURE — 99214 OFFICE O/P EST MOD 30 MIN: CPT | Mod: S$GLB,,, | Performed by: INTERNAL MEDICINE

## 2023-06-20 PROCEDURE — 3074F SYST BP LT 130 MM HG: CPT | Mod: CPTII,S$GLB,, | Performed by: INTERNAL MEDICINE

## 2023-06-20 PROCEDURE — 99214 PR OFFICE/OUTPT VISIT, EST, LEVL IV, 30-39 MIN: ICD-10-PCS | Mod: S$GLB,,, | Performed by: INTERNAL MEDICINE

## 2023-06-20 PROCEDURE — 93005 RHYTHM STRIP: ICD-10-PCS | Mod: S$GLB,,, | Performed by: INTERNAL MEDICINE

## 2023-06-20 PROCEDURE — 3078F PR MOST RECENT DIASTOLIC BLOOD PRESSURE < 80 MM HG: ICD-10-PCS | Mod: CPTII,S$GLB,, | Performed by: INTERNAL MEDICINE

## 2023-06-20 PROCEDURE — 99999 PR PBB SHADOW E&M-EST. PATIENT-LVL III: CPT | Mod: PBBFAC,,, | Performed by: INTERNAL MEDICINE

## 2023-06-20 NOTE — PROGRESS NOTES
Subjective:   Patient ID:  Jhonatan Sofia is a 58 y.o. male who presents for evaluation of Atrial Fibrillation      HPI  Mr. Sofia is a 58 y.o. male with HTN, AF/AFL (PVI and CTI-line 1/12/2022) here for follow up.     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 He works at [employer] and gets yearly on-site physical exams with electrocardiograms. He reports they have been normal until this past March of 2020 when he was diagnosed with atrial fibrillation. He had no obvious symptoms however his wife and daughter report some increased fatigue that they have noted. He was seen by Dr. Sanchez who referred him to general cardiology. Low dose metoprolol and eliquis were prescribed and he was referred for further evaluation. He has yet to start eliquis.     He exercises regularly with heart rate monitoring and notes at times he sustains his heart rate in the 180s-200 bpm range. He does not use exercise supplements, does not drink alcohol, and wife reports no symptoms consistent with sleep apnea. He had an exercise stress echocardiogram in 2017 which noted no ischemia and showed a structurally normal left ventricle with mild left atrial enlargement (LVEF 55%). A recent echocardiogram noted a LVEF of 50% with a severely enlarged left atrium. A recent TSH lab test indicated a euthyroid state.     Available electrocardiograms in Epic which show normal sinus rhythm until an ECG dated 5/14/2020 which showed atrial fibrillation with controlled ventricular response.     His [id number] score is 1 and we discussed that anticoagulation long-term is a risk-benefit shared decision analysis. I also discussed the goal to reduce symptomatic arrhythmic episodes by pharmacologic and/or procedural methods and utilizing a rhythm versus a rate control strategy. He may have latent symptoms per his family (fatigue). Discussed regardless at his  age and activity level as well as this being the first documented event I strongly recommend an attempt at restoration of sinus rhythm to assess symptomatic response. He understood and agreed. Discussed need to take eliquis prior and 4 weeks after cardioversion. He can then decide on whether he wants to continue it long-term. If needed would use flecainide for rhythm control. Discussed risk atrial fibrillation in high-endurance athletes and advised to limit his exercise effort to maintain acceptable age-related exercise heart rate.     8/6/2020: Cardioversion was successfully performed with restoration of normal sinus rhythm.     Spoke with Mr. Sofia regarding his recurrent AF after his DCCV. He reports he felt great for ~5 days after DCCV and then went back into AF. Discussed starting an anti-arrhythmic drug and then repeating a DCCV. He stated he would like to do that. 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 Will start flecainide 100mg bid Sunday and have my nurse schedule an outpatient DCCV Wednesday or Thursday. He should continue eliquis and metoprolol. He has not missed any eliquis and thus will defer RADHIKA.     10/6/2020: He underwent cardioversion 8/6 with symptom improvement. Continued to have AF afterward and was placed on flecainide. In SR when presented for subsequent DCCV which was aborted. He is here for follow up.   He continues to feel better in SR. Has had 2 breakthrough episodes when he delayed his medications. One lasting several hours. Given his young age and symptomatic breakthrough AF despite flecainide, he would likely be a good candidate for PVI. (UPDATE: Dr. Shell confirms good PVI candidate). Risks, benefits, and alternatives discussed with patient. He is interested in proceeding but cannot fit it into his schedule until after the new year. Will have patient RTC in 3 mo to confirm. In interim, continue flecainide. He has chronic bradycardia and is  asymptomatic, but skips his metoprolol some days if his BP is low. Will reduce to 12.5mg toprol. He remains on eliquis for CVA prophylaxis (CHADSVASc 1).     4/14/2021: Overall he feels he is well controlled on flecainide. Only identifies breakthrough when he misses or delays meds. Does notice fast HR while exercising - likely SR given gradual trend on monitor. He would prefer to continue current regimen for now if it is safe. EKG with stable intervals. Update echo to confirm stability of heart function. Plan for PVI if EF is down or he identifies consistent AF breakthrough despite flecainide. CHADSVASc 1 and he remains on eliquis.     11/29/2021: Pt is out of rhythm today despite flecainide. He has had multiple AF breakthrough episodes. No overt symptoms but he does have a history of fatigue with AF and given his young age, rhythm control is recommended. PVI previously recommended by Dr. Shell. We discussed risks, benefits, and alternatives to PVI. Pt agrees to proceed. In interim will obtain Holter to determine whether his arrhythmia is paroxysmal or persistent. If persistent - will stop flecainide now. CHADSVASC 1 on eliquis.     1/12/2022:  Successful CTI ablation. Successful pulmonary vein RF ablation.     3/16/2022: Pt is 2 mo s/p PVI and CTI-line. He is doing well from a rhythm standpoint, with no documented or symptomatic recurrence of arrhythmia since procedure. He does report more activity tolerance. Never felt palpitations. Feels well.  Reduce flecainide to 50mg BID. CHADSVASc 1 on xarelto.      8/24/2022: He is now 7 months s/p PVI and CTI-line. Continues to feel well, with no documented or symptomatic recurrence of arrhythmia since procedure.  Has restarted exercise without any limitations in activity tolerance. Possible junctional rhythm on EKG reverting to SR on rhythm strip. Asymptomatic.  Will stop flecainide now and recheck EKG in 4 weeks. If WNL, RTC 6 mo. CHADSVASc 1 on xarelto. We discussed AF  monitoring at home.     1/26/2023: He is in an atypical AFL, appears rate controlled. He desires redo ablation. Recommend continuing with extended holter for now to ensure he is rate controlled     2/10/2023 holter monitor: Paroxysmal atrial fibrillation and flutter. RVR most noted with atrial flutter. AF burden ~78%. He elected for redo-ablation.     4/28/2023: Mitral annular flutter diagnosed and underwent lateral mitral isthmus ablation with bidirectional block, repeat ablation of CTI for loss of CTI block, re-isolation of RSPV/RIPV and LA posterior wall isolation.    Interim Hx:  Mr. Sofia returns for post-ablation follow-up. He feels great. BP not elevated at home. Looking back he rarely has BP readings that would be considered HTN, mostly 110s-120s/80s. He is on 25mg of metoprolol.    In clinic ECG notes sinus bradycardia with narrow QRS. Rate 49 bpm.       Review of Systems   Constitutional: Negative for fever and malaise/fatigue.   HENT:  Negative for congestion and sore throat.    Eyes:  Negative for blurred vision and visual disturbance.   Cardiovascular:  Negative for chest pain, dyspnea on exertion, irregular heartbeat, near-syncope, palpitations and syncope.   Respiratory:  Negative for cough and shortness of breath.    Hematologic/Lymphatic: Negative for bleeding problem. Does not bruise/bleed easily.   Skin: Negative.    Musculoskeletal: Negative.    Gastrointestinal:  Negative for bloating, abdominal pain, hematochezia and melena.   Neurological:  Negative for focal weakness and weakness.   Psychiatric/Behavioral: Negative.       Objective:   Physical Exam  Vitals reviewed.   Constitutional:       General: He is not in acute distress.     Appearance: He is well-developed. He is not diaphoretic.   HENT:      Head: Normocephalic and atraumatic.   Eyes:      General:         Right eye: No discharge.         Left eye: No discharge.      Conjunctiva/sclera: Conjunctivae normal.   Cardiovascular:       Rate and Rhythm: Regular rhythm. Bradycardia present.      Heart sounds: No murmur heard.    No friction rub. No gallop.   Pulmonary:      Effort: Pulmonary effort is normal. No respiratory distress.      Breath sounds: Normal breath sounds. No wheezing or rales.   Abdominal:      General: Bowel sounds are normal. There is no distension.      Palpations: Abdomen is soft.      Tenderness: There is no abdominal tenderness.   Musculoskeletal:      Cervical back: Neck supple.   Skin:     General: Skin is warm and dry.   Neurological:      Mental Status: He is alert and oriented to person, place, and time.   Psychiatric:         Behavior: Behavior normal.         Thought Content: Thought content normal.         Judgment: Judgment normal.       Assessment:      1. Paroxysmal atrial fibrillation    2. Atypical atrial flutter        Plan:     Mr. Sofia is a pleasant 58 year-old man with paroxysmal AF, typical AFL, and atypical AFL s/p PVI/CTI ablation 1/2022 with recurrence and then s/p redo-PVI/mitral isthmus ablation/LA posterior wall isolation/repeat CTI ablation 4/2023. He has remained in sinus rhythm. Looking at all of his BP readings I question HTN diagnosis. He measures his BP at home and he is 110s-120s/80s. Very low dose metoprolol is unlikely to create these numbers. [id number] is essentially 0. Would stop metoprolol/xarelto. He will continue to monitor his BP at home and if he starts to consistently get readings in the HTN range then would resume therapy and discuss resumption of xarelto.    RTC in 6 months    Thank you for allowing me to participate in the care of this patient. Please do not hesitate to call me with any questions or concerns.    Scotty Shell MD, PhD  Cardiac Electrophysiology

## 2023-08-10 ENCOUNTER — TELEPHONE (OUTPATIENT)
Dept: ENDOSCOPY | Facility: HOSPITAL | Age: 58
End: 2023-08-10

## 2023-08-10 ENCOUNTER — CLINICAL SUPPORT (OUTPATIENT)
Dept: ENDOSCOPY | Facility: HOSPITAL | Age: 58
End: 2023-08-10
Attending: INTERNAL MEDICINE
Payer: COMMERCIAL

## 2023-08-10 VITALS — WEIGHT: 215 LBS | HEIGHT: 72 IN | BODY MASS INDEX: 29.12 KG/M2

## 2023-08-10 DIAGNOSIS — Z12.11 COLON CANCER SCREENING: ICD-10-CM

## 2023-08-10 NOTE — PLAN OF CARE
Spoke to patient to schedule procedure(s) Colonoscopy       Physician to perform procedure(s) Dr. ANGELITO Patel  Date of Procedure (s) 11/6/2023  Arrival Time 07:00 AM  Time of Procedure(s) 08:00 AM   Location of Procedure(s) [place] 2nd Floor  Type of Rx Prep sent to patient: PEG  Instructions provided to patient via [facility]    Patient was informed on the following information and verbalized understanding. Screening questionnaire reviewed with patient and complete. If procedure requires anesthesia, a responsible adult needs to be present to accompany the patient home, patient cannot drive after receiving anesthesia. Appointment details are tentative, especially check-in time. Patient will receive a prep-op call 4 days prior to confirm check-in time for procedure. If applicable the patient should contact their pharmacy to verify Rx for procedure prep is ready for pick-up. Patient was advised to call the scheduling department at [phone] if pharmacy states no Rx is available. Patient was advised to call the endoscopy scheduling department if any questions or concerns arise.      [facility] Endoscopy Scheduling Department

## 2023-08-10 NOTE — TELEPHONE ENCOUNTER
Spoke to patient to schedule procedure(s) Colonoscopy       Physician to perform procedure(s) Dr. ANGELITO Patel  Date of Procedure (s) 11/6/2023  Arrival Time 07:00 AM  Time of Procedure(s) 08:00 AM   Location of Procedure(s) [place] [facility]  Type of Rx Prep sent to patient: PEG  Instructions provided to patient via [facility]    Patient was informed on the following information and verbalized understanding. Screening questionnaire reviewed with patient and complete. If procedure requires anesthesia, a responsible adult needs to be present to accompany the patient home, patient cannot drive after receiving anesthesia. Appointment details are tentative, especially check-in time. Patient will receive a prep-op call 4 days prior to confirm check-in time for procedure. If applicable the patient should contact their pharmacy to verify Rx for procedure prep is ready for pick-up. Patient was advised to call the scheduling department at [phone] if pharmacy states no Rx is available. Patient was advised to call the endoscopy scheduling department if any questions or concerns arise.      [facility]oscopy Scheduling Department

## 2023-10-16 ENCOUNTER — TELEPHONE (OUTPATIENT)
Dept: ENDOSCOPY | Facility: HOSPITAL | Age: 58
End: 2023-10-16
Payer: COMMERCIAL

## 2023-10-16 NOTE — TELEPHONE ENCOUNTER
Contacted Pt due to change in Dr. Patel' scoping schedule.   Spoke to Pt to reschedule procedure(s) Colonoscopy       Physician to perform procedure(s) Dr. JACKIE Montiel  Date of Procedure (s) 1/18/24  Arrival Time 8:15 AM  Time of Procedure(s) 9:15 AM   Location of Procedure(s) [place] 4th Floor  Type of Rx Prep sent to patient: PEG  Instructions provided to patient via [facility]    Patient was informed on the following information and verbalized understanding. Screening questionnaire reviewed with patient and complete. If procedure requires anesthesia, a responsible adult needs to be present to accompany the patient home, patient cannot drive after receiving anesthesia. Appointment details are tentative, especially check-in time. Patient will receive a prep-op call 4 days prior to confirm check-in time for procedure. If applicable the patient should contact their pharmacy to verify Rx for procedure prep is ready for pick-up. Patient was advised to call the scheduling department at [phone] if pharmacy states no Rx is available. Patient was advised to call the endoscopy scheduling department if any questions or concerns arise.       Endoscopy Scheduling Department

## 2023-12-21 NOTE — Clinical Note
Ablation Location: Left Atrium at the pulmonary veins. Thank you for taking the time to discuss your health with me today!    Today we discussed:  Please return in 6 weeks for repeat H pylori testing.  2.  We will check your lipid level today, and I will call you with abnormal results.  3. We will plan to complete your mammogram and PAP in the spring.     As always, please call the clinic or message with any questions or concerns.     Best Wishes,  Puja Guerrero MD.         Preventive Health Recommendations  Female Ages 40 to 49    Yearly exam:   See your health care provider every year in order to  Review health changes.   Discuss preventive care.    Review your medicines if your doctor prescribed any.    Get a Pap test every three years (unless you have an abnormal result and your provider advises testing more often).    If you get Pap tests with HPV test, you only need to test every 5 years, unless you have an abnormal result. You do not need a Pap test if your uterus was removed (hysterectomy) and you have not had cancer.    You should be tested each year for STDs (sexually transmitted diseases), if you're at risk.   Ask your doctor if you should have a mammogram.    Have a colonoscopy (test for colon cancer) beginning at age 45.  Ask your provider about other options like a yearly FIT test or Cologuard test every 3 years (stool tests)      Have a cholesterol test every 5 years.     Have a diabetes test (fasting glucose) after age 45. If you are at risk for diabetes, you should have this test every 3 years.    Shots: Get a flu shot each year. Get a tetanus shot every 10 years.     Nutrition:   Eat at least 5 servings of fruits and vegetables each day.  Eat whole-grain bread, whole-wheat pasta and brown rice instead of white grains and rice.  Get adequate Calcium and Vitamin D.      Lifestyle  Exercise at least 150 minutes a week (an average of 30 minutes a day, 5 days a week). This will help you control your weight and prevent disease.  Limit alcohol to one drink  per day.  No smoking.   Wear sunscreen to prevent skin cancer.  See your dentist every six months for an exam and cleaning.

## 2024-01-11 ENCOUNTER — TELEPHONE (OUTPATIENT)
Dept: ENDOSCOPY | Facility: HOSPITAL | Age: 59
End: 2024-01-11
Payer: COMMERCIAL

## 2024-01-18 ENCOUNTER — HOSPITAL ENCOUNTER (OUTPATIENT)
Facility: HOSPITAL | Age: 59
Discharge: HOME | End: 2024-01-18
Attending: COLON & RECTAL SURGERY | Admitting: COLON & RECTAL SURGERY
Payer: COMMERCIAL

## 2024-01-18 ENCOUNTER — ANESTHESIA (OUTPATIENT)
Dept: ENDOSCOPY | Facility: HOSPITAL | Age: 59
End: 2024-01-18
Payer: COMMERCIAL

## 2024-01-18 ENCOUNTER — ANESTHESIA EVENT (OUTPATIENT)
Dept: ENDOSCOPY | Facility: HOSPITAL | Age: 59
End: 2024-01-18
Payer: COMMERCIAL

## 2024-01-18 VITALS
WEIGHT: 225 LBS | BODY MASS INDEX: 30.48 KG/M2 | HEIGHT: 72 IN | SYSTOLIC BLOOD PRESSURE: 117 MMHG | TEMPERATURE: 98 F | HEART RATE: 72 BPM | DIASTOLIC BLOOD PRESSURE: 79 MMHG | RESPIRATION RATE: 16 BRPM | OXYGEN SATURATION: 95 %

## 2024-01-18 DIAGNOSIS — Z12.11 COLON CANCER SCREENING: ICD-10-CM

## 2024-01-18 PROCEDURE — G0121 COLON CA SCRN NOT HI RSK IND: HCPCS | Performed by: COLON & RECTAL SURGERY

## 2024-01-18 PROCEDURE — G0121 COLON CA SCRN NOT HI RSK IND: HCPCS | Mod: ,,, | Performed by: COLON & RECTAL SURGERY

## 2024-01-18 PROCEDURE — E9220 PRA ENDO ANESTHESIA: HCPCS | Mod: ,,, | Performed by: NURSE ANESTHETIST, CERTIFIED REGISTERED

## 2024-01-18 PROCEDURE — 37000009 HC ANESTHESIA EA ADD 15 MINS: Performed by: COLON & RECTAL SURGERY

## 2024-01-18 PROCEDURE — 25000003 PHARM REV CODE 250: Performed by: NURSE ANESTHETIST, CERTIFIED REGISTERED

## 2024-01-18 PROCEDURE — 25000003 PHARM REV CODE 250: Performed by: COLON & RECTAL SURGERY

## 2024-01-18 PROCEDURE — 63600175 PHARM REV CODE 636 W HCPCS: Performed by: NURSE ANESTHETIST, CERTIFIED REGISTERED

## 2024-01-18 PROCEDURE — 37000008 HC ANESTHESIA 1ST 15 MINUTES: Performed by: COLON & RECTAL SURGERY

## 2024-01-18 RX ORDER — PROPOFOL 10 MG/ML
VIAL (ML) INTRAVENOUS
Status: DISCONTINUED | OUTPATIENT
Start: 2024-01-18 | End: 2024-01-19

## 2024-01-18 RX ORDER — SODIUM CHLORIDE 9 MG/ML
INJECTION, SOLUTION INTRAVENOUS CONTINUOUS
Status: DISCONTINUED | OUTPATIENT
Start: 2024-01-18 | End: 2024-01-18 | Stop reason: HOSPADM

## 2024-01-18 RX ORDER — LIDOCAINE HYDROCHLORIDE 20 MG/ML
INJECTION INTRAVENOUS
Status: DISCONTINUED | OUTPATIENT
Start: 2024-01-18 | End: 2024-01-19

## 2024-01-18 RX ADMIN — PROPOFOL 70 MG: 10 INJECTION, EMULSION INTRAVENOUS at 09:01

## 2024-01-18 RX ADMIN — SODIUM CHLORIDE: 0.9 INJECTION, SOLUTION INTRAVENOUS at 09:01

## 2024-01-18 RX ADMIN — LIDOCAINE HYDROCHLORIDE 100 MG: 20 INJECTION INTRAVENOUS at 09:01

## 2024-01-18 NOTE — PROVATION PATIENT INSTRUCTIONS
Discharge Summary/Instructions after an Endoscopic Procedure  Patient Name: Jhonatan Sofia  Patient MRN: [id number]  Patient YOB: 1965  Thursday, January 18, 2024  Shukri Urbina MD  Dear patient,  As a result of recent federal legislation (The Federal Cures Act), you may   receive lab or pathology results from your procedure in your [facility]   account before your physician is able to contact you. Your physician or   their representative will relay the results to you with their   recommendations at their soonest availability.  Thank you,  RESTRICTIONS:  During your procedure today, you received medications for sedation.  These   medications may affect your judgment, balance and coordination.  Therefore,   for 24 hours, you have the following restrictions:   - DO NOT drive a car, operate machinery, make legal/financial decisions,   sign important papers or drink alcohol.    ACTIVITY:  Today: no heavy lifting, straining or running due to procedural   sedation/anesthesia.  The following day: return to full activity including work.  DIET:  Eat and drink normally unless instructed otherwise.     TREATMENT FOR COMMON SIDE EFFECTS:  - Mild abdominal pain, nausea, belching, bloating or excessive gas:  rest,   eat lightly and use a heating pad.  - Sore Throat: treat with throat lozenges and/or gargle with warm salt   water.  - Because air was used during the procedure, expelling large amounts of air   from your rectum or belching is normal.  - If a bowel prep was taken, you may not have a bowel movement for 1-3 days.    This is normal.  SYMPTOMS TO WATCH FOR AND REPORT TO YOUR PHYSICIAN:  1. Abdominal pain or bloating, other than gas cramps.  2. Chest pain.  3. Back pain.  4. Signs of infection such as: chills or fever occurring within 24 hours   after the procedure.  5. Rectal bleeding, which would show as bright red, maroon, or black stools.   (A tablespoon of blood from the rectum is not serious, especially if    hemorrhoids are present.)  6. Vomiting.  7. Weakness or dizziness.  GO DIRECTLY TO THE NEAREST EMERGENCY ROOM IF YOU HAVE ANY OF THE FOLLOWING:      Difficulty breathing              Chills and/or fever over 101 F   Persistent vomiting and/or vomiting blood   Severe abdominal pain   Severe chest pain   Black, tarry stools   Bleeding- more than one tablespoon   Any other symptom or condition that you feel may need urgent attention  Your doctor recommends these additional instructions:  If any biopsies were taken, your doctors clinic will contact you in 1 to 2   weeks with any results.  - Discharge patient to home.   - Resume previous diet.   - Continue present medications.   - Patient has a contact number available for emergencies.  The signs and   symptoms of potential delayed complications were discussed with the   patient.  Return to normal activities tomorrow.  Written discharge   instructions were provided to the patient.   - Repeat colonoscopy in 10 years for screening purposes.  For questions, problems or results please call your physician - Shukri Urbina MD at Work:  [phone].  [facility], EMERGENCY ROOM PHONE NUMBER: [phone]  IF A COMPLICATION OR EMERGENCY SITUATION ARISES AND YOU ARE UNABLE TO REACH   YOUR PHYSICIAN - GO DIRECTLY TO THE EMERGENCY ROOM.  Shukri Urbina MD  1/18/2024 10:12:27 AM  This report has been verified and signed electronically.  Dear patient,  As a result of recent federal legislation (The Federal Cures Act), you may   receive lab or pathology results from your procedure in your [facility]   account before your physician is able to contact you. Your physician or   their representative will relay the results to you with their   recommendations at their soonest availability.  Thank you,  PROVATION

## 2024-01-18 NOTE — ANESTHESIA PREPROCEDURE EVALUATION
01/18/2024  Jhonatan Sofia is a 58 y.o., male.  Past Medical History:   Diagnosis Date    Anticoagulant long-term use     Atrial fibrillation     Hyperlipidemia     Hypertension     Kidney stones      Past Surgical History:   Procedure Laterality Date    ABLATION OF ARRHYTHMOGENIC FOCUS FOR ATRIAL FIBRILLATION N/A 1/12/2022    Procedure: Ablation atrial fibrillation;  Surgeon: Scotty Shell MD;  Location: [facility] EP LAB;  Service: Cardiology;  Laterality: N/A;  AF/AFL, RADHIKA (Cx if SR), PVI, CTI, RFA, Carto, Gen, IA, 3 Prep    ABLATION OF ARRHYTHMOGENIC FOCUS FOR ATRIAL FIBRILLATION N/A 4/28/2023    Procedure: Ablation atrial fibrillation;  Surgeon: Scotty Shell MD;  Location: [facility] EP LAB;  Service: Cardiology;  Laterality: N/A;  AF/AFL, RADHIKA (Cx if SR), PVI redo, RFA for Atyp AFL, Carto, Gen, IA, 3 Prep    ABLATION, ATRIAL FLUTTER, ATYPICAL N/A 4/28/2023    Procedure: Ablation, Atrial Flutter, Atypical;  Surgeon: Scotty Shell MD;  Location: [facility] EP LAB;  Service: Cardiology;  Laterality: N/A;    ABLATION, ATRIAL FLUTTER, TYPICAL  4/28/2023    Procedure: Ablation, Atrial Flutter, Typical;  Surgeon: Scotty Shell MD;  Location: [facility] EP LAB;  Service: Cardiology;;    CARDIOVERSION      SHOULDER SURGERY      TREATMENT OF CARDIAC ARRHYTHMIA N/A 8/6/2020    Procedure: CARDIOVERSION;  Surgeon: Scotty Shell MD;  Location: [facility] EP LAB;  Service: Cardiology;  Laterality: N/A;  AF, RADHIKA, DCCV, MAC, IA, 3 Prep     Patient Active Problem List   Diagnosis    Paroxysmal atrial fibrillation    Atrial fibrillation    History of cardioversion    Anticoagulant long-term use    History of radiofrequency ablation (RFA) procedure for cardiac arrhythmia    Atypical atrial flutter           Pre-op Assessment    I have reviewed the Patient Summary Reports.    I have reviewed the NPO Status.   I have reviewed the Medications.      Review of Systems  Anesthesia Hx:  No problems with previous Anesthesia             Denies Family Hx of Anesthesia complications.    Denies Personal Hx of Anesthesia complications.                    Hematology/Oncology:  Hematology Normal   Oncology Normal                                   EENT/Dental:  EENT/Dental Normal           Cardiovascular:     Hypertension               H/o afib                         Pulmonary:  Pulmonary Normal                       Renal/:  Chronic Renal Disease renal calculi               Hepatic/GI:  Hepatic/GI Normal Bowel Prep.                Musculoskeletal:  Musculoskeletal Normal                Neurological:  Neurology Normal                                      Endocrine:  Endocrine Normal            Dermatological:  Skin Normal    Psych:  Psychiatric Normal                    Physical Exam  General: Well nourished, Cooperative, Alert and Oriented    Airway:  Mallampati: I / I  Mouth Opening: Normal  TM Distance: Normal  Tongue: Normal  Neck ROM: Normal ROM    Dental:  Intact    Chest/Lungs:  Clear to auscultation, Normal Respiratory Rate    Heart:  Rate: Normal  Rhythm: Regular Rhythm  Sounds: Normal    Abdomen:  Normal, Soft, Nontender        Anesthesia Plan  Type of Anesthesia, risks & benefits discussed:    Anesthesia Type: Gen Natural Airway, MAC  Intra-op Monitoring Plan: Standard ASA Monitors  Post Op Pain Control Plan: multimodal analgesia  Induction:  IV  Informed Consent: Informed consent signed with the Patient and all parties understand the risks and agree with anesthesia plan.  All questions answered.   ASA Score: 3  Day of Surgery Review of History & Physical: I have interviewed and examined the patient. I have reviewed the patient's H&P dated:     Ready For Surgery From Anesthesia Perspective.     .

## 2024-01-18 NOTE — H&P
Colonoscopy History and Physical      Procedure : Colonoscopy    Indications:  asymptomatic screening exam    Family Hx of CRC: None    Last Colonoscopy:  Never    Hx of sedation problems: none  FHX of sedation problems: none    Past Medical History:   Diagnosis Date    Anticoagulant long-term use     Atrial fibrillation     Hyperlipidemia     Hypertension     Kidney stones        Family History   Problem Relation Age of Onset    Heart disease Father     Kidney failure Father     Liver disease Sister         fatty liver    Arthritis Sister     Arthritis Sister        Social History     Socioeconomic History    Marital status:     Number of children: 1   Occupational History     Employer: [employer]   Tobacco Use    Smoking status: Never    Smokeless tobacco: Never   Substance and Sexual Activity    Alcohol use: Yes     Comment: socially    Drug use: Never     Social Determinants of Health     Financial Resource Strain: Unknown (8/24/2022)    Overall Financial Resource Strain (CARDIA)     Difficulty of Paying Living Expenses: Patient declined   Food Insecurity: Unknown (8/24/2022)    Hunger Vital Sign     Worried About Running Out of Food in the Last Year: Patient declined     Ran Out of Food in the Last Year: Patient declined   Transportation Needs: Unknown (2/5/2023)    PRAPARE - Transportation     Lack of Transportation (Non-Medical): Patient declined   Physical Activity: Unknown (2/5/2023)    Exercise Vital Sign     Days of Exercise per Week: 5 days   Stress: No Stress Concern Present (2/5/2023)    [employer] - Occupational Stress Questionnaire     Feeling of Stress : Not at all   Social Connections: Unknown (2/5/2023)    Social Connection and Isolation Panel [NHANES]     Active Member of Clubs or Organizations: Patient declined     Marital Status: Patient declined   Housing Stability: Unknown (2/5/2023)    Housing Stability Vital Sign     Unstable Housing in the Last Year:  Patient refused       Review of patient's allergies indicates:  No Known Allergies    Current Facility-Administered Medications on File Prior to Encounter   Medication Dose Route Frequency Provider Last Rate Last Admin    sodium chloride 0.9% bolus 1,000 mL  1,000 mL Intravenous Once Becky Quinones, HELADIO        sodium chloride 0.9% bolus 1,000 mL  1,000 mL Intravenous Once Becky Quinones, NP         No current outpatient medications on file prior to encounter.       Review of Systems -   Respiratory ROS: negative  Cardiovascular ROS: negative  Gastrointestinal ROS: negative  Musculoskeletal ROS: negative  Neurological ROS: negative        Physical Exam:  General: no distress  Head: normocephalic  Lungs:  normal respiratory effort  Heart: regular rate  Abdomen: soft,  Non-tender  Extremities: warm and well perfused       Deep Sedation: Mallampati Score per anesthesia    ASA: III        Anesthesia/Surgery risks, benefits, and alternative options discussed and understood by patient/family.

## 2024-01-19 NOTE — TRANSFER OF CARE
Anesthesia Transfer of Care Note    Patient: Jhonatan Sofia    Procedure(s) Performed: Procedure(s) (LRB):  COLONOSCOPY (N/A)    Patient location: GI    Anesthesia Type: general    Transport from OR: Transported from OR on room air with adequate spontaneous ventilation    Post pain: adequate analgesia    Post assessment: no apparent anesthetic complications and tolerated procedure well    Post vital signs: stable    Level of consciousness: awake, alert and oriented    Nausea/Vomiting: no nausea/vomiting    Complications: none    Transfer of care protocol was followed      Last vitals: Visit Vitals  /79   Pulse 72   Temp 36.6 °C (97.9 °F) (Temporal)   Resp 16   Ht 6' (1.829 m)   Wt 102.1 kg (225 lb)   SpO2 95%   BMI 30.52 kg/m²

## 2024-01-22 NOTE — ANESTHESIA POSTPROCEDURE EVALUATION
Anesthesia Post Evaluation    Patient: Jhonatan Sofia    Procedure(s) Performed: Procedure(s) (LRB):  COLONOSCOPY (N/A)    Final Anesthesia Type: general      Patient location during evaluation: PACU  Patient participation: Yes- Able to Participate  Level of consciousness: awake and alert  Post-procedure vital signs: reviewed and stable  Pain management: adequate  Airway patency: patent    PONV status at discharge: No PONV  Anesthetic complications: no      Cardiovascular status: blood pressure returned to baseline  Respiratory status: unassisted  Hydration status: euvolemic  Follow-up not needed.              Vitals Value Taken Time   /79 01/18/24 1033   Temp 36.6 °C (97.9 °F) 01/18/24 1013   Pulse 72 01/18/24 1033   Resp 16 01/18/24 1033   SpO2 95 % 01/18/24 1033         Event Time   Out of Recovery 10:42:58         Pain/Alex Score: No data recorded

## 2024-04-01 ENCOUNTER — TELEPHONE (OUTPATIENT)
Dept: ELECTROPHYSIOLOGY | Facility: CLINIC | Age: 59
End: 2024-04-01
Payer: COMMERCIAL

## 2024-04-01 DIAGNOSIS — I48.0 PAROXYSMAL ATRIAL FIBRILLATION: Primary | ICD-10-CM

## 2024-05-01 DIAGNOSIS — I10 ESSENTIAL HYPERTENSION: ICD-10-CM

## 2024-06-10 ENCOUNTER — PATIENT MESSAGE (OUTPATIENT)
Dept: INTERNAL MEDICINE | Facility: CLINIC | Age: 59
End: 2024-06-10
Payer: COMMERCIAL

## 2025-01-06 NOTE — Clinical Note
All sheaths were removed.    What Type Of Note Output Would You Prefer (Optional)?: Standard Output How Severe Is Your Skin Lesion?: mild Has Your Skin Lesion Been Treated?: not been treated Is This A New Presentation, Or A Follow-Up?: Skin Lesion Which Family Member (Optional)?: Maternal grandmother

## 2025-03-17 ENCOUNTER — TELEPHONE (OUTPATIENT)
Dept: ELECTROPHYSIOLOGY | Facility: CLINIC | Age: 60
End: 2025-03-17
Payer: COMMERCIAL

## 2025-03-17 ENCOUNTER — PATIENT MESSAGE (OUTPATIENT)
Dept: ELECTROPHYSIOLOGY | Facility: CLINIC | Age: 60
End: 2025-03-17
Payer: COMMERCIAL

## 2025-03-17 NOTE — TELEPHONE ENCOUNTER
Spoke with patient who states that he had an EKG done at work today and was told that he is back in Afib and was sent home and instructed to contact his doctor. Patient states that he is unable to return to work until cleared by the doctor. Patient has a history of PVI/CTI ablation 1/2022 with recurrence and then s/p redo-PVI/mitral isthmus ablation/LA posterior wall isolation/repeat CTI ablation 4/2023. He was last seen in office in June /2023. Patient reports that he is currently asymptomatic, with heart rate of 72.  States that in the past he has been unaware of when he was in Afib. Currently not on any medication. Patient states that he had the EKG that was completed today and will upload to the portal for review. Advised that once reviewed he will be contacted to discuss further recommendations and will ask our staff to contact him to schedule a follow up appointment based on recommendations once received.

## 2025-03-17 NOTE — TELEPHONE ENCOUNTER
----- Message from Britt sent at 3/17/2025  3:00 PM CDT -----  Regarding: AFIB  Pt states that he is back in AFIB and would like to speak with you.  He can be reached at [phone].Thank you

## 2025-03-18 ENCOUNTER — TELEPHONE (OUTPATIENT)
Dept: ELECTROPHYSIOLOGY | Facility: CLINIC | Age: 60
End: 2025-03-18
Payer: COMMERCIAL

## 2025-03-18 ENCOUNTER — PATIENT MESSAGE (OUTPATIENT)
Dept: ELECTROPHYSIOLOGY | Facility: CLINIC | Age: 60
End: 2025-03-18
Payer: COMMERCIAL

## 2025-03-18 DIAGNOSIS — I48.0 PAROXYSMAL ATRIAL FIBRILLATION: Primary | ICD-10-CM

## 2025-03-18 DIAGNOSIS — I48.4 ATYPICAL ATRIAL FLUTTER: ICD-10-CM

## 2025-03-18 NOTE — TELEPHONE ENCOUNTER
----- Message from Britt sent at 3/18/2025  1:48 PM CDT -----  Regarding: return call  Pt is returning your call and can be reached at [phone].Thank you

## 2025-03-18 NOTE — TELEPHONE ENCOUNTER
Spoke with patient and scheduled procedure: RADHIKA/DCCV on 3/26/25 with an arrival time of 11AM.   Informed pt that procedure will take place at [facility].   Informed pt that he will not be allowed to drive himself home post procedure due to receiving anesthesia during the procedure.   Instructed pt that he will need to be NPO after midnight on 3/25/25.  Labs to be done at: [facility] on 3/19/25; non-fasting.  Confirmed that patient is not on GLP-1 agonist  Confirmed that pt started taking Eliquis 5mg twice daily today. Instructed pt to continue taking Eliquis twice daily and to NOT miss any doses. Instructed pt to take Eliquis in the AM on 3/26/25 prior to arrival time with water.   Confirmed that patient does not have any implanted device that has remote or monitor that could cause electrical interference  Pt will have a 1 week post DCCV EKG on 4/2/25 at [facility].   Instructions will be sent via portal, as requested

## 2025-03-18 NOTE — TELEPHONE ENCOUNTER
Called pt to schedule RADHIKA/DCCV. No answer, left voicemail requesting a call back at [phone].

## 2025-03-18 NOTE — TELEPHONE ENCOUNTER
Spoke with patient and advised that Dr Shell has reviewed his EKG and recommends that he resume taking Eliquis 5 mg twice daily and proceed with RADHIKA/DCCV.  Given the option of being seen in clinic prior, however patient stated that he would prefer to just proceed with scheduling RADHIKA/DCCV. Advised that I will send his prescription for the Eliquis to his preferred pharmacy which should be started today, and he will be contacted later today or tomorrow to discuss scheduling the RADHIKA/DCCV. Understanding verbalized.

## 2025-03-19 ENCOUNTER — LAB VISIT (OUTPATIENT)
Dept: LAB | Facility: HOSPITAL | Age: 60
End: 2025-03-19
Attending: INTERNAL MEDICINE
Payer: COMMERCIAL

## 2025-03-19 DIAGNOSIS — I48.4 ATYPICAL ATRIAL FLUTTER: ICD-10-CM

## 2025-03-19 DIAGNOSIS — I48.0 PAROXYSMAL ATRIAL FIBRILLATION: ICD-10-CM

## 2025-03-19 LAB
ANION GAP SERPL CALC-SCNC: 12 MMOL/L (ref 8–16)
APTT PPP: 31.6 SEC (ref 21–32)
BUN SERPL-MCNC: 20 MG/DL (ref 6–20)
CALCIUM SERPL-MCNC: 9.8 MG/DL (ref 8.7–10.5)
CHLORIDE SERPL-SCNC: 109 MMOL/L (ref 95–110)
CO2 SERPL-SCNC: 22 MMOL/L (ref 23–29)
CREAT SERPL-MCNC: 1.2 MG/DL (ref 0.5–1.4)
ERYTHROCYTE [DISTWIDTH] IN BLOOD BY AUTOMATED COUNT: 13.1 % (ref 11.5–14.5)
EST. GFR  (NO RACE VARIABLE): >60 ML/MIN/1.73 M^2
GLUCOSE SERPL-MCNC: 100 MG/DL (ref 70–110)
HCT VFR BLD AUTO: 45.5 % (ref 40–54)
HGB BLD-MCNC: 15.2 G/DL (ref 14–18)
INR PPP: 1 (ref 0.8–1.2)
MCH RBC QN AUTO: 32.5 PG (ref 27–31)
MCHC RBC AUTO-ENTMCNC: 33.4 G/DL (ref 32–36)
MCV RBC AUTO: 97 FL (ref 82–98)
PLATELET # BLD AUTO: 229 K/UL (ref 150–450)
PMV BLD AUTO: 11.8 FL (ref 9.2–12.9)
POTASSIUM SERPL-SCNC: 4.5 MMOL/L (ref 3.5–5.1)
PROTHROMBIN TIME: 11.4 SEC (ref 9–12.5)
RBC # BLD AUTO: 4.68 M/UL (ref 4.6–6.2)
SODIUM SERPL-SCNC: 143 MMOL/L (ref 136–145)
WBC # BLD AUTO: 5.11 K/UL (ref 3.9–12.7)

## 2025-03-19 PROCEDURE — 85730 THROMBOPLASTIN TIME PARTIAL: CPT | Performed by: INTERNAL MEDICINE

## 2025-03-19 PROCEDURE — 85610 PROTHROMBIN TIME: CPT | Performed by: INTERNAL MEDICINE

## 2025-03-19 PROCEDURE — 36415 COLL VENOUS BLD VENIPUNCTURE: CPT | Performed by: INTERNAL MEDICINE

## 2025-03-19 PROCEDURE — 80048 BASIC METABOLIC PNL TOTAL CA: CPT | Performed by: INTERNAL MEDICINE

## 2025-03-19 PROCEDURE — 85027 COMPLETE CBC AUTOMATED: CPT | Performed by: INTERNAL MEDICINE

## 2025-03-21 ENCOUNTER — TELEPHONE (OUTPATIENT)
Dept: ELECTROPHYSIOLOGY | Facility: CLINIC | Age: 60
End: 2025-03-21
Payer: COMMERCIAL

## 2025-03-21 NOTE — TELEPHONE ENCOUNTER
Spoke to patient. Patient is scheduled for RADHIKA/DCCV on 3/26/2025. Patient states current job needs a letter from the office on when the patient is allowed to return to work prior to the cardioversion scheduled. Will inform Dr. Shell to see of recommendations.   Patient verbalized understanding and appreciated the call.     ----- Message from Med Assistant Rasmussen sent at 3/21/2025  8:34 AM CDT -----    ----- Message -----  From: Susana Patel  Sent: 3/21/2025   8:32 AM CDT  To: Suleman MAHER Staff    Patient is calling in regards to a written note for him staying off from work til after procedure is done or a note for him to remain on light duty. Also would like to know can he travel? He can be reached at [phone].Thank you

## 2025-03-24 ENCOUNTER — PATIENT MESSAGE (OUTPATIENT)
Dept: ELECTROPHYSIOLOGY | Facility: CLINIC | Age: 60
End: 2025-03-24
Payer: COMMERCIAL

## 2025-03-25 ENCOUNTER — TELEPHONE (OUTPATIENT)
Dept: ELECTROPHYSIOLOGY | Facility: CLINIC | Age: 60
End: 2025-03-25
Payer: COMMERCIAL

## 2025-03-25 ENCOUNTER — PATIENT MESSAGE (OUTPATIENT)
Dept: ELECTROPHYSIOLOGY | Facility: CLINIC | Age: 60
End: 2025-03-25
Payer: COMMERCIAL

## 2025-03-25 NOTE — TELEPHONE ENCOUNTER
Informed patient per Dr. Shell it is okay for patient to return to work prior to cardioversion. Patient verbalized understanding and appreciated the call.     ----- Message from Scotty Shell MD sent at 3/21/2025  2:25 PM CDT -----  yes  ----- Message -----  From: Gita Casper RN  Sent: 3/21/2025   2:11 PM CDT  To: Scotty Shell MD    Patient states that he is unable to return to work until cleared by the doctor. Patient has a history of PVI/CTI ablation 1/2022 with recurrence and then s/p redo-PVI/mitral isthmus ablation/LA posterior wall isolation/repeat CTI ablation 4/2023. He was last seen in office in June /2023. Patient reports that he is currently asymptomatic, with heart rate of 85 post walk. Found to be in Afib at work for an EKG. Patient is scheduled for RADHIKA/DCCV on 3/26/25. Is it okay for patient to return to work prior to cardioversion? Thanks, SREEKANTH Pelayo

## 2025-03-25 NOTE — TELEPHONE ENCOUNTER
2nd attempt to contact pt for pre-op for 3/26/25 procedure. No answer, left voicemail requesting a call back at [phone].

## 2025-03-25 NOTE — TELEPHONE ENCOUNTER
Called pt to confirm procedure date, arrival time and pre-procedure instructions. No answer, left message requesting a call back at [phone].

## 2025-03-26 ENCOUNTER — HOSPITAL ENCOUNTER (OUTPATIENT)
Dept: CARDIOLOGY | Facility: HOSPITAL | Age: 60
Discharge: HOME | End: 2025-03-26
Attending: INTERNAL MEDICINE
Payer: COMMERCIAL

## 2025-03-26 ENCOUNTER — ANESTHESIA (OUTPATIENT)
Dept: MEDSURG UNIT | Facility: HOSPITAL | Age: 60
End: 2025-03-26
Payer: COMMERCIAL

## 2025-03-26 ENCOUNTER — TELEPHONE (OUTPATIENT)
Dept: ELECTROPHYSIOLOGY | Facility: CLINIC | Age: 60
End: 2025-03-26
Payer: COMMERCIAL

## 2025-03-26 ENCOUNTER — ANESTHESIA EVENT (OUTPATIENT)
Dept: MEDSURG UNIT | Facility: HOSPITAL | Age: 60
End: 2025-03-26
Payer: COMMERCIAL

## 2025-03-26 ENCOUNTER — HOSPITAL ENCOUNTER (OUTPATIENT)
Facility: HOSPITAL | Age: 60
Discharge: HOME | End: 2025-03-26
Attending: INTERNAL MEDICINE | Admitting: INTERNAL MEDICINE
Payer: COMMERCIAL

## 2025-03-26 VITALS
TEMPERATURE: 98 F | SYSTOLIC BLOOD PRESSURE: 119 MMHG | HEART RATE: 63 BPM | RESPIRATION RATE: 18 BRPM | HEIGHT: 72 IN | BODY MASS INDEX: 30.48 KG/M2 | WEIGHT: 225 LBS | OXYGEN SATURATION: 98 % | DIASTOLIC BLOOD PRESSURE: 76 MMHG

## 2025-03-26 VITALS
HEART RATE: 66 BPM | BODY MASS INDEX: 30.48 KG/M2 | DIASTOLIC BLOOD PRESSURE: 82 MMHG | HEIGHT: 72 IN | SYSTOLIC BLOOD PRESSURE: 146 MMHG | WEIGHT: 225 LBS

## 2025-03-26 DIAGNOSIS — I48.4 ATYPICAL ATRIAL FLUTTER: ICD-10-CM

## 2025-03-26 DIAGNOSIS — I48.0 PAROXYSMAL ATRIAL FIBRILLATION: ICD-10-CM

## 2025-03-26 DIAGNOSIS — I48.0 PAROXYSMAL ATRIAL FIBRILLATION: Primary | ICD-10-CM

## 2025-03-26 DIAGNOSIS — I48.91 ATRIAL FIBRILLATION: ICD-10-CM

## 2025-03-26 LAB
ASCENDING AORTA: 3 CM
BSA FOR ECHO PROCEDURE: 2.28 M2
EJECTION FRACTION: 25 %
LAA PV: 43 CM/S
OHS QRS DURATION: 80 MS
OHS QRS DURATION: 88 MS
OHS QTC CALCULATION: 449 MS
OHS QTC CALCULATION: 450 MS
SINUS: 3.7 CM
STJ: 2.9 CM

## 2025-03-26 PROCEDURE — 93005 ELECTROCARDIOGRAM TRACING: CPT

## 2025-03-26 PROCEDURE — 37000009 HC ANESTHESIA EA ADD 15 MINS: Performed by: INTERNAL MEDICINE

## 2025-03-26 PROCEDURE — 93010 ELECTROCARDIOGRAM REPORT: CPT | Mod: ,,, | Performed by: INTERNAL MEDICINE

## 2025-03-26 PROCEDURE — 93312 ECHO TRANSESOPHAGEAL: CPT | Mod: 26,,, | Performed by: INTERNAL MEDICINE

## 2025-03-26 PROCEDURE — 93320 DOPPLER ECHO COMPLETE: CPT

## 2025-03-26 PROCEDURE — 25000003 PHARM REV CODE 250: Performed by: NURSE ANESTHETIST, CERTIFIED REGISTERED

## 2025-03-26 PROCEDURE — 92960 CARDIOVERSION ELECTRIC EXT: CPT | Mod: ,,, | Performed by: INTERNAL MEDICINE

## 2025-03-26 PROCEDURE — 93325 DOPPLER ECHO COLOR FLOW MAPG: CPT | Mod: 26,,, | Performed by: INTERNAL MEDICINE

## 2025-03-26 PROCEDURE — 92960 CARDIOVERSION ELECTRIC EXT: CPT | Performed by: INTERNAL MEDICINE

## 2025-03-26 PROCEDURE — 93010 ELECTROCARDIOGRAM REPORT: CPT | Mod: XE,,, | Performed by: INTERNAL MEDICINE

## 2025-03-26 PROCEDURE — 93320 DOPPLER ECHO COMPLETE: CPT | Mod: 26,,, | Performed by: INTERNAL MEDICINE

## 2025-03-26 PROCEDURE — 63600175 PHARM REV CODE 636 W HCPCS: Performed by: NURSE ANESTHETIST, CERTIFIED REGISTERED

## 2025-03-26 PROCEDURE — 37000008 HC ANESTHESIA 1ST 15 MINUTES: Performed by: INTERNAL MEDICINE

## 2025-03-26 RX ORDER — FENTANYL CITRATE 50 UG/ML
25 INJECTION, SOLUTION INTRAMUSCULAR; INTRAVENOUS EVERY 5 MIN PRN
Status: DISCONTINUED | OUTPATIENT
Start: 2025-03-26 | End: 2025-03-26 | Stop reason: HOSPADM

## 2025-03-26 RX ORDER — PROPOFOL 10 MG/ML
VIAL (ML) INTRAVENOUS
Status: DISCONTINUED | OUTPATIENT
Start: 2025-03-26 | End: 2025-03-26

## 2025-03-26 RX ORDER — PHENYLEPHRINE HYDROCHLORIDE 10 MG/ML
INJECTION INTRAVENOUS
Status: DISCONTINUED | OUTPATIENT
Start: 2025-03-26 | End: 2025-03-26

## 2025-03-26 RX ORDER — SODIUM CHLORIDE 0.9 % (FLUSH) 0.9 %
3 SYRINGE (ML) INJECTION
Status: DISCONTINUED | OUTPATIENT
Start: 2025-03-26 | End: 2025-03-26 | Stop reason: HOSPADM

## 2025-03-26 RX ORDER — PHENYLEPHRINE HCL IN 0.9% NACL 1 MG/10 ML
SYRINGE (ML) INTRAVENOUS
Status: DISCONTINUED | OUTPATIENT
Start: 2025-03-26 | End: 2025-03-26

## 2025-03-26 RX ORDER — GLUCAGON 1 MG
1 KIT INJECTION
Status: DISCONTINUED | OUTPATIENT
Start: 2025-03-26 | End: 2025-03-26 | Stop reason: HOSPADM

## 2025-03-26 RX ORDER — LIDOCAINE HYDROCHLORIDE 20 MG/ML
INJECTION, SOLUTION EPIDURAL; INFILTRATION; INTRACAUDAL; PERINEURAL
Status: DISCONTINUED | OUTPATIENT
Start: 2025-03-26 | End: 2025-03-26

## 2025-03-26 RX ORDER — DEXMEDETOMIDINE HYDROCHLORIDE 100 UG/ML
INJECTION, SOLUTION INTRAVENOUS
Status: DISCONTINUED | OUTPATIENT
Start: 2025-03-26 | End: 2025-03-26

## 2025-03-26 RX ADMIN — DEXMEDETOMIDINE 12 MCG: 200 INJECTION, SOLUTION INTRAVENOUS at 11:03

## 2025-03-26 RX ADMIN — SODIUM CHLORIDE: 0.9 INJECTION, SOLUTION INTRAVENOUS at 11:03

## 2025-03-26 RX ADMIN — PROPOFOL 50 MG: 10 INJECTION, EMULSION INTRAVENOUS at 11:03

## 2025-03-26 RX ADMIN — PHENYLEPHRINE HYDROCHLORIDE 100 MCG: 10 INJECTION INTRAVENOUS at 11:03

## 2025-03-26 RX ADMIN — LIDOCAINE HYDROCHLORIDE 100 MG: 20 INJECTION, SOLUTION EPIDURAL; INFILTRATION; INTRACAUDAL; PERINEURAL at 11:03

## 2025-03-26 RX ADMIN — Medication 100 MCG: at 11:03

## 2025-03-26 RX ADMIN — PROPOFOL 150 MCG/KG/MIN: 10 INJECTION, EMULSION INTRAVENOUS at 11:03

## 2025-03-26 NOTE — PROGRESS NOTES
Patient arrived to EP PACU in stable condition. Report received from procedural RN and anesthesia provider. Continuous cardiac monitoring in place. Safety measures verified. PACU BCGs maintained. RN at bedside. Will continue to monitor.

## 2025-03-26 NOTE — H&P
[facility]  Cardiology  RADHIKA/DCCV History & Physical      Jhonatan Sofia  YOB: 1965  Medical Record Number:  [id number]  Attending Physician:  Scotty Shell MD   Date of Admission: 3/26/2025       Hospital Day:  0  Current Principal Problem:  Atrial fibrillation      History     Cc: RADHIKA/DCCV for AF    HPI  Last OV with Dr. Shell on 6/20/23.   58 y.o. male with HTN, AF/AFL (PVI and CTI-line 1/12/2022).  Background:  He works at [employer] and gets yearly on-site physical exams with electrocardiograms. He reports they had been normal until this March of 2020 when he was diagnosed with atrial fibrillation. He had no obvious symptoms however his wife and daughter report some increased fatigue that they have noted. Low dose metoprolol and eliquis were prescribed and he was referred for further evaluation.      He underwent cardioversion 8/6 with symptom improvement. Continued to have AF afterward and was placed on flecainide. In SR when presented for subsequent DCCV which was aborted. He is here for follow up.   He continues to feel better in SR. Has had 2 breakthrough episodes when he delayed his medications.      11/29/2021: Pt out of rhythm despite flecainide. He had multiple AF breakthrough episodes.      1/12/2022:  Successful CTI ablation. Successful pulmonary vein RF ablation.     3/16/2022: Pt is 2 mo s/p PVI and CTI-line, no documented or symptomatic recurrence of arrhythmia since procedure. Reduced flecainide to 50mg BID.      2/10/2023 holter monitor: Paroxysmal atrial fibrillation and flutter. RVR most noted with atrial flutter. AF burden ~78%. He elected for redo-ablation.    4/28/2023: Mitral annular flutter diagnosed and underwent lateral mitral isthmus ablation with bidirectional block, repeat ablation of CTI for loss of CTI block, re-isolation of RSPV/RIPV and LA posterior wall isolation.    Last week, he had an EKG done at his job which showed he  was back in AF. He was asymptomatic. Eliquis was resumed.   Today, here for RADHIKA/DCCV. No acute complaints.    Rate/rhythm control: none  Anticoagulant/antiplatelets: eliquis  ECG: AF, 103 bpm  Platelet count: 229  INR: 1.0    History of stroke:  no  Dysphagia or odynophagia:  no  Liver Disease, esophageal disease, or known varices:  no  Upper GI Bleeding:  no  Snoring:  no   Sleep Apnea:  no  History of anesthetic difficulties:  no  Last oral intake: last pm   Able to move neck in all directions:  yes  GLP-1 Use: no      Medications - Outpatient  Prior to Admission medications    Medication Sig Start Date End Date Taking? Authorizing Provider   apixaban (ELIQUIS) 5 mg Tab Take 1 tablet (5 mg total) by mouth 2 (two) times daily. 3/18/25 6/16/25  Scotty Shell MD         Medications - Current  Scheduled Meds:  Continuous Infusions:  PRN Meds:.      Allergies  Review of patient's allergies indicates:  No Known Allergies      Past Medical History  Past Medical History:   Diagnosis Date    Anticoagulant long-term use     Atrial fibrillation     Hyperlipidemia     Hypertension     Kidney stones          Past Surgical History  Past Surgical History:   Procedure Laterality Date    ABLATION OF ARRHYTHMOGENIC FOCUS FOR ATRIAL FIBRILLATION N/A 1/12/2022    Procedure: Ablation atrial fibrillation;  Surgeon: Scotty Shell MD;  Location: [facility] EP LAB;  Service: Cardiology;  Laterality: N/A;  AF/AFL, RADHIKA (Cx if SR), PVI, CTI, RFA, Carto, Gen, DC, 3 Prep    ABLATION OF ARRHYTHMOGENIC FOCUS FOR ATRIAL FIBRILLATION N/A 4/28/2023    Procedure: Ablation atrial fibrillation;  Surgeon: Scotty Shell MD;  Location: [facility] EP LAB;  Service: Cardiology;  Laterality: N/A;  AF/AFL, RADHIKA (Cx if SR), PVI redo, RFA for Atyp AFL, Carto, Gen, DC, 3 Prep    ABLATION, ATRIAL FLUTTER, ATYPICAL N/A 4/28/2023    Procedure: Ablation, Atrial Flutter, Atypical;  Surgeon: Scotty Shell MD;  Location: [facility] EP LAB;  Service: Cardiology;  Laterality: N/A;     ABLATION, ATRIAL FLUTTER, TYPICAL  4/28/2023    Procedure: Ablation, Atrial Flutter, Typical;  Surgeon: Scotty Shell MD;  Location: [facility] EP LAB;  Service: Cardiology;;    CARDIOVERSION      COLONOSCOPY N/A 1/18/2024    Procedure: COLONOSCOPY;  Surgeon: Shukri Urbina MD;  Location: [facility] ENDO (4TH FLR);  Service: Endoscopy;  Laterality: N/A;  Ref by: Jose Sanchez MD  Prep: PEG  Instructions to portal  No longer taking Xarelto  DBM  10/16/23- change in Dr. Patel' scope schedule / Pt r/s at 4th flr  due to cardiac Hx/ see office visit dated 6/20/23, Pt no longer on Xarelto / prep ins on portal - PEG-ERW  1/11-    SHOULDER SURGERY      TREATMENT OF CARDIAC ARRHYTHMIA N/A 8/6/2020    Procedure: CARDIOVERSION;  Surgeon: Scotty Shell MD;  Location: [facility] EP LAB;  Service: Cardiology;  Laterality: N/A;  AF, RADHIKA, DCCV, MAC, SD, 3 Prep         Social History  Social History     Socioeconomic History    Marital status:     Number of children: 1   Occupational History     Employer: [employer]   Tobacco Use    Smoking status: Never    Smokeless tobacco: Never   Substance and Sexual Activity    Alcohol use: Yes     Comment: socially    Drug use: Never    Sexual activity: Not Currently     Partners: Female     Social Drivers of Health     Financial Resource Strain: Unknown (8/24/2022)    Overall Financial Resource Strain (CARDIA)     Difficulty of Paying Living Expenses: Patient declined   Food Insecurity: Unknown (8/24/2022)    Hunger Vital Sign     Worried About Running Out of Food in the Last Year: Patient declined     Ran Out of Food in the Last Year: Patient declined   Transportation Needs: Unknown (2/5/2023)    PRAPARE - Transportation     Lack of Transportation (Non-Medical): Patient declined   Physical Activity: Unknown (2/5/2023)    Exercise Vital Sign     Days of Exercise per Week: 5 days   Stress: No Stress Concern Present (2/5/2023)    [employer] - Occupational Stress  "Questionnaire     Feeling of Stress : Not at all   Housing Stability: Unknown (2/5/2023)    Housing Stability Vital Sign     Unstable Housing in the Last Year: Patient refused         ROS  Review of Systems   Constitutional: Negative for chills.   HENT: Negative.     Eyes: Negative.    Cardiovascular:  Negative for chest pain, dyspnea on exertion and palpitations.   Respiratory: Negative.  Negative for shortness of breath and sleep disturbances due to breathing.    Endocrine: Negative.    Musculoskeletal: Negative.    Gastrointestinal:  Negative for hematemesis, melena, nausea and vomiting.   Genitourinary: Negative.    Neurological: Negative.    Psychiatric/Behavioral: Negative.  Negative for altered mental status.    Allergic/Immunologic: Negative.    Physical Examination     Vital Signs  24 Hour VS Range    Temp:  [97.9 °F (36.6 °C)]   Pulse:  [104]   Resp:  [20]   BP: (140-146)/(82-86)   SpO2:  [99 %]       Physical Exam:   Physical Exam  Constitutional:       General: He is not in acute distress.     Appearance: Normal appearance.   HENT:      Head: Normocephalic.      Mouth/Throat:      Mouth: Mucous membranes are moist.   Cardiovascular:      Rate and Rhythm: Tachycardia present. Rhythm irregular.   Pulmonary:      Effort: Pulmonary effort is normal.   Abdominal:      Palpations: Abdomen is soft.   Musculoskeletal:      Right lower leg: No edema.      Left lower leg: No edema.   Skin:     General: Skin is warm.   Neurological:      Mental Status: He is alert and oriented to person, place, and time.           Data       Recent Labs   Lab 03/19/25  1014   WBC 5.11   HGB 15.2   HCT 45.5           Recent Labs   Lab 03/19/25  1014   INR 1.0        Recent Labs   Lab 03/19/25  1014      K 4.5      CO2 22*   BUN 20   CREATININE 1.2   ANIONGAP 12   CALCIUM 9.8        No results for input(s): "PROT", "ALBUMIN", "BILITOT", "ALKPHOS", "AST", "ALT" in the last 168 hours.     No results for input(s): " ""TROPONINI" in the last 168 hours.     No results found for: "BNP"    No results for input(s): "LABBLOO" in the last 168 hours.         Assessment & Plan     #Atrial fibrillation  - proceed with RADHIKA/DCCV      TTE 5/2021:  The left ventricle is normal in size with normal systolic function.  The estimated ejection fraction is 63%.  Normal left ventricular diastolic function.  Severe left atrial enlargement.  Normal right ventricular size with normal right ventricular systolic function.  Moderate right atrial enlargement.  Mild mitral regurgitation.  Mild tricuspid regurgitation.  Normal central venous pressure (3 mmHg).  The estimated PA systolic pressure is 28 mmHg.  Trivial pericardial effusion. Outside the RA.      -No absolute contraindications of esophageal stricture, tumor, perforation, laceration,or diverticulum and/or active GI bleed.  -The risks, benefits & alternatives of the procedure were explained to the patient.  -The risks of transesophageal echo include but are not limited to:  Dental trauma, esophageal trauma/perforation, bleeding, laryngospasm/brochospasm, aspiration, sore throat/hoarseness, & dislodgement of the endotracheal tube/nasogastric tube (where applicable).  -The risks of moderate sedation include hypotension, respiratory depression, arrhythmias, bronchospasm, & death.  -Prior to procedure, extensive discussion with patient regarding risks and benefits of DCCV at bedside today. The patient voices understanding, all questions have been answered, and patient would like to proceed.  -Informed consent was obtained. The patient is agreeable to proceed with the procedure and all questions and concerns addressed.    Case was discussed with an attending physician prior to procedure.    Silvia Roper PA-C  [facility]   "

## 2025-03-26 NOTE — ANESTHESIA POSTPROCEDURE EVALUATION
Anesthesia Post Evaluation    Patient: Jhonatan Sofia    Procedure(s) Performed: Procedure(s) (LRB):  Cardioversion or Defibrillation (N/A)  Transesophageal echo (RADHIKA) intra-procedure log documentation (N/A)    Final Anesthesia Type: general      Patient location during evaluation: PACU  Patient participation: Yes- Able to Participate  Level of consciousness: awake and alert and oriented  Post-procedure vital signs: reviewed and stable  Pain management: adequate  Airway patency: patent    PONV status at discharge: No PONV  Anesthetic complications: no      Cardiovascular status: blood pressure returned to baseline  Respiratory status: unassisted, room air and spontaneous ventilation  Hydration status: euvolemic  Follow-up not needed.              Vitals Value Taken Time   BP 90/62 03/26/25 12:05   Temp 36.5 °C (97.7 °F) 03/26/25 12:00   Pulse 61 03/26/25 12:16   Resp 24 03/26/25 12:00   SpO2 95 % 03/26/25 12:16   Vitals shown include unfiled device data.      No case tracking events are documented in the log.      Pain/Alex Score: Alex Score: 6 (3/26/2025 12:05 PM)

## 2025-03-26 NOTE — PLAN OF CARE
AVS printed. Home care instructions reviewed with patient  and wife.patient is aaox4.vss.PIV removed. No distress. No complaints. Wheelchair provided for  discharge.

## 2025-03-26 NOTE — PLAN OF CARE
Report received from Gail Ocampo RN. Patient is s/p  RADHIKA/DCCV. Patient is aaox4.  Bedside monitoring connected.EKG completed. VSS. No complaints at present. See flowsheet charting. Family notified. Tolerated sips of clears. RN monitoring at bedside.

## 2025-03-26 NOTE — DISCHARGE INSTRUCTIONS
Medications:  -No changes have been made today. Continue to take your home medications as listed on your medication list after you are discharged.    Diet  -You may resume oral intake after you are discharged, as long you have no swallowing difficulties.    Because you have received sedation for this procedure:  -Limit activity for the remainder of the day.  -Do not smoke for at least 6 hours and until you are fully awake and alert.  -Do not drink alcoholic beverage for 24 hours.  -Do not drive for 24 hours.  -Defer important decision making until the following day.     Go to the Emergency Department if you develop:   -Bleeding  -Weakness or numbness  -Visual, gait or speech disturbance  -New chest pain, palpitations, shortness of breath, rapid heart beat, or fainting  -Fever    Follow up:  -EKG in 1 week.  -Call or message the office to schedule an appointment with Dr. Shell or Shari Dc NP in 1 month.       Any need to reschedule or cancel procedures, or any questions regarding your procedures should be addressed directly with the Arrhythmia Department Nurses at the following phone number: [phone].

## 2025-03-26 NOTE — DISCHARGE SUMMARY
[facility]  Cardiology  Discharge Summary      Patient Name: Jhonatan Sofia  MRN: [id number]  Admission Date: 3/26/2025  Hospital Length of Stay: 0 days  Discharge Date and Time:  03/26/2025 1:41 PM  Attending Physician: Scotty Shell MD    Discharging Provider: Silvia Roper PA-C  Primary Care Physician: Jose Sanchez MD    HPI:   Last OV with Dr. Shell on 6/20/23.   58 y.o. male with HTN, AF/AFL (PVI and CTI-line 1/12/2022).  Background:  He works at [employer] and gets yearly on-site physical exams with electrocardiograms. He reports they had been normal until this March of 2020 when he was diagnosed with atrial fibrillation. He had no obvious symptoms however his wife and daughter report some increased fatigue that they have noted. Low dose metoprolol and eliquis were prescribed and he was referred for further evaluation.      He underwent cardioversion 8/6 with symptom improvement. Continued to have AF afterward and was placed on flecainide. In SR when presented for subsequent DCCV which was aborted. He is here for follow up.   He continues to feel better in SR. Has had 2 breakthrough episodes when he delayed his medications.      11/29/2021: Pt out of rhythm despite flecainide. He had multiple AF breakthrough episodes.      1/12/2022:  Successful CTI ablation. Successful pulmonary vein RF ablation.     3/16/2022: Pt is 2 mo s/p PVI and CTI-line, no documented or symptomatic recurrence of arrhythmia since procedure. Reduced flecainide to 50mg BID.      2/10/2023 holter monitor: Paroxysmal atrial fibrillation and flutter. RVR most noted with atrial flutter. AF burden ~78%. He elected for redo-ablation.     4/28/2023: Mitral annular flutter diagnosed and underwent lateral mitral isthmus ablation with bidirectional block, repeat ablation of CTI for loss of CTI block, re-isolation of RSPV/RIPV and LA posterior wall isolation.     Last week, he had an EKG done at his job which showed  he was back in AF. He was asymptomatic. Eliquis was resumed.   Today, here for RADHIKA/DCCV. No acute complaints.      Procedure(s) (LRB):  Cardioversion or Defibrillation (N/A)  Transesophageal echo (RADHIKA) intra-procedure log documentation (N/A)       Indwelling Lines/Drains at time of discharge:  None       Hospital Course:  Patient underwent RADHIKA without evidence of SURINDER thrombus. Proceeded with DCCV, converting from AF to sinus rhythm. Patient tolerated the procedure without any acute complications. Post-DCCV ECG revealed NSR at 64 bpm. Plan to continue eliquis. Multaq was priced with $800 copay, so will not proceed. Dr. Shell discussed possible re-do ablation with patient.  Patient was assessed at bedside prior to discharge, they reported feeling well and denied chest discomfort, shortness of breath, palpitations, lightheadedness, or any other acute symptoms. Discharge instructions were discussed with patient and all questions were answered. Patient was discharged home in stable condition.        Goals of Care Treatment Preferences:  Code Status: Full Code      Significant Diagnostic Studies: RADHIKA - final report pending - prelim no SURINDER thrombus, proceeded with DCCV      Pending Diagnostic Studies:       Procedure Component Value Units Date/Time    EKG 12-lead [[id number]]     Order Status: Sent Lab Status: No result             There are no hospital problems to display for this patient.    No new Assessment & Plan notes have been filed under this hospital service since the last note was generated.  Service: Cardiology      Discharged Condition: stable    Disposition: Home or Self Care    Follow Up:   Follow-up Information       Scotty Shell MD. Schedule an appointment as soon as possible for a visit in 1 month(s).    Specialties: Electrophysiology, Cardiology  Why: follow up  Contact information:  [address]  [facility] [address]  [phone]                           Patient Instructions:   No discharge  procedures on file.  Medications:  Reconciled Home Medications:      Medication List        CONTINUE taking these medications      apixaban 5 mg Tab  Commonly known as: ELIQUIS  Take 1 tablet (5 mg total) by mouth 2 (two) times daily.              Time spent on the discharge of patient: 35 minutes    Silvia Roper PA-C  Cardiology  [facility]

## 2025-03-26 NOTE — TRANSFER OF CARE
Anesthesia Transfer of Care Note    Patient: Jhonatna Sofia    Procedure(s) Performed: Procedure(s) (LRB):  Cardioversion or Defibrillation (N/A)  Transesophageal echo (RADHIKA) intra-procedure log documentation (N/A)    Patient location: PACU    Anesthesia Type: general    Transport from OR: Transported from OR on 6-10 L/min O2 by face mask with adequate spontaneous ventilation    Post pain: adequate analgesia    Post assessment: no apparent anesthetic complications and tolerated procedure well    Post vital signs: stable    Level of consciousness: awake, alert and oriented    Nausea/Vomiting: no nausea/vomiting    Complications: none    Transfer of care protocol was followed    Last vitals: Visit Vitals  BP (!) 146/82 (BP Location: Left arm, Patient Position: Lying)   Pulse 104   Temp 36.6 °C (97.9 °F) (Temporal)   Resp 20   Ht 6' (1.829 m)   Wt 102.1 kg (225 lb)   SpO2 99%   BMI 30.52 kg/m²

## 2025-03-26 NOTE — ANESTHESIA PREPROCEDURE EVALUATION
03/26/2025  Jhonatan Sofia is a 60 y.o., male.      Pre-op Assessment    I have reviewed the Patient Summary Reports.     I have reviewed the Nursing Notes. I have reviewed the NPO Status.   I have reviewed the Medications.     Review of Systems  Anesthesia Hx:  No problems with previous Anesthesia   History of prior surgery of interest to airway management or planning:          Denies Family Hx of Anesthesia complications.    Denies Personal Hx of Anesthesia complications.                    Hematology/Oncology:  Hematology Normal   Oncology Normal                                   EENT/Dental:  EENT/Dental Normal           Cardiovascular:  Exercise tolerance: good   Hypertension    Dysrhythmias atrial fibrillation      hyperlipidemia                               Pulmonary:  Pulmonary Normal                       Renal/:  Renal/ Normal                 Hepatic/GI:  Hepatic/GI Normal                    Musculoskeletal:  Musculoskeletal Normal                Neurological:  Neurology Normal                                      Endocrine:  Endocrine Normal            Dermatological:  Skin Normal    Psych:  Psychiatric Normal                    Physical Exam  General: Well nourished, Cooperative, Alert and Oriented    Airway:  Mallampati: II   Mouth Opening: Normal  TM Distance: Normal  Tongue: Normal  Neck ROM: Normal ROM    Dental:  Intact        Anesthesia Plan  Type of Anesthesia, risks & benefits discussed:    Anesthesia Type: Gen Natural Airway  Intra-op Monitoring Plan: Standard ASA Monitors  Induction:  IV  Informed Consent: Informed consent signed with the Patient and all parties understand the risks and agree with anesthesia plan.  All questions answered.   ASA Score: 3  Day of Surgery Review of History & Physical: H&P Update referred to the surgeon/provider.    Ready For Surgery From Anesthesia  Perspective.     .

## 2025-03-26 NOTE — HOSPITAL COURSE
Patient underwent RADHIKA without evidence of SURINDER thrombus. Proceeded with DCCV, converting from AF to sinus rhythm. Patient tolerated the procedure without any acute complications. Post-DCCV ECG revealed NSR at 64 bpm. Plan to continue eliquis. Multaq was priced with $800 copay, so will not proceed. Dr. Shell discussed possible re-do ablation with patient.  Patient was assessed at bedside prior to discharge, they reported feeling well and denied chest discomfort, shortness of breath, palpitations, lightheadedness, or any other acute symptoms. Discharge instructions were discussed with patient and all questions were answered. Patient was discharged home in stable condition.

## 2025-03-27 ENCOUNTER — PATIENT MESSAGE (OUTPATIENT)
Dept: ELECTROPHYSIOLOGY | Facility: CLINIC | Age: 60
End: 2025-03-27
Payer: COMMERCIAL

## 2025-03-29 ENCOUNTER — HOSPITAL ENCOUNTER (INPATIENT)
Facility: HOSPITAL | Age: 60
LOS: 1 days | Discharge: HOME | DRG: 310 | End: 2025-03-31
Attending: EMERGENCY MEDICINE | Admitting: INTERNAL MEDICINE
Payer: COMMERCIAL

## 2025-03-29 ENCOUNTER — NURSE TRIAGE (OUTPATIENT)
Dept: ADMINISTRATIVE | Facility: CLINIC | Age: 60
End: 2025-03-29
Payer: COMMERCIAL

## 2025-03-29 DIAGNOSIS — I49.9 ARRHYTHMIA: ICD-10-CM

## 2025-03-29 DIAGNOSIS — I48.91 ATRIAL FIBRILLATION WITH RVR: ICD-10-CM

## 2025-03-29 DIAGNOSIS — I48.0 PAROXYSMAL ATRIAL FIBRILLATION: ICD-10-CM

## 2025-03-29 DIAGNOSIS — I48.91 A-FIB: ICD-10-CM

## 2025-03-29 DIAGNOSIS — I48.92 ATRIAL FLUTTER: ICD-10-CM

## 2025-03-29 DIAGNOSIS — I50.9 CONGESTIVE HEART FAILURE, UNSPECIFIED HF CHRONICITY, UNSPECIFIED HEART FAILURE TYPE: Primary | ICD-10-CM

## 2025-03-29 LAB
ABSOLUTE EOSINOPHIL (OHS): 0.06 K/UL
ABSOLUTE MONOCYTE (OHS): 0.46 K/UL (ref 0.3–1)
ABSOLUTE NEUTROPHIL COUNT (OHS): 2.19 K/UL (ref 1.8–7.7)
ALBUMIN SERPL BCP-MCNC: 4 G/DL (ref 3.5–5.2)
ALP SERPL-CCNC: 53 UNIT/L (ref 40–150)
ALT SERPL W/O P-5'-P-CCNC: 24 UNIT/L (ref 10–44)
ANION GAP (OHS): 10 MMOL/L (ref 8–16)
AST SERPL-CCNC: 22 UNIT/L (ref 11–45)
BASOPHILS # BLD AUTO: 0.03 K/UL
BASOPHILS NFR BLD AUTO: 0.7 %
BILIRUB SERPL-MCNC: 0.9 MG/DL (ref 0.1–1)
BNP SERPL-MCNC: 151 PG/ML (ref 0–99)
BUN SERPL-MCNC: 19 MG/DL (ref 6–20)
CALCIUM SERPL-MCNC: 9.4 MG/DL (ref 8.7–10.5)
CHLORIDE SERPL-SCNC: 108 MMOL/L (ref 95–110)
CO2 SERPL-SCNC: 24 MMOL/L (ref 23–29)
CREAT SERPL-MCNC: 1 MG/DL (ref 0.5–1.4)
ERYTHROCYTE [DISTWIDTH] IN BLOOD BY AUTOMATED COUNT: 12.7 % (ref 11.5–14.5)
GFR SERPLBLD CREATININE-BSD FMLA CKD-EPI: >60 ML/MIN/1.73/M2
GLUCOSE SERPL-MCNC: 93 MG/DL (ref 70–110)
HCT VFR BLD AUTO: 45.2 % (ref 40–54)
HCV AB SERPL QL IA: NORMAL
HGB BLD-MCNC: 15.3 GM/DL (ref 14–18)
HIV 1+2 AB+HIV1 P24 AG SERPL QL IA: NORMAL
IMM GRANULOCYTES # BLD AUTO: 0 K/UL (ref 0–0.04)
IMM GRANULOCYTES NFR BLD AUTO: 0 % (ref 0–0.5)
LYMPHOCYTES # BLD AUTO: 1.45 K/UL (ref 1–4.8)
MAGNESIUM SERPL-MCNC: 1.9 MG/DL (ref 1.6–2.6)
MCH RBC QN AUTO: 31.9 PG (ref 27–50)
MCHC RBC AUTO-ENTMCNC: 33.8 G/DL (ref 32–36)
MCV RBC AUTO: 94 FL (ref 82–98)
NUCLEATED RBC (/100WBC) (OHS): 0 /100 WBC
PHOSPHATE SERPL-MCNC: 2.5 MG/DL (ref 2.7–4.5)
PLATELET # BLD AUTO: 233 K/UL (ref 150–450)
PMV BLD AUTO: 10.9 FL (ref 9.2–12.9)
POTASSIUM SERPL-SCNC: 3.9 MMOL/L (ref 3.5–5.1)
PROT SERPL-MCNC: 7.2 GM/DL (ref 6–8.4)
RBC # BLD AUTO: 4.8 M/UL (ref 4.6–6.2)
RELATIVE EOSINOPHIL (OHS): 1.4 %
RELATIVE LYMPHOCYTE (OHS): 34.6 % (ref 18–48)
RELATIVE MONOCYTE (OHS): 11 % (ref 4–15)
RELATIVE NEUTROPHIL (OHS): 52.3 % (ref 38–73)
SODIUM SERPL-SCNC: 142 MMOL/L (ref 136–145)
TSH SERPL-ACNC: 1.98 UIU/ML (ref 0.4–4)
WBC # BLD AUTO: 4.19 K/UL (ref 3.9–12.7)

## 2025-03-29 PROCEDURE — 25000003 PHARM REV CODE 250

## 2025-03-29 PROCEDURE — G0378 HOSPITAL OBSERVATION PER HR: HCPCS

## 2025-03-29 PROCEDURE — 83735 ASSAY OF MAGNESIUM: CPT

## 2025-03-29 PROCEDURE — 86803 HEPATITIS C AB TEST: CPT | Performed by: PHYSICIAN ASSISTANT

## 2025-03-29 PROCEDURE — 99231 SBSQ HOSP IP/OBS SF/LOW 25: CPT | Mod: ,,, | Performed by: INTERNAL MEDICINE

## 2025-03-29 PROCEDURE — 83880 ASSAY OF NATRIURETIC PEPTIDE: CPT

## 2025-03-29 PROCEDURE — 99285 EMERGENCY DEPT VISIT HI MDM: CPT | Mod: 25

## 2025-03-29 PROCEDURE — 84100 ASSAY OF PHOSPHORUS: CPT

## 2025-03-29 PROCEDURE — 93010 ELECTROCARDIOGRAM REPORT: CPT | Mod: ,,, | Performed by: INTERNAL MEDICINE

## 2025-03-29 PROCEDURE — 80053 COMPREHEN METABOLIC PANEL: CPT | Performed by: EMERGENCY MEDICINE

## 2025-03-29 PROCEDURE — 85025 COMPLETE CBC W/AUTO DIFF WBC: CPT | Performed by: EMERGENCY MEDICINE

## 2025-03-29 PROCEDURE — 84443 ASSAY THYROID STIM HORMONE: CPT

## 2025-03-29 PROCEDURE — 93005 ELECTROCARDIOGRAM TRACING: CPT

## 2025-03-29 PROCEDURE — 25000003 PHARM REV CODE 250: Performed by: INTERNAL MEDICINE

## 2025-03-29 PROCEDURE — 87389 HIV-1 AG W/HIV-1&-2 AB AG IA: CPT | Performed by: PHYSICIAN ASSISTANT

## 2025-03-29 RX ORDER — SODIUM CHLORIDE 0.9 % (FLUSH) 0.9 %
10 SYRINGE (ML) INJECTION EVERY 12 HOURS PRN
Status: DISCONTINUED | OUTPATIENT
Start: 2025-03-29 | End: 2025-03-31 | Stop reason: HOSPADM

## 2025-03-29 RX ORDER — ONDANSETRON 4 MG/1
4 TABLET, FILM COATED ORAL EVERY 6 HOURS PRN
Status: DISCONTINUED | OUTPATIENT
Start: 2025-03-29 | End: 2025-03-31 | Stop reason: HOSPADM

## 2025-03-29 RX ORDER — IBUPROFEN 200 MG
24 TABLET ORAL
Status: DISCONTINUED | OUTPATIENT
Start: 2025-03-29 | End: 2025-03-31 | Stop reason: HOSPADM

## 2025-03-29 RX ORDER — SODIUM,POTASSIUM PHOSPHATES 280-250MG
2 POWDER IN PACKET (EA) ORAL ONCE
Status: COMPLETED | OUTPATIENT
Start: 2025-03-29 | End: 2025-03-29

## 2025-03-29 RX ORDER — METOPROLOL TARTRATE 25 MG/1
25 TABLET, FILM COATED ORAL
Status: COMPLETED | OUTPATIENT
Start: 2025-03-29 | End: 2025-03-29

## 2025-03-29 RX ORDER — NALOXONE HCL 0.4 MG/ML
0.02 VIAL (ML) INJECTION
Status: DISCONTINUED | OUTPATIENT
Start: 2025-03-29 | End: 2025-03-31 | Stop reason: HOSPADM

## 2025-03-29 RX ORDER — GLUCAGON 1 MG
1 KIT INJECTION
Status: DISCONTINUED | OUTPATIENT
Start: 2025-03-29 | End: 2025-03-31 | Stop reason: HOSPADM

## 2025-03-29 RX ORDER — METOPROLOL TARTRATE 1 MG/ML
5 INJECTION, SOLUTION INTRAVENOUS EVERY 6 HOURS PRN
Status: DISCONTINUED | OUTPATIENT
Start: 2025-03-29 | End: 2025-03-31 | Stop reason: HOSPADM

## 2025-03-29 RX ORDER — METOPROLOL TARTRATE 25 MG/1
25 TABLET, FILM COATED ORAL 2 TIMES DAILY
Status: DISCONTINUED | OUTPATIENT
Start: 2025-03-29 | End: 2025-03-31 | Stop reason: HOSPADM

## 2025-03-29 RX ORDER — ACETAMINOPHEN 325 MG/1
650 TABLET ORAL EVERY 4 HOURS PRN
Status: DISCONTINUED | OUTPATIENT
Start: 2025-03-29 | End: 2025-03-31 | Stop reason: HOSPADM

## 2025-03-29 RX ORDER — IBUPROFEN 200 MG
16 TABLET ORAL
Status: DISCONTINUED | OUTPATIENT
Start: 2025-03-29 | End: 2025-03-31 | Stop reason: HOSPADM

## 2025-03-29 RX ADMIN — METOPROLOL TARTRATE 25 MG: 25 TABLET, FILM COATED ORAL at 01:03

## 2025-03-29 RX ADMIN — APIXABAN 5 MG: 5 TABLET, FILM COATED ORAL at 08:03

## 2025-03-29 RX ADMIN — METOPROLOL TARTRATE 25 MG: 25 TABLET, FILM COATED ORAL at 08:03

## 2025-03-29 RX ADMIN — POTASSIUM & SODIUM PHOSPHATES POWDER PACK 280-160-250 MG 2 PACKET: 280-160-250 PACK at 03:03

## 2025-03-29 NOTE — ED PROVIDER NOTES
Encounter Date: 3/29/2025       History     Chief Complaint   Patient presents with    abnormal heart rate     Cardioversion on Wednesday for afib. Cardio-mobile showing elevated hr. Denies CP or other symptoms.      HPI  Jhonatan Scotty Sofia is a 60 y.o. male with a hx of HTN and atrial fibrillation/atrial flutter (on apixaban) presenting to the ED for atrial fibrillation. Patient says that it began days ago while at work and was alerted by his cardio-mobile porfirio. Patient had a RADHIKA/DCCV on Wednesday, 3/27 and was converted to NSR. Was at work last night when he checked his device and discovered he was back in atrial fibrillation and decided to come in after realizing his HR was greater than 120. Patient denies fever, chills, shortness of breath, chest pain, chest pressure, palpitations, nausea, and vomiting. Laying comfortably in bed with wife at bedside. RADHIKA from Wednesday showed an EF of 25-30% which is new and no hx of CHF.      Review of patient's allergies indicates:  No Known Allergies  Past Medical History:   Diagnosis Date    Anticoagulant long-term use     Atrial fibrillation     Hyperlipidemia     Hypertension     Kidney stones      Past Surgical History:   Procedure Laterality Date    ABLATION OF ARRHYTHMOGENIC FOCUS FOR ATRIAL FIBRILLATION N/A 1/12/2022    Procedure: Ablation atrial fibrillation;  Surgeon: Scotty Shell MD;  Location: [facility] EP LAB;  Service: Cardiology;  Laterality: N/A;  AF/AFL, RADHIKA (Cx if SR), PVI, CTI, RFA, Carto, Gen, ND, 3 Prep    ABLATION OF ARRHYTHMOGENIC FOCUS FOR ATRIAL FIBRILLATION N/A 4/28/2023    Procedure: Ablation atrial fibrillation;  Surgeon: Scotty Shell MD;  Location: [facility] EP LAB;  Service: Cardiology;  Laterality: N/A;  AF/AFL, RADHIKA (Cx if SR), PVI redo, RFA for Atyp AFL, Carto, Gen, ND, 3 Prep    ABLATION, ATRIAL FLUTTER, ATYPICAL N/A 4/28/2023    Procedure: Ablation, Atrial Flutter, Atypical;  Surgeon: Scotty Shell MD;  Location: [facility] EP LAB;  Service: Cardiology;   Laterality: N/A;    ABLATION, ATRIAL FLUTTER, TYPICAL  4/28/2023    Procedure: Ablation, Atrial Flutter, Typical;  Surgeon: Scotty Shell MD;  Location: [facility] EP LAB;  Service: Cardiology;;    CARDIOVERSION      COLONOSCOPY N/A 1/18/2024    Procedure: COLONOSCOPY;  Surgeon: Shukri Urbina MD;  Location: [facility] (4TH FLR);  Service: Endoscopy;  Laterality: N/A;  Ref by: Jose Sanchez MD  Prep: PEG  Instructions to portal  No longer taking Xarelto  DBM  10/16/23- change in Dr. Patel' scope schedule / Pt r/s at 4th flr  due to cardiac Hx/ see office visit dated 6/20/23, Pt no longer on Xarelto / prep ins on portal - PEG-ERW  1/11-    ECHOCARDIOGRAM,TRANSESOPHAGEAL N/A 3/26/2025    Procedure: Transesophageal echo (RADHIKA) intra-procedure log documentation;  Surgeon: Kristian Hawkins MD;  Location: [facility] EP LAB;  Service: Cardiology;  Laterality: N/A;    SHOULDER SURGERY      TREATMENT OF CARDIAC ARRHYTHMIA N/A 8/6/2020    Procedure: CARDIOVERSION;  Surgeon: Scotty Shell MD;  Location: [facility] EP LAB;  Service: Cardiology;  Laterality: N/A;  AF, RADHIKA, DCCV, MAC, AK, 3 Prep    TREATMENT OF CARDIAC ARRHYTHMIA N/A 3/26/2025    Procedure: Cardioversion or Defibrillation;  Surgeon: Scotty Shell MD;  Location: [facility] EP LAB;  Service: Cardiology;  Laterality: N/A;  AF, RADHIKA, DCCV, MAC, AK, 3 Prep     Family History   Problem Relation Name Age of Onset    Heart disease Father      Kidney failure Father      Liver disease Sister          fatty liver    Arthritis Sister      Arthritis Sister       Social History[1]  Review of Systems   Constitutional: Negative.  Negative for chills, diaphoresis, fatigue and fever.   HENT: Negative.  Negative for congestion, ear pain, hearing loss, rhinorrhea and sore throat.    Eyes: Negative.    Respiratory: Negative.  Negative for cough, chest tightness and shortness of breath.    Cardiovascular: Negative.  Negative for chest pain and palpitations.   Gastrointestinal: Negative.  Negative for  abdominal distention, abdominal pain, blood in stool, constipation, diarrhea, nausea and vomiting.   Genitourinary: Negative.  Negative for dysuria, frequency and hematuria.   Musculoskeletal: Negative.  Negative for back pain, joint swelling and neck pain.   Skin: Negative.    Neurological: Negative.  Negative for dizziness, seizures, syncope, weakness, light-headedness and headaches.   Psychiatric/Behavioral: Negative.         Physical Exam     Initial Vitals [03/29/25 1237]   BP Pulse Resp Temp SpO2   124/79 65 18 97.9 °F (36.6 °C) 97 %      MAP       --         Physical Exam    Constitutional: He appears well-developed.   HENT:   Head: Normocephalic.   Cardiovascular:  Normal pulses.           Irregularly irregular   Pulmonary/Chest: Breath sounds normal. No respiratory distress.   Abdominal: Abdomen is soft. Bowel sounds are normal. He exhibits no distension. There is no abdominal tenderness.   Musculoskeletal:         General: No tenderness or edema. Normal range of motion.     Neurological: He is alert and oriented to person, place, and time.   Skin: Skin is warm.   Psychiatric: He has a normal mood and affect. His speech is normal.         ED Course   Procedures  Labs Reviewed   PHOSPHORUS - Abnormal       Result Value    Phosphorus Level 2.5 (*)    B-TYPE NATRIURETIC PEPTIDE - Abnormal     (*)    HEPATITIS C ANTIBODY - Normal    Hep C Ab Interp Non-Reactive     HIV 1 / 2 ANTIBODY - Normal    HIV 1/2 Ag/Ab Non-Reactive     COMPREHENSIVE METABOLIC PANEL - Normal    Sodium 142      Potassium 3.9      Chloride 108      CO2 24      Glucose 93      BUN 19      Creatinine 1.0      Calcium 9.4      Protein Total 7.2      Albumin 4.0      Bilirubin Total 0.9      ALP 53      AST 22      ALT 24      Anion Gap 10      eGFR >60     CBC WITH DIFFERENTIAL - Normal    WBC 4.19      RBC 4.80      HGB 15.3      HCT 45.2      MCV 94      MCH 31.9      MCHC 33.8      RDW 12.7      Platelet Count 233      MPV 10.9       Nucleated RBC 0      Neut % 52.3      Lymph % 34.6      Mono % 11.0      Eos % 1.4      Basophil % 0.7      Imm Grans % 0.0      Neut # 2.19      Lymph # 1.45      Mono # 0.46      Eos # 0.06      Baso # 0.03      Imm Grans # 0.00     MAGNESIUM - Normal    Magnesium  1.9     TSH - Normal    TSH 1.976     CBC W/ AUTO DIFFERENTIAL    Narrative:     The following orders were created for panel order CBC auto differential.  Procedure                               Abnormality         Status                     ---------                               -----------         ------                     CBC with Differential[[id number]]       Normal              Final result                 Please view results for these tests on the individual orders.          Imaging Results    None          Medications   metoprolol injection 5 mg (has no administration in time range)   apixaban tablet 5 mg (5 mg Oral Given 3/30/25 0822)   sodium chloride 0.9% flush 10 mL (has no administration in time range)   naloxone 0.4 mg/mL injection 0.02 mg (has no administration in time range)   glucose chewable tablet 16 g (has no administration in time range)   glucose chewable tablet 24 g (has no administration in time range)   dextrose 50% injection 12.5 g (has no administration in time range)   dextrose 50% injection 25 g (has no administration in time range)   glucagon (human recombinant) injection 1 mg (has no administration in time range)   acetaminophen tablet 650 mg (has no administration in time range)   ondansetron tablet 4 mg (has no administration in time range)   metoprolol tartrate (LOPRESSOR) tablet 25 mg (25 mg Oral Given 3/30/25 0822)   amiodarone in dextrose 150 mg/100 mL (1.5 mg/mL) loading dose 150 mg (has no administration in time range)   amiodarone 360 mg/200 mL (1.8 mg/mL) infusion (has no administration in time range)   amiodarone 360 mg/200 mL (1.8 mg/mL) infusion (has no administration in time range)   metoprolol tartrate  (LOPRESSOR) tablet 25 mg (25 mg Oral Given 3/29/25 4954)   potassium, sodium phosphates 280-160-250 mg packet 2 packet (2 packets Oral Given 3/29/25 6696)     Medical Decision Making  Jhonatan Sofia is a 60 y.o. male with a hx of HTN and atrial fibrillation/atrial flutter (on apixaban) presenting to the ED for atrial fibrillation. Ddx includes, but not limited to: paroxsymal atrial fibrillation, CHF, dehydration, and hyperthyroidism. Patient has been in and out of atrial fibrillation for the last 4 years with no clear causes. New EF of 25-30% may be factor in arrhythmia. Patient appears to take care of self well with adequate hydration so dehydration is less likely. TSH of 1.9 is WNL ruling out hyperthyroidism. Being admitted to hospital medicine per the request of Dr. Shell, his EP physician, and to be placed on amiodarone. Patient is stable with last HR of 104 and still in atrial fibrillation.      Amount and/or Complexity of Data Reviewed  Independent Historian: spouse     Details: Information obtained from patient and wife  External Data Reviewed: labs, radiology, ECG and notes.  Labs: ordered.  Radiology: ordered.  ECG/medicine tests: ordered. Decision-making details documented in ED Course.    Risk  OTC drugs.  Prescription drug management.              Attending Attestation:   Physician Attestation Statement for Resident:  As the supervising MD   Physician Attestation Statement: I have personally seen and examined this patient.   I agree with the above history.  -:   As the supervising MD I agree with the above PE.     As the supervising MD I agree with the above treatment, course, plan, and disposition.   -: 60 M hx of AFIB s/p cardioversion here for elevated HR.   No symptoms  BP stable  Labs unremarkable.   EKG reviewed and independently interpreted by me as atrial fibrillation at a rate of 125 with rapid ventricular rate, single PVC, no STEMI or ischemic change  Discussed with Dr. Shell,  recommending amnio infusion and admission to Hospital Medicine   I have reviewed and agree with the residents interpretation of the following: EKG.                                        Clinical Impression:  Final diagnoses:  [I48.91] A-fib  [I50.9] Congestive heart failure, unspecified HF chronicity, unspecified heart failure type (Primary)  [I48.91] Atrial fibrillation with RVR          ED Disposition Condition    Observation Stable                  Cathy Cleary MD  Resident  03/29/25 4638         [1]   Social History  Tobacco Use    Smoking status: Never     Passive exposure: Never    Smokeless tobacco: Never   Substance Use Topics    Alcohol use: Yes     Comment: socially    Drug use: Never        Yaz Almonte MD  03/30/25 1128

## 2025-03-29 NOTE — HPI
60-year-old gentleman past medical history of atrial arrhythmia, recently had RADHIKA with cardioversion done 3 days ago and was discharged on apixaban, remote ablations.  Came to hospital as his cardiac mobile device detected AFib and was advised by his cardiologist to come to hospital.  He was asymptomatic.  Recent echo done showed decreased EF, possible transient and secondary to the arrhythmia.  In the emergency room patient had transient tachycardia.  EKGs showed AFib with RVR.  Received oral metoprolol and oral phosphorus for correction.  Admit to Medicine team C

## 2025-03-29 NOTE — SUBJECTIVE & OBJECTIVE
Past Medical History:   Diagnosis Date    Anticoagulant long-term use     Atrial fibrillation     Hyperlipidemia     Hypertension     Kidney stones        Past Surgical History:   Procedure Laterality Date    ABLATION OF ARRHYTHMOGENIC FOCUS FOR ATRIAL FIBRILLATION N/A 1/12/2022    Procedure: Ablation atrial fibrillation;  Surgeon: Scotty Shell MD;  Location: [facility] EP LAB;  Service: Cardiology;  Laterality: N/A;  AF/AFL, RADHIKA (Cx if SR), PVI, CTI, RFA, Carto, Gen, [place], 3 Prep    ABLATION OF ARRHYTHMOGENIC FOCUS FOR ATRIAL FIBRILLATION N/A 4/28/2023    Procedure: Ablation atrial fibrillation;  Surgeon: Scotty Shell MD;  Location: [facility] EP LAB;  Service: Cardiology;  Laterality: N/A;  AF/AFL, RADHIKA (Cx if SR), PVI redo, RFA for Atyp AFL, Carto, Gen, [place], 3 Prep    ABLATION, ATRIAL FLUTTER, ATYPICAL N/A 4/28/2023    Procedure: Ablation, Atrial Flutter, Atypical;  Surgeon: Scotty Shell MD;  Location: [facility] EP LAB;  Service: Cardiology;  Laterality: N/A;    ABLATION, ATRIAL FLUTTER, TYPICAL  4/28/2023    Procedure: Ablation, Atrial Flutter, Typical;  Surgeon: Scotty Shell MD;  Location: [facility] EP LAB;  Service: Cardiology;;    CARDIOVERSION      COLONOSCOPY N/A 1/18/2024    Procedure: COLONOSCOPY;  Surgeon: Shukri Urbina MD;  Location: [facility] ([address] [place] [address]);  Service: Endoscopy;  Laterality: N/A;  Ref by: Jose Sanchez MD  Prep: PEG  Instructions to portal  No longer taking Xarelto  DB  10/16/23- change in Dr. Patel' scope schedule / Pt r/s at [address] [place] [address] due to cardiac Hx/ see office visit dated 6/20/23, Pt no longer on Xarelto / prep ins on portal - PEG-ERW  1/11-    ECHOCARDIOGRAM,TRANSESOPHAGEAL N/A 3/26/2025    Procedure: Transesophageal echo (RADHIKA) intra-procedure log documentation;  Surgeon: Kristian Hawkins MD;  Location: [facility] EP LAB;  Service: Cardiology;  Laterality: N/A;    SHOULDER SURGERY      TREATMENT OF CARDIAC ARRHYTHMIA N/A 8/6/2020    Procedure: CARDIOVERSION;  Surgeon: Scotty Shell MD;  Location: [facility]  EP LAB;  Service: Cardiology;  Laterality: N/A;  AF, RADHIKA, DCCV, MAC, [place], 3 Prep    TREATMENT OF CARDIAC ARRHYTHMIA N/A 3/26/2025    Procedure: Cardioversion or Defibrillation;  Surgeon: Scotty Shell MD;  Location: [facility] EP LAB;  Service: Cardiology;  Laterality: N/A;  AF, RADHIKA, DCCV, MAC, [place], 3 Prep       Review of patient's allergies indicates:  No Known Allergies    Current Facility-Administered Medications on File Prior to Encounter   Medication    sodium chloride 0.9% bolus 1,000 mL    sodium chloride 0.9% bolus 1,000 mL     Current Outpatient Medications on File Prior to Encounter   Medication Sig    apixaban (ELIQUIS) 5 mg Tab Take 1 tablet (5 mg total) by mouth 2 (two) times daily.     Family History       Problem Relation (Age of Onset)    Arthritis Sister, Sister    Heart disease Father    Kidney failure Father    Liver disease Sister          Tobacco Use    Smoking status: Never     Passive exposure: Never    Smokeless tobacco: Never   Substance and Sexual Activity    Alcohol use: Yes     Comment: socially    Drug use: Never    Sexual activity: Not Currently     Partners: Female     Review of Systems   All other systems reviewed and are negative.    Objective:     Vital Signs (Most Recent):  Temp: 97.9 °F (36.6 °C) (03/29/25 1237)  Pulse: 88 (03/29/25 1538)  Resp: 16 (03/29/25 1538)  BP: (!) 128/92 (03/29/25 1538)  SpO2: 98 % (03/29/25 1538) Vital Signs (24h Range):  Temp:  [97.9 °F (36.6 °C)] 97.9 °F (36.6 °C)  Pulse:  [] 88  Resp:  [15-18] 16  SpO2:  [96 %-98 %] 98 %  BP: (124-142)/(79-96) 128/92     Weight: 102.1 kg (225 lb)  Body mass index is 30.52 kg/m².     Physical Exam  Constitutional:       General: He is not in acute distress.  HENT:      Head: Normocephalic.      Right Ear: External ear normal.      Left Ear: External ear normal.      Nose: Nose normal.   Eyes:      General: No scleral icterus.  Neck:      Comments: NO JVD  Cardiovascular:      Rate and Rhythm: Normal rate.      Heart  sounds: Normal heart sounds.   Pulmonary:      Breath sounds: Normal breath sounds.   Abdominal:      Palpations: Abdomen is soft.      Tenderness: There is no abdominal tenderness.   Musculoskeletal:      Right lower leg: No edema.      Left lower leg: No edema.   Skin:     General: Skin is warm.   Neurological:      General: No focal deficit present.      Mental Status: He is alert and oriented to person, place, and time.   Psychiatric:         Mood and Affect: Mood normal.         Thought Content: Thought content normal.                Significant Labs: All pertinent labs within the past 24 hours have been reviewed.    Significant Imaging: I have reviewed all pertinent imaging results/findings within the past 24 hours.

## 2025-03-29 NOTE — TELEPHONE ENCOUNTER
Cardioversion done Wednesday. Has a cardia mobile and BP cuff to monitor.   Showed afib last PM, -120s. BP cuff shows 55-65, same with watch.   When pulse checked manually states 55-58, but that it does feel irregular.  Placed back on eliquis 5 mg BID approx 10 days ago.   Care advice provided per protocol, with recommendation to see MD within 4 hrs of call. Pt VU.     Reason for Disposition   History of heart disease (i.e., heart attack, bypass surgery, angina, angioplasty, CHF)  (Exception: Brief heartbeat symptoms that went away and now feels well.)    Additional Information   Negative: Passed out (e.g., fainted, lost consciousness, blacked out and was not responding)   Negative: Shock suspected (e.g., cold/pale/clammy skin, too weak to stand, low BP, rapid pulse)   Negative: Difficult to awaken or acting confused (e.g., disoriented, slurred speech)   Negative: Visible sweat on face or sweat dripping down face   Negative: Unable to walk, or can only walk with assistance (e.g., requires support)   Negative: [1] Received SHOCK from implantable cardiac defibrillator AND [2] persisting symptoms (i.e., palpitations, lightheadedness)   Negative: [1] Dizziness, lightheadedness, or weakness AND [2] heart beating very rapidly (e.g., > 140 / minute)   Negative: [1] Feeling weak or lightheaded (e.g., woozy, feeling like they might faint) AND [2] heart beating very slowly (e.g., < 50 / minute)   Negative: Sounds like a life-threatening emergency to the triager   Negative: Difficulty breathing   Negative: Feeling weak or lightheaded (e.g., woozy, feeling like they might faint)   Negative: [1] Heart beating very rapidly (e.g., > 140 / minute) AND [2] present now  (Exception: During exercise.)   Negative: Heart beating very slowly (e.g., < 50 / minute)  (Exception: Athlete and heart rate normal for caller.)   Negative: New or worsened shortness of breath with activity (dyspnea on exertion)   Negative: Patient sounds very  sick or weak to the triager   Negative: [1] Heart beating very rapidly (e.g., > 140 / minute) AND [2] not present now  (Exception: During exercise.)   Negative: [1] Skipped or extra beat(s) AND [2] increases with exercise or exertion   Negative: [1] Skipped or extra beat(s) AND [2] occurs 4 or more times per minute   Negative: New or worsened ankle swelling    Protocols used: Heart Rate and Heartbeat [id number]

## 2025-03-29 NOTE — H&P
[facility] - Emergency Dept  [facility] Medicine  History & Physical    Patient Name: Jhonatan Sofia  MRN: [id number]  Patient Class: OP- Observation  Admission Date: 3/29/2025  Attending Physician: Miguel Cortes MD   Primary Care Provider: Jose Sanchez MD         Patient information was obtained from patient, spouse/SO, past medical records, and ER records.     Subjective:     Principal Problem:Atrial fibrillation    Chief Complaint:   Chief Complaint   Patient presents with    abnormal heart rate     Cardioversion on Wednesday for afib. Cardio-mobile showing elevated hr. Denies CP or other symptoms.         HPI: 60-year-old gentleman past medical history of atrial arrhythmia, recently had RADHIKA with cardioversion done 3 days ago and was discharged on apixaban, remote ablations.  Came to hospital as his cardiac mobile device detected AFib and was advised by his cardiologist to come to hospital.  He was asymptomatic.  Recent echo done showed decreased EF, possible transient and secondary to the arrhythmia.  In the emergency room patient had transient tachycardia.  EKGs showed AFib with RVR.  Received oral metoprolol and oral phosphorus for correction.  Admit to Medicine team C    Past Medical History:   Diagnosis Date    Anticoagulant long-term use     Atrial fibrillation     Hyperlipidemia     Hypertension     Kidney stones        Past Surgical History:   Procedure Laterality Date    ABLATION OF ARRHYTHMOGENIC FOCUS FOR ATRIAL FIBRILLATION N/A 1/12/2022    Procedure: Ablation atrial fibrillation;  Surgeon: Scotty Shell MD;  Location: [facility] EP LAB;  Service: Cardiology;  Laterality: N/A;  AF/AFL, RADHIKA (Cx if SR), PVI, CTI, RFA, Carto, Gen, [place], 3 Prep    ABLATION OF ARRHYTHMOGENIC FOCUS FOR ATRIAL FIBRILLATION N/A 4/28/2023    Procedure: Ablation atrial fibrillation;  Surgeon: Scotty Shell MD;  Location: [facility] EP LAB;  Service: Cardiology;  Laterality: N/A;  AF/AFL, RADHIKA (Cx if SR), PVI redo, RFA for Atyp AFL, Carto,  Gen, [place], 3 Prep    ABLATION, ATRIAL FLUTTER, ATYPICAL N/A 4/28/2023    Procedure: Ablation, Atrial Flutter, Atypical;  Surgeon: Scotty Shell MD;  Location: [facility] EP LAB;  Service: Cardiology;  Laterality: N/A;    ABLATION, ATRIAL FLUTTER, TYPICAL  4/28/2023    Procedure: Ablation, Atrial Flutter, Typical;  Surgeon: Scotty Shell MD;  Location: [facility] EP LAB;  Service: Cardiology;;    CARDIOVERSION      COLONOSCOPY N/A 1/18/2024    Procedure: COLONOSCOPY;  Surgeon: Shukri Urbina MD;  Location: [facility] ENDO (4TH FLR);  Service: Endoscopy;  Laterality: N/A;  Ref by: Jose Sanchez MD  Prep: PEG  Instructions to portal  No longer taking Xarelto  DBM  10/16/23- change in Dr. Patel' scope schedule / Pt r/s at 4th flr  due to cardiac Hx/ see office visit dated 6/20/23, Pt no longer on Xarelto / prep ins on portal - PEG-ERW  1/11-    ECHOCARDIOGRAM,TRANSESOPHAGEAL N/A 3/26/2025    Procedure: Transesophageal echo (RADHIKA) intra-procedure log documentation;  Surgeon: Kristian Hawkins MD;  Location: [facility] EP LAB;  Service: Cardiology;  Laterality: N/A;    SHOULDER SURGERY      TREATMENT OF CARDIAC ARRHYTHMIA N/A 8/6/2020    Procedure: CARDIOVERSION;  Surgeon: Scotty Shell MD;  Location: [facility] EP LAB;  Service: Cardiology;  Laterality: N/A;  AF, RADHIKA, DCCV, MAC, [place], 3 Prep    TREATMENT OF CARDIAC ARRHYTHMIA N/A 3/26/2025    Procedure: Cardioversion or Defibrillation;  Surgeon: Scotty Shell MD;  Location: [facility] EP LAB;  Service: Cardiology;  Laterality: N/A;  AF, RADHIKA, DCCV, MAC, OR, 3 Prep       Review of patient's allergies indicates:  No Known Allergies    Current Facility-Administered Medications on File Prior to Encounter   Medication    sodium chloride 0.9% bolus 1,000 mL    sodium chloride 0.9% bolus 1,000 mL     Current Outpatient Medications on File Prior to Encounter   Medication Sig    apixaban (ELIQUIS) 5 mg Tab Take 1 tablet (5 mg total) by mouth 2 (two) times daily.     Family History       Problem Relation (Age of  Onset)    Arthritis Sister, Sister    Heart disease Father    Kidney failure Father    Liver disease Sister          Tobacco Use    Smoking status: Never     Passive exposure: Never    Smokeless tobacco: Never   Substance and Sexual Activity    Alcohol use: Yes     Comment: socially    Drug use: Never    Sexual activity: Not Currently     Partners: Female     Review of Systems   All other systems reviewed and are negative.    Objective:     Vital Signs (Most Recent):  Temp: 97.9 °F (36.6 °C) (03/29/25 1237)  Pulse: 88 (03/29/25 1538)  Resp: 16 (03/29/25 1538)  BP: (!) 128/92 (03/29/25 1538)  SpO2: 98 % (03/29/25 1538) Vital Signs (24h Range):  Temp:  [97.9 °F (36.6 °C)] 97.9 °F (36.6 °C)  Pulse:  [] 88  Resp:  [15-18] 16  SpO2:  [96 %-98 %] 98 %  BP: (124-142)/(79-96) 128/92     Weight: 102.1 kg (225 lb)  Body mass index is 30.52 kg/m².     Physical Exam  Constitutional:       General: He is not in acute distress.  HENT:      Head: Normocephalic.      Right Ear: External ear normal.      Left Ear: External ear normal.      Nose: Nose normal.   Eyes:      General: No scleral icterus.  Neck:      Comments: NO JVD  Cardiovascular:      Rate and Rhythm: Normal rate.      Heart sounds: Normal heart sounds.   Pulmonary:      Breath sounds: Normal breath sounds.   Abdominal:      Palpations: Abdomen is soft.      Tenderness: There is no abdominal tenderness.   Musculoskeletal:      Right lower leg: No edema.      Left lower leg: No edema.   Skin:     General: Skin is warm.   Neurological:      General: No focal deficit present.      Mental Status: He is alert and oriented to person, place, and time.   Psychiatric:         Mood and Affect: Mood normal.         Thought Content: Thought content normal.                Significant Labs: All pertinent labs within the past 24 hours have been reviewed.    Significant Imaging: I have reviewed all pertinent imaging results/findings within the past 24 hours.  Assessment/Plan:      Assessment & Plan  Atrial fibrillation  Recent cardioversion to sinus rhythm however re-presented with AFib with RVR.  Transient tachycardia noted.  Received oral beta blocker in the ER.  We will continue with metoprolol tartrate 25 mg b.i.d. and p.r.n.IV metoprolol.  Tele monitoring.  NPO after midnight  EP consult.  Continue apixaban  History of cardioversion      Anticoagulant long-term use  This patient has long term use on an anticoagulant with Select Anticoagulant(s): Direct oral anticoagulant: Apixaban (Eliquis). Their long term anticoagulation will be Held or Continued: continued. They are on long term anticoagulation due to Reason for Anticoagulation: Atrial fibrillation.   VTE Risk Mitigation (From admission, onward)           Ordered     apixaban tablet 5 mg  2 times daily         03/29/25 1607                       On 03/29/2025, patient should be placed in hospital observation services under my care.             Miguel Cortes MD  Department of Hospital Medicine  [facility] - Emergency Dept

## 2025-03-29 NOTE — ED NOTES
Went to assess pt following PO metoprolol administration. Noted that pulse oximetry HR source showed HR in the 80s, while cardiac monitor displayed 118.     Manual  via radial source. Dr. Evin MD made aware.

## 2025-03-29 NOTE — ASSESSMENT & PLAN NOTE
Recent cardioversion to sinus rhythm however re-presented with AFib with RVR.  Transient tachycardia noted.  Received oral beta blocker in the ER.  We will continue with metoprolol tartrate 25 mg b.i.d. and p.r.n.IV metoprolol.  Tele monitoring.  NPO after midnight  EP consult.  Continue apixaban

## 2025-03-29 NOTE — ED NOTES
Jhonatan Scotty Sofia, a 60 y.o. male presents to the ED w/ complaint of a-fib. Patient states that he was at work yesterday and, during a physical, the medical team noticed that he was in a-fib. States his cardio-mobile porfirio has also shown elevated HR. Denies SOB, CP, pain. Denies fatigue.

## 2025-03-29 NOTE — NURSING
Patient admitted to CSU. Patient arrived to floor from ED no evidence of distress; patient AAO x4 at this time. Patient placed on tele. Afib rate controlled. Vital signs obtained. Patient voices no complaints at this time. Plan of care initiated with patient. Bed in lowest position, locked, SR up x2, call bell in reach. Will continue to monitor patient.

## 2025-03-29 NOTE — ED NOTES
Telemetry Verification   Patient placed on Telemetry Box  Verified with War Room  Box # [id number]   Monitor Tech Gale   Rate 90   Rhythm Afib

## 2025-03-30 LAB
ANION GAP (OHS): 7 MMOL/L (ref 8–16)
BUN SERPL-MCNC: 20 MG/DL (ref 6–20)
CALCIUM SERPL-MCNC: 9.1 MG/DL (ref 8.7–10.5)
CHLORIDE SERPL-SCNC: 107 MMOL/L (ref 95–110)
CO2 SERPL-SCNC: 28 MMOL/L (ref 23–29)
CREAT SERPL-MCNC: 1.2 MG/DL (ref 0.5–1.4)
GFR SERPLBLD CREATININE-BSD FMLA CKD-EPI: >60 ML/MIN/1.73/M2
GLUCOSE SERPL-MCNC: 96 MG/DL (ref 70–110)
MAGNESIUM SERPL-MCNC: 1.8 MG/DL (ref 1.6–2.6)
OHS QRS DURATION: 76 MS
OHS QTC CALCULATION: 467 MS
POTASSIUM SERPL-SCNC: 4.5 MMOL/L (ref 3.5–5.1)
SODIUM SERPL-SCNC: 142 MMOL/L (ref 136–145)

## 2025-03-30 PROCEDURE — 20600001 HC STEP DOWN PRIVATE ROOM

## 2025-03-30 PROCEDURE — 36415 COLL VENOUS BLD VENIPUNCTURE: CPT | Performed by: INTERNAL MEDICINE

## 2025-03-30 PROCEDURE — 25000003 PHARM REV CODE 250: Performed by: INTERNAL MEDICINE

## 2025-03-30 PROCEDURE — 96366 THER/PROPH/DIAG IV INF ADDON: CPT

## 2025-03-30 PROCEDURE — 80048 BASIC METABOLIC PNL TOTAL CA: CPT | Performed by: INTERNAL MEDICINE

## 2025-03-30 PROCEDURE — 96365 THER/PROPH/DIAG IV INF INIT: CPT

## 2025-03-30 PROCEDURE — 83735 ASSAY OF MAGNESIUM: CPT | Performed by: INTERNAL MEDICINE

## 2025-03-30 PROCEDURE — 63600175 PHARM REV CODE 636 W HCPCS: Performed by: STUDENT IN AN ORGANIZED HEALTH CARE EDUCATION/TRAINING PROGRAM

## 2025-03-30 RX ADMIN — APIXABAN 5 MG: 5 TABLET, FILM COATED ORAL at 08:03

## 2025-03-30 RX ADMIN — AMIODARONE HYDROCHLORIDE 1 MG/MIN: 1.8 INJECTION, SOLUTION INTRAVENOUS at 11:03

## 2025-03-30 RX ADMIN — AMIODARONE HYDROCHLORIDE 0.5 MG/MIN: 1.8 INJECTION, SOLUTION INTRAVENOUS at 03:03

## 2025-03-30 RX ADMIN — METOPROLOL TARTRATE 25 MG: 25 TABLET, FILM COATED ORAL at 08:03

## 2025-03-30 RX ADMIN — AMIODARONE HYDROCHLORIDE 0.5 MG/MIN: 1.8 INJECTION, SOLUTION INTRAVENOUS at 07:03

## 2025-03-30 RX ADMIN — AMIODARONE HYDROCHLORIDE 150 MG: 1.5 INJECTION, SOLUTION INTRAVENOUS at 09:03

## 2025-03-30 RX ADMIN — AMIODARONE HYDROCHLORIDE 1 MG/MIN: 1.8 INJECTION, SOLUTION INTRAVENOUS at 09:03

## 2025-03-30 NOTE — SUBJECTIVE & OBJECTIVE
Past Medical History:   Diagnosis Date    Anticoagulant long-term use     Atrial fibrillation     Hyperlipidemia     Hypertension     Kidney stones        Past Surgical History:   Procedure Laterality Date    ABLATION OF ARRHYTHMOGENIC FOCUS FOR ATRIAL FIBRILLATION N/A 1/12/2022    Procedure: Ablation atrial fibrillation;  Surgeon: Scotty Shell MD;  Location: [facility] EP LAB;  Service: Cardiology;  Laterality: N/A;  AF/AFL, RADHIKA (Cx if SR), PVI, CTI, RFA, Carto, Gen, [place], 3 Prep    ABLATION OF ARRHYTHMOGENIC FOCUS FOR ATRIAL FIBRILLATION N/A 4/28/2023    Procedure: Ablation atrial fibrillation;  Surgeon: Scotty Shell MD;  Location: [facility] EP LAB;  Service: Cardiology;  Laterality: N/A;  AF/AFL, RADHIKA (Cx if SR), PVI redo, RFA for Atyp AFL, Carto, Gen, [place], 3 Prep    ABLATION, ATRIAL FLUTTER, ATYPICAL N/A 4/28/2023    Procedure: Ablation, Atrial Flutter, Atypical;  Surgeon: Scotty Shell MD;  Location: [facility] EP LAB;  Service: Cardiology;  Laterality: N/A;    ABLATION, ATRIAL FLUTTER, TYPICAL  4/28/2023    Procedure: Ablation, Atrial Flutter, Typical;  Surgeon: Scotty Shell MD;  Location: [facility] EP LAB;  Service: Cardiology;;    CARDIOVERSION      COLONOSCOPY N/A 1/18/2024    Procedure: COLONOSCOPY;  Surgeon: Shukri Urbina MD;  Location: [facility] ([address] [place] [address]);  Service: Endoscopy;  Laterality: N/A;  Ref by: Jose Sanchez MD  Prep: PEG  Instructions to portal  No longer taking Xarelto  DB  10/16/23- change in Dr. Patel' scope schedule / Pt r/s at [address] [place] [address] due to cardiac Hx/ see office visit dated 6/20/23, Pt no longer on Xarelto / prep ins on portal - PEG-ERW  1/11-    ECHOCARDIOGRAM,TRANSESOPHAGEAL N/A 3/26/2025    Procedure: Transesophageal echo (RADHIKA) intra-procedure log documentation;  Surgeon: Kristian Hawkins MD;  Location: [facility] EP LAB;  Service: Cardiology;  Laterality: N/A;    SHOULDER SURGERY      TREATMENT OF CARDIAC ARRHYTHMIA N/A 8/6/2020    Procedure: CARDIOVERSION;  Surgeon: Scotty Shell MD;  Location: [facility]  EP LAB;  Service: Cardiology;  Laterality: N/A;  AF, RADHIKA, DCCV, MAC, OR, 3 Prep    TREATMENT OF CARDIAC ARRHYTHMIA N/A 3/26/2025    Procedure: Cardioversion or Defibrillation;  Surgeon: Scotty Shell MD;  Location: [facility] EP LAB;  Service: Cardiology;  Laterality: N/A;  AF, RADHIKA, DCCV, MAC, OR, 3 Prep       Review of patient's allergies indicates:  No Known Allergies    Current Facility-Administered Medications   Medication    acetaminophen tablet 650 mg    apixaban tablet 5 mg    dextrose 50% injection 12.5 g    dextrose 50% injection 25 g    glucagon (human recombinant) injection 1 mg    glucose chewable tablet 16 g    glucose chewable tablet 24 g    metoprolol injection 5 mg    metoprolol tartrate (LOPRESSOR) tablet 25 mg    naloxone 0.4 mg/mL injection 0.02 mg    ondansetron tablet 4 mg    sodium chloride 0.9% flush 10 mL     Facility-Administered Medications Ordered in Other Encounters   Medication    sodium chloride 0.9% bolus 1,000 mL    sodium chloride 0.9% bolus 1,000 mL     Family History       Problem Relation (Age of Onset)    Arthritis Sister, Sister    Heart disease Father    Kidney failure Father    Liver disease Sister          Tobacco Use    Smoking status: Never     Passive exposure: Never    Smokeless tobacco: Never   Substance and Sexual Activity    Alcohol use: Yes     Comment: socially    Drug use: Never    Sexual activity: Not Currently     Partners: Female     Review of Systems   Constitutional: Negative.    Respiratory: Negative.     Cardiovascular: Negative.      Objective:     Vital Signs (Most Recent):  Temp: 97.6 °F (36.4 °C) (03/30/25 0554)  Pulse: 73 (03/30/25 0554)  Resp: 18 (03/30/25 0554)  BP: 117/80 (03/30/25 0554)  SpO2: 96 % (03/30/25 0554) Vital Signs (24h Range):  Temp:  [97.6 °F (36.4 °C)-97.9 °F (36.6 °C)] 97.6 °F (36.4 °C)  Pulse:  [] 73  Resp:  [15-18] 18  SpO2:  [94 %-98 %] 96 %  BP: (117-149)/(78-99) 117/80     Patient Vitals for the past 72 hrs (Last 3 readings):    Weight   03/29/25 1707 104.1 kg (229 lb 8 oz)   03/29/25 1237 102.1 kg (225 lb)     Body mass index is 31.13 kg/m².      Intake/Output Summary (Last 24 hours) at 3/30/2025 0649  Last data filed at 3/29/2025 2043  Gross per 24 hour   Intake 500 ml   Output --   Net 500 ml          Physical Exam  Constitutional:       Appearance: Normal appearance.   Cardiovascular:      Rate and Rhythm: Tachycardia present. Rhythm irregular.      Heart sounds: Normal heart sounds.   Pulmonary:      Effort: Pulmonary effort is normal.      Breath sounds: Normal breath sounds.   Musculoskeletal:      Right lower leg: No edema.      Left lower leg: No edema.   Skin:     General: Skin is warm.      Findings: No lesion.   Neurological:      General: No focal deficit present.      Mental Status: He is alert and oriented to person, place, and time.   Psychiatric:         Behavior: Behavior normal.            Significant Labs:  CBC:  Recent Labs   Lab 03/29/25  1321   WBC 4.19   RBC 4.80   HGB 15.3   HCT 45.2      MCV 94   MCH 31.9   MCHC 33.8     BNP:  Recent Labs   Lab 03/29/25  1321   *     CMP:  Recent Labs   Lab 03/29/25  1321   CALCIUM 9.4   ALBUMIN 4.0      K 3.9   CO2 24      BUN 19   CREATININE 1.0   ALKPHOS 53   ALT 24   AST 22   BILITOT 0.9      Microbiology Results (last 7 days)       ** No results found for the last 168 hours. **            I have reviewed all pertinent labs within the past 24 hours.    Diagnostic Results:  TTE 3/18/2025     RADHIKA performed prior to cardioversion. No LA/SURINDER thrombus.    Left Ventricle: The left ventricle is mildly dilated. Severe global hypokinesis present. There is severely reduced systolic function with a visually estimated ejection fraction of 25 - 30%. Ejection fraction is approximately 25%.    Right Ventricle: The right ventricle is normal in size. Systolic function is moderately reduced.    Left Atrium: Moderately dilated The left atrial appendage appears normal.  The left atrial appendage has a chicken wing morphology. Appendage velocity is normal at greater than 40 cm/sec. There is no thrombus in the left atrial appendage. The pulmonary veins have systolic blunting.    Right Atrium: Right atrium is mildly dilated.    Aortic Valve: There is mild aortic valve sclerosis. There is trace aortic regurgitation.    Mitral Valve: There is mild annular dilation. There is mild to moderate regurgitation with a centrally directed jet.    Aorta: Grade 2 atherosclerosis of the descending aorta.

## 2025-03-30 NOTE — PROGRESS NOTES
[facility] - Cardiology [facility] Medicine  Progress Note    Patient Name: Jhonatan Sofia  MRN: [id number]  Patient Class: OP- Observation   Admission Date: 3/29/2025  Length of Stay: 0 days  Attending Physician: Miguel Cortes MD  Primary Care Provider: Jose Sanchez MD        Subjective     Principal Problem:Atrial fibrillation        HPI:  60-year-old gentleman past medical history of atrial arrhythmia, recently had RADHIKA with cardioversion done 3 days ago and was discharged on apixaban, remote ablations.  Came to hospital as his cardiac mobile device detected AFib and was advised by his cardiologist to come to hospital.  He was asymptomatic.  Recent echo done showed decreased EF, possible transient and secondary to the arrhythmia.  In the emergency room patient had transient tachycardia.  EKGs showed AFib with RVR.  Received oral metoprolol and oral phosphorus for correction.  Admit to Medicine team C    Overview/Hospital Course:  No notes on file    Interval History:  Still remains in AFib, rate controlled.  EP plans for cardioversion tomorrow    Review of Systems   All other systems reviewed and are negative.    Objective:     Vital Signs (Most Recent):  Temp: 97.8 °F (36.6 °C) (03/30/25 0712)  Pulse: 93 (03/30/25 0940)  Resp: 16 (03/30/25 0712)  BP: (!) 119/94 (03/30/25 0940)  SpO2: 96 % (03/30/25 0712) Vital Signs (24h Range):  Temp:  [97.6 °F (36.4 °C)-97.9 °F (36.6 °C)] 97.8 °F (36.6 °C)  Pulse:  [] 93  Resp:  [15-18] 16  SpO2:  [94 %-98 %] 96 %  BP: (108-149)/(78-99) 119/94     Weight: 104.1 kg (229 lb 8 oz)  Body mass index is 31.13 kg/m².    Intake/Output Summary (Last 24 hours) at 3/30/2025 0942  Last data filed at 3/30/2025 0840  Gross per 24 hour   Intake 740 ml   Output --   Net 740 ml           Physical Exam  Constitutional:       General: He is not in acute distress.  HENT:      Head: Normocephalic.      Right Ear: External ear normal.      Left Ear: External ear normal.      Nose:  Nose normal.   Eyes:      General: No scleral icterus.  Neck:      Comments: NO JVD  Cardiovascular:      Rate and Rhythm: Normal rate.      Heart sounds: Normal heart sounds.   Pulmonary:      Breath sounds: Normal breath sounds.   Abdominal:      Palpations: Abdomen is soft.      Tenderness: There is no abdominal tenderness.   Musculoskeletal:      Right lower leg: No edema.      Left lower leg: No edema.   Skin:     General: Skin is warm.   Neurological:      General: No focal deficit present.      Mental Status: He is alert and oriented to person, place, and time.   Psychiatric:         Mood and Affect: Mood normal.         Thought Content: Thought content normal.              Significant Labs: All pertinent labs within the past 24 hours have been reviewed.      Assessment & Plan  Atrial fibrillation  Recent cardioversion to sinus rhythm however re-presented with AFib with RVR.  Transient tachycardia noted.  Received oral beta blocker in the ER.  We will continue with metoprolol tartrate 25 mg b.i.d. and p.r.n.IV metoprolol.  Tele monitoring.  NPO after midnight  EP consult.  Continue apixaban  3/30  For cardioversion tomorrow  History of cardioversion      Anticoagulant long-term use  This patient has long term use on an anticoagulant with [employer] Anticoagulant(s): Direct oral anticoagulant: Apixaban (Eliquis). Their long term anticoagulation will be Held or Continued: continued. They are on long term anticoagulation due to Reason for Anticoagulation: Atrial fibrillation.   VTE Risk Mitigation (From admission, onward)           Ordered     apixaban tablet 5 mg  2 times daily         03/29/25 6468                    Discharge Planning   RUSSEL:      Code Status: Full Code   Medical Readiness for Discharge Date:                            Miguel Cortes MD  Department of Hospital Medicine   [facility] - Cardiology Stepdown

## 2025-03-30 NOTE — ASSESSMENT & PLAN NOTE
Patient with Hx of AF/AFL (PVI and CTI-line 1/12/2022), Diagnosed of AF in March of 2020. He underwent cardioversion 8/6/2020 with symptom improvement. Continued to have AF afterward and was placed on flecainide   1/29/2021, however pt still has AF despite flecainide. He had multiple AF breakthrough episodes. On 1/12/2022 he had successful CTI ablation. Successful pulmonary vein RF ablation.On 3/16/2022: Pt is 2 mo s/p PVI and CTI-line; Flecainide reduced to 50 mg BID. On 2/10/2023 holter monitor: Paroxysmal atrial fibrillation and flutter. RVR most noted with atrial flutter. AF burden ~78%. He elected for redo-ablation. On 4/28/2023: Mitral annular flutter diagnosed and underwent lateral mitral isthmus ablation with bidirectional block, repeat ablation of CTI for loss of CTI block, re-isolation of RSPV/RIPV and LA posterior wall isolation. On 3/26/25 pt was noted to be in AF s/p RADHIKA/DCCV discharged with eliquis.     - EKG: AF with    - Tele Rate controlled AF   - ECHO: Tachycardia induced cardiomyopathy   - PE: Euvolemic and asymptomatic   - AMADA?DS?-VASc Score for Atrial Fibrillation Stroke Risk: 2     Recommendation  - Continue with metoprolol titrate 25 mg BID   - Continue with eliquis 5 mg BID    - Will re-evaluate with EP study   - Continue with tele monitor

## 2025-03-30 NOTE — PROGRESS NOTES
[facility] Card[facility] Stepdown  Cardiac Electrophysiology  Progress Note    Admission Date: 3/29/2025  Code Status: Full Code   Attending Physician: Miguel Cortes MD   Expected Discharge Date:   Principal Problem:Atrial fibrillation    Subjective:     Interval History: rate controlled atrial fibrillation on telemetry    Objective:     Vital Signs (Most Recent):  Temp: 97.8 °F (36.6 °C) (03/30/25 1100)  Pulse: 63 (03/30/25 1100)  Resp: 17 (03/30/25 1100)  BP: (!) 98/59 (03/30/25 1100)  SpO2: (!) 94 % (03/30/25 1100) Vital Signs (24h Range):  Temp:  [97.6 °F (36.4 °C)-97.9 °F (36.6 °C)] 97.8 °F (36.6 °C)  Pulse:  [] 63  Resp:  [15-18] 17  SpO2:  [94 %-98 %] 94 %  BP: ()/(59-99) 98/59     Weight: 104.1 kg (229 lb 8 oz)  Body mass index is 31.13 kg/m².  SpO2: (!) 94 %    Physical Exam  Constitutional:       Appearance: He is well-developed.   HENT:      Head: Normocephalic and atraumatic.   Eyes:      Pupils: Pupils are equal, round, and reactive to light.   Neck:      Vascular: No JVD.   Cardiovascular:      Rate and Rhythm: Normal rate. Rhythm irregular.      Pulses: Normal pulses and intact distal pulses.   Pulmonary:      Effort: Pulmonary effort is normal. No respiratory distress.   Musculoskeletal:      Right lower leg: No edema.      Left lower leg: No edema.   Skin:     General: Skin is warm and dry.      Capillary Refill: Capillary refill takes less than 2 seconds.   Neurological:      General: No focal deficit present.      Mental Status: He is alert and oriented to person, place, and time.   Psychiatric:         Mood and Affect: Mood normal.         Thought Content: Thought content normal.       Significant Labs: EP:   Recent Labs   Lab 03/29/25  1321 03/30/25  0545    142   K 3.9 4.5    107   CO2 24 28   BUN 19 20   CREATININE 1.0 1.2   CALCIUM 9.4 9.1   ALBUMIN 4.0  --    BILITOT 0.9  --    ALKPHOS 53  --    AST 22  --    ALT 24  --    ANIONGAP 10 7*   WBC 4.19  --    HGB 15.3  --     HCT 45.2  --      --      Assessment and Plan:     * Atrial fibrillation  Patient with Hx of AF/AFL (PVI/CTI 1/2022). Diagnosed AF March 2020. He underwent cardioversion 8/6/2020 with symptom improvement. Continued to have AF afterward and was placed on flecainide with subsequent breakthrough on flecainide. 2/10/2023 holter monitor showed paroxysmal atrial fibrillation and flutter. RVR most noted with atrial flutter. AF burden ~78%. He elected for redo-ablation. On 4/28/2023: Mitral annular flutter diagnosed and underwent lateral mitral isthmus ablation with bidirectional block, repeat ablation of CTI for loss of CTI block, re-isolation of RSPV/RIPV and LA posterior wall isolation. On 3/26/25 pt was noted to be in AF s/p RADHIKA/DCCV discharged with eliquis. AF recurrence 3/28 and presented to the ED.    - EKG: AF with    - Tele Rate controlled AF   - ECHO: Tachycardia induced cardiomyopathy   - PE: Euvolemic and asymptomatic   - AMADA?DS?-VASc Score for Atrial Fibrillation Stroke Risk: 2     Recommendation  - Continue with metoprolol titrate 25 mg BID   - Continue with eliquis 5 mg BID    - Start IV amiodarone load for 24hrs and will transition to PO load after  - NPO for DCCV (no RADHIKA given clear SURINDER on RADHIKA 3/25/25 and compliant with eliquis)  - Continue with tele monitor   - Discussed outpatient redo ablation to check for any breakthrough along prior PVI/CTI/PWI/Mitral annulus.  - Repeat echo in 2-3 months to assess for improvement in EF once back in sinus rhythm  - Referral for outpatient sleep study for FANTA          Kojo Keen MD  Cardiac Electrophysiology  [facility] - Cardiology Stepdown

## 2025-03-30 NOTE — SUBJECTIVE & OBJECTIVE
Interval History: rate controlled atrial fibrillation on telemetry    Objective:     Vital Signs (Most Recent):  Temp: 97.8 °F (36.6 °C) (03/30/25 1100)  Pulse: 63 (03/30/25 1100)  Resp: 17 (03/30/25 1100)  BP: (!) 98/59 (03/30/25 1100)  SpO2: (!) 94 % (03/30/25 1100) Vital Signs (24h Range):  Temp:  [97.6 °F (36.4 °C)-97.9 °F (36.6 °C)] 97.8 °F (36.6 °C)  Pulse:  [] 63  Resp:  [15-18] 17  SpO2:  [94 %-98 %] 94 %  BP: ()/(59-99) 98/59     Weight: 104.1 kg (229 lb 8 oz)  Body mass index is 31.13 kg/m².  SpO2: (!) 94 %    Physical Exam  Constitutional:       Appearance: He is well-developed.   HENT:      Head: Normocephalic and atraumatic.   Eyes:      Pupils: Pupils are equal, round, and reactive to light.   Neck:      Vascular: No JVD.   Cardiovascular:      Rate and Rhythm: Normal rate. Rhythm irregular.      Pulses: Normal pulses and intact distal pulses.   Pulmonary:      Effort: Pulmonary effort is normal. No respiratory distress.   Musculoskeletal:      Right lower leg: No edema.      Left lower leg: No edema.   Skin:     General: Skin is warm and dry.      Capillary Refill: Capillary refill takes less than 2 seconds.   Neurological:      General: No focal deficit present.      Mental Status: He is alert and oriented to person, place, and time.   Psychiatric:         Mood and Affect: Mood normal.         Thought Content: Thought content normal.       Significant Labs: EP:   Recent Labs   Lab 03/29/25  1321 03/30/25  0545    142   K 3.9 4.5    107   CO2 24 28   BUN 19 20   CREATININE 1.0 1.2   CALCIUM 9.4 9.1   ALBUMIN 4.0  --    BILITOT 0.9  --    ALKPHOS 53  --    AST 22  --    ALT 24  --    ANIONGAP 10 7*   WBC 4.19  --    HGB 15.3  --    HCT 45.2  --      --

## 2025-03-30 NOTE — SUBJECTIVE & OBJECTIVE
Past Medical History:   Diagnosis Date    Anticoagulant long-term use     Atrial fibrillation     Hyperlipidemia     Hypertension     Kidney stones        Past Surgical History:   Procedure Laterality Date    ABLATION OF ARRHYTHMOGENIC FOCUS FOR ATRIAL FIBRILLATION N/A 1/12/2022    Procedure: Ablation atrial fibrillation;  Surgeon: Scotty Shell MD;  Location: [facility] EP LAB;  Service: Cardiology;  Laterality: N/A;  AF/AFL, RADHIKA (Cx if SR), PVI, CTI, RFA, Carto, Gen, [place], 3 Prep    ABLATION OF ARRHYTHMOGENIC FOCUS FOR ATRIAL FIBRILLATION N/A 4/28/2023    Procedure: Ablation atrial fibrillation;  Surgeon: Scotty Shell MD;  Location: [facility] EP LAB;  Service: Cardiology;  Laterality: N/A;  AF/AFL, RADHIKA (Cx if SR), PVI redo, RFA for Atyp AFL, Carto, Gen, [place], 3 Prep    ABLATION, ATRIAL FLUTTER, ATYPICAL N/A 4/28/2023    Procedure: Ablation, Atrial Flutter, Atypical;  Surgeon: Scotty Sehll MD;  Location: [facility] EP LAB;  Service: Cardiology;  Laterality: N/A;    ABLATION, ATRIAL FLUTTER, TYPICAL  4/28/2023    Procedure: Ablation, Atrial Flutter, Typical;  Surgeon: Scotty Shell MD;  Location: [facility] EP LAB;  Service: Cardiology;;    CARDIOVERSION      COLONOSCOPY N/A 1/18/2024    Procedure: COLONOSCOPY;  Surgeon: Shukri Urbina MD;  Location: [facility] ([address] [place] [address]);  Service: Endoscopy;  Laterality: N/A;  Ref by: Jose Sanchez MD  Prep: PEG  Instructions to portal  No longer taking Xarelto  DB  10/16/23- change in Dr. Patel' scope schedule / Pt r/s at [address] [place] [address] due to cardiac Hx/ see office visit dated 6/20/23, Pt no longer on Xarelto / prep ins on portal - PEG-ERW  1/11-    ECHOCARDIOGRAM,TRANSESOPHAGEAL N/A 3/26/2025    Procedure: Transesophageal echo (RADHIKA) intra-procedure log documentation;  Surgeon: Kristian Hawkins MD;  Location: [facility] EP LAB;  Service: Cardiology;  Laterality: N/A;    SHOULDER SURGERY      TREATMENT OF CARDIAC ARRHYTHMIA N/A 8/6/2020    Procedure: CARDIOVERSION;  Surgeon: Scotty Shell MD;  Location: [facility]  EP LAB;  Service: Cardiology;  Laterality: N/A;  AF, RADHIKA, DCCV, MAC, [place], 3 Prep    TREATMENT OF CARDIAC ARRHYTHMIA N/A 3/26/2025    Procedure: Cardioversion or Defibrillation;  Surgeon: Scotty Shell MD;  Location: [facility] EP LAB;  Service: Cardiology;  Laterality: N/A;  AF, RADHIKA, DCCV, MAC, [place], 3 Prep       Review of patient's allergies indicates:  No Known Allergies    Current Facility-Administered Medications on File Prior to Encounter   Medication    sodium chloride 0.9% bolus 1,000 mL    sodium chloride 0.9% bolus 1,000 mL     Current Outpatient Medications on File Prior to Encounter   Medication Sig    apixaban (ELIQUIS) 5 mg Tab Take 1 tablet (5 mg total) by mouth 2 (two) times daily.     Family History       Problem Relation (Age of Onset)    Arthritis Sister, Sister    Heart disease Father    Kidney failure Father    Liver disease Sister          Tobacco Use    Smoking status: Never     Passive exposure: Never    Smokeless tobacco: Never   Substance and Sexual Activity    Alcohol use: Yes     Comment: socially    Drug use: Never    Sexual activity: Not Currently     Partners: Female     Review of Systems   Constitutional: Negative.   Eyes: Negative.    Cardiovascular: Negative.    Respiratory: Negative.     Neurological: Negative.      Objective:     Vital Signs (Most Recent):  Temp: 97.6 °F (36.4 °C) (03/30/25 0554)  Pulse: 80 (03/30/25 0612)  Resp: 18 (03/30/25 0554)  BP: 117/80 (03/30/25 0554)  SpO2: 96 % (03/30/25 0554) Vital Signs (24h Range):  Temp:  [97.6 °F (36.4 °C)-97.9 °F (36.6 °C)] 97.6 °F (36.4 °C)  Pulse:  [] 80  Resp:  [15-18] 18  SpO2:  [94 %-98 %] 96 %  BP: (117-149)/(78-99) 117/80       Weight: 104.1 kg (229 lb 8 oz)  Body mass index is 31.13 kg/m².    SpO2: 96 %        Physical Exam  Constitutional:       Appearance: Normal appearance.   HENT:      Head: Atraumatic.   Cardiovascular:      Rate and Rhythm: Tachycardia present. Rhythm irregular.      Heart sounds: Normal heart sounds.    Pulmonary:      Effort: Pulmonary effort is normal.      Breath sounds: Normal breath sounds.   Musculoskeletal:      Right lower leg: No edema.      Left lower leg: No edema.   Skin:     General: Skin is warm.   Neurological:      General: No focal deficit present.      Mental Status: He is alert and oriented to person, place, and time.   Psychiatric:         Mood and Affect: Mood normal.         Behavior: Behavior normal.            Significant Labs:   Recent Labs   Lab 03/29/25  1321   WBC 4.19   HGB 15.3   HCT 45.2          Recent Labs   Lab 03/29/25  1321      K 3.9      CO2 24   BUN 19   CREATININE 1.0   GLUCOSE 93   CALCIUM 9.4   PHOS 2.5*       Recent Labs   Lab 03/29/25  1321   ALKPHOS 53   BILITOT 0.9   ALT 24   AST 22       Lab Results   Component Value Date    CHOL 201 (H) 02/06/2023    HDL 46 02/06/2023    LDLCALC 143.0 02/06/2023    TRIG 60 02/06/2023       Lab Results   Component Value Date    HGBA1C 5.7 06/20/2016       Lab Results   Component Value Date     (H) 03/29/2025         Significant Imaging:   TTE 3/18/25     RADHIKA performed prior to cardioversion. No LA/SURINDER thrombus.    Left Ventricle: The left ventricle is mildly dilated. Severe global hypokinesis present. There is severely reduced systolic function with a visually estimated ejection fraction of 25 - 30%. Ejection fraction is approximately 25%.    Right Ventricle: The right ventricle is normal in size. Systolic function is moderately reduced.    Left Atrium: Moderately dilated The left atrial appendage appears normal. The left atrial appendage has a chicken wing morphology. Appendage velocity is normal at greater than 40 cm/sec. There is no thrombus in the left atrial appendage. The pulmonary veins have systolic blunting.    Right Atrium: Right atrium is mildly dilated.    Aortic Valve: There is mild aortic valve sclerosis. There is trace aortic regurgitation.    Mitral Valve: There is mild annular dilation.  There is mild to moderate regurgitation with a centrally directed jet.    Aorta: Grade 2 atherosclerosis of the descending aorta.

## 2025-03-30 NOTE — HPI
58 y.o. male with PMHx of HTN, AF/AFL (PVI and CTI-line 1/12/2022), Diagnosed of AF in March of 2020. He underwent cardioversion 8/6/2020 with symptom improvement. Continued to have AF afterward and was placed on flecainide   1/29/2021, however pt still has AF despite flecainide. He had multiple AF breakthrough episodes. On 1/12/2022 he had successful CTI ablation. Successful pulmonary vein RF ablation.On 3/16/2022: Pt is 2 mo s/p PVI and CTI-line; Flecainide reduced to 50 mg BID. On 2/10/2023 holter monitor: Paroxysmal atrial fibrillation and flutter. RVR most noted with atrial flutter. AF burden ~78%. He elected for redo-ablation. On 4/28/2023: Mitral annular flutter diagnosed and underwent lateral mitral isthmus ablation with bidirectional block, repeat ablation of CTI for loss of CTI block, re-isolation of RSPV/RIPV and LA posterior wall isolation. On 3/26/25 pt was noted to be in AF s/p RADHIKA/DCCV discharged with eliquis.     EP consulted for AF     Upon encounter pt reported that he was informed to go to the ED after his cardiac mobile device detected Afib HR 100s 120s and was advised by his cardiologist to come to hospital. He was asymptomatic. Recent echo done showed decreased EF. Asymptomatic and euvolemic

## 2025-03-30 NOTE — PLAN OF CARE
Pt maintained free from falls/trauma/injuries and skin breakdown. Pt denied pain or discomfort. Plan of care reviewed. Pt verbalized understanding. All questions and concerns addressed.       Problem: Adult Inpatient Plan of Care  Goal: Plan of Care Review  Outcome: Progressing  Goal: Patient-Specific Goal (Individualized)  Outcome: Progressing  Goal: Absence of Hospital-Acquired Illness or Injury  Outcome: Progressing  Goal: Optimal Comfort and Wellbeing  Outcome: Progressing     Problem: Fall Injury Risk  Goal: Absence of Fall and Fall-Related Injury  Outcome: Progressing

## 2025-03-30 NOTE — PLAN OF CARE
Problem: Adult Inpatient Plan of Care  Goal: Plan of Care Review  Outcome: Progressing  Goal: Absence of Hospital-Acquired Illness or Injury  Outcome: Progressing  Goal: Readiness for Transition of Care  Outcome: Progressing     Problem: Fall Injury Risk  Goal: Absence of Fall and Fall-Related Injury  Outcome: Progressing    AAOx4 Plan of care discussed with patient. Patient has no complaints of chest pain/SOB/palpitations. Pt ambulating  independently, fall precautions in place,no falls/injuries through the shift.Discussed medications and care.Patient has no questions at this time. Amio gtts continuous at 0.5mg/min. Pt resting comfortably with no acute distress. Call light within reach,bed at lowest position.

## 2025-03-30 NOTE — ASSESSMENT & PLAN NOTE
Patient with Hx of AF/AFL (PVI/CTI 1/2022). Diagnosed AF March 2020. He underwent cardioversion 8/6/2020 with symptom improvement. Continued to have AF afterward and was placed on flecainide with subsequent breakthrough on flecainide. 2/10/2023 holter monitor showed paroxysmal atrial fibrillation and flutter. RVR most noted with atrial flutter. AF burden ~78%. He elected for redo-ablation. On 4/28/2023: Mitral annular flutter diagnosed and underwent lateral mitral isthmus ablation with bidirectional block, repeat ablation of CTI for loss of CTI block, re-isolation of RSPV/RIPV and LA posterior wall isolation. On 3/26/25 pt was noted to be in AF s/p RADHIKA/DCCV discharged with eliquis. AF recurrence 3/28 and presented to the ED.    - EKG: AF with    - Tele Rate controlled AF   - ECHO: Tachycardia induced cardiomyopathy   - PE: Euvolemic and asymptomatic   - AMADA?DS?-VASc Score for Atrial Fibrillation Stroke Risk: 2     Recommendation  - Continue with metoprolol titrate 25 mg BID   - Continue with eliquis 5 mg BID    - Start IV amiodarone load for 24hrs and will transition to PO load after  - NPO for DCCV (no RADHIKA given clear SURINDER on RADHIKA 3/25/25 and compliant with eliquis)  - Continue with tele monitor   - Discussed outpatient redo ablation to check for any breakthrough along prior PVI/CTI/PWI/Mitral annulus.  - Repeat echo in 2-3 months to assess for improvement in EF once back in sinus rhythm  - Referral for outpatient sleep study for FANTA

## 2025-03-30 NOTE — SUBJECTIVE & OBJECTIVE
Interval History:  Still remains in AFib, rate controlled.  EP plans for cardioversion tomorrow    Review of Systems   All other systems reviewed and are negative.    Objective:     Vital Signs (Most Recent):  Temp: 97.8 °F (36.6 °C) (03/30/25 0712)  Pulse: 93 (03/30/25 0940)  Resp: 16 (03/30/25 0712)  BP: (!) 119/94 (03/30/25 0940)  SpO2: 96 % (03/30/25 0712) Vital Signs (24h Range):  Temp:  [97.6 °F (36.4 °C)-97.9 °F (36.6 °C)] 97.8 °F (36.6 °C)  Pulse:  [] 93  Resp:  [15-18] 16  SpO2:  [94 %-98 %] 96 %  BP: (108-149)/(78-99) 119/94     Weight: 104.1 kg (229 lb 8 oz)  Body mass index is 31.13 kg/m².    Intake/Output Summary (Last 24 hours) at 3/30/2025 0942  Last data filed at 3/30/2025 0840  Gross per 24 hour   Intake 740 ml   Output --   Net 740 ml           Physical Exam  Constitutional:       General: He is not in acute distress.  HENT:      Head: Normocephalic.      Right Ear: External ear normal.      Left Ear: External ear normal.      Nose: Nose normal.   Eyes:      General: No scleral icterus.  Neck:      Comments: NO JVD  Cardiovascular:      Rate and Rhythm: Normal rate.      Heart sounds: Normal heart sounds.   Pulmonary:      Breath sounds: Normal breath sounds.   Abdominal:      Palpations: Abdomen is soft.      Tenderness: There is no abdominal tenderness.   Musculoskeletal:      Right lower leg: No edema.      Left lower leg: No edema.   Skin:     General: Skin is warm.   Neurological:      General: No focal deficit present.      Mental Status: He is alert and oriented to person, place, and time.   Psychiatric:         Mood and Affect: Mood normal.         Thought Content: Thought content normal.              Significant Labs: All pertinent labs within the past 24 hours have been reviewed.

## 2025-03-30 NOTE — ASSESSMENT & PLAN NOTE
Recent cardioversion to sinus rhythm however re-presented with AFib with RVR.  Transient tachycardia noted.  Received oral beta blocker in the ER.  We will continue with metoprolol tartrate 25 mg b.i.d. and p.r.n.IV metoprolol.  Tele monitoring.  NPO after midnight  EP consult.  Continue apixaban  3/30  For cardioversion tomorrow

## 2025-03-30 NOTE — CONSULTS
[facility] - Cardiology Stepdown  Cardiac Electrophysiology  Consult Note    Admission Date: 3/29/2025  Code Status: Full Code   Attending Provider: Miguel Cortes MD  Consulting Provider: Nicolas Rios MD  Principal Problem:Atrial fibrillation    Inpatient consult to Electrophysiology  Consult performed by: Nicolas Rios MD  Consult ordered by: Miguel Cortes MD        Subjective:     Atrial Fibrillation     HPI:   58 y.o. male with PMHx of HTN, AF/AFL (PVI and CTI-line 1/12/2022), Diagnosed of AF in March of 2020. He underwent cardioversion 8/6/2020 with symptom improvement. Continued to have AF afterward and was placed on flecainide   1/29/2021, however pt still has AF despite flecainide. He had multiple AF breakthrough episodes. On 1/12/2022 he had successful CTI ablation. Successful pulmonary vein RF ablation.On 3/16/2022: Pt is 2 mo s/p PVI and CTI-line; Flecainide reduced to 50 mg BID. On 2/10/2023 holter monitor: Paroxysmal atrial fibrillation and flutter. RVR most noted with atrial flutter. AF burden ~78%. He elected for redo-ablation. On 4/28/2023: Mitral annular flutter diagnosed and underwent lateral mitral isthmus ablation with bidirectional block, repeat ablation of CTI for loss of CTI block, re-isolation of RSPV/RIPV and LA posterior wall isolation. On 3/26/25 pt was noted to be in AF s/p RADHIKA/DCCV discharged with eliquis.     EP consulted for AF     Upon encounter pt reported that he was informed to go to the ED after his cardiac mobile device detected Afib HR 100s 120s and was advised by his cardiologist to come to hospital. He was asymptomatic. Recent echo done showed decreased EF. Asymptomatic and euvolemic     Past Medical History:   Diagnosis Date    Anticoagulant long-term use     Atrial fibrillation     Hyperlipidemia     Hypertension     Kidney stones        Past Surgical History:   Procedure Laterality Date    ABLATION OF ARRHYTHMOGENIC FOCUS FOR ATRIAL FIBRILLATION N/A 1/12/2022    Procedure:  Ablation atrial fibrillation;  Surgeon: Scotty Shell MD;  Location: [facility] EP LAB;  Service: Cardiology;  Laterality: N/A;  AF/AFL, RADHIKA (Cx if SR), PVI, CTI, RFA, Carto, Gen, [place], 3 Prep    ABLATION OF ARRHYTHMOGENIC FOCUS FOR ATRIAL FIBRILLATION N/A 4/28/2023    Procedure: Ablation atrial fibrillation;  Surgeon: Scotty Shell MD;  Location: [facility] EP LAB;  Service: Cardiology;  Laterality: N/A;  AF/AFL, RADHIKA (Cx if SR), PVI redo, RFA for Atyp AFL, Carto, Gen, [place], 3 Prep    ABLATION, ATRIAL FLUTTER, ATYPICAL N/A 4/28/2023    Procedure: Ablation, Atrial Flutter, Atypical;  Surgeon: Scotty Shell MD;  Location: [facility] EP LAB;  Service: Cardiology;  Laterality: N/A;    ABLATION, ATRIAL FLUTTER, TYPICAL  4/28/2023    Procedure: Ablation, Atrial Flutter, Typical;  Surgeon: Scotty Shell MD;  Location: [facility] EP LAB;  Service: Cardiology;;    CARDIOVERSION      COLONOSCOPY N/A 1/18/2024    Procedure: COLONOSCOPY;  Surgeon: Shukri Urbina MD;  Location: [facility] ENDO ([address] [place] [address]);  Service: Endoscopy;  Laterality: N/A;  Ref by: Joes Sanchez MD  Prep: PEG  Instructions to portal  No longer taking Xarelto  DBM  10/16/23- change in Dr. Patel' scope schedule / Pt r/s at [address] [place] [address] due to cardiac Hx/ see office visit dated 6/20/23, Pt no longer on Xarelto / prep ins on portal - PEG-ERW  1/11-    ECHOCARDIOGRAM,TRANSESOPHAGEAL N/A 3/26/2025    Procedure: Transesophageal echo (RADHIKA) intra-procedure log documentation;  Surgeon: Kristian Hawkins MD;  Location: [facility] EP LAB;  Service: Cardiology;  Laterality: N/A;    SHOULDER SURGERY      TREATMENT OF CARDIAC ARRHYTHMIA N/A 8/6/2020    Procedure: CARDIOVERSION;  Surgeon: Scotty Shell MD;  Location: [facility] EP LAB;  Service: Cardiology;  Laterality: N/A;  AF, RADHIKA, DCCV, MAC, [place], 3 Prep    TREATMENT OF CARDIAC ARRHYTHMIA N/A 3/26/2025    Procedure: Cardioversion or Defibrillation;  Surgeon: Scotty Shell MD;  Location: [facility] EP LAB;  Service: Cardiology;  Laterality: N/A;  AF, RADHIKA, DCCV, MAC,  [place], 3 Prep       Review of patient's allergies indicates:  No Known Allergies    Current Facility-Administered Medications on File Prior to Encounter   Medication    sodium chloride 0.9% bolus 1,000 mL    sodium chloride 0.9% bolus 1,000 mL     Current Outpatient Medications on File Prior to Encounter   Medication Sig    apixaban (ELIQUIS) 5 mg Tab Take 1 tablet (5 mg total) by mouth 2 (two) times daily.     Family History       Problem Relation (Age of Onset)    Arthritis Sister, Sister    Heart disease Father    Kidney failure Father    Liver disease Sister          Tobacco Use    Smoking status: Never     Passive exposure: Never    Smokeless tobacco: Never   Substance and Sexual Activity    Alcohol use: Yes     Comment: socially    Drug use: Never    Sexual activity: Not Currently     Partners: Female     Review of Systems   Constitutional: Negative.   Eyes: Negative.    Cardiovascular: Negative.    Respiratory: Negative.     Neurological: Negative.      Objective:     Vital Signs (Most Recent):  Temp: 97.6 °F (36.4 °C) (03/30/25 0554)  Pulse: 80 (03/30/25 0612)  Resp: 18 (03/30/25 0554)  BP: 117/80 (03/30/25 0554)  SpO2: 96 % (03/30/25 0554) Vital Signs (24h Range):  Temp:  [97.6 °F (36.4 °C)-97.9 °F (36.6 °C)] 97.6 °F (36.4 °C)  Pulse:  [] 80  Resp:  [15-18] 18  SpO2:  [94 %-98 %] 96 %  BP: (117-149)/(78-99) 117/80       Weight: 104.1 kg (229 lb 8 oz)  Body mass index is 31.13 kg/m².    SpO2: 96 %        Physical Exam  Constitutional:       Appearance: Normal appearance.   HENT:      Head: Atraumatic.   Cardiovascular:      Rate and Rhythm: Tachycardia present. Rhythm irregular.      Heart sounds: Normal heart sounds.   Pulmonary:      Effort: Pulmonary effort is normal.      Breath sounds: Normal breath sounds.   Musculoskeletal:      Right lower leg: No edema.      Left lower leg: No edema.   Skin:     General: Skin is warm.   Neurological:      General: No focal deficit present.      Mental Status: He  is alert and oriented to person, place, and time.   Psychiatric:         Mood and Affect: Mood normal.         Behavior: Behavior normal.            Significant Labs:   Recent Labs   Lab 03/29/25  1321   WBC 4.19   HGB 15.3   HCT 45.2          Recent Labs   Lab 03/29/25  1321      K 3.9      CO2 24   BUN 19   CREATININE 1.0   GLUCOSE 93   CALCIUM 9.4   PHOS 2.5*       Recent Labs   Lab 03/29/25  1321   ALKPHOS 53   BILITOT 0.9   ALT 24   AST 22       Lab Results   Component Value Date    CHOL 201 (H) 02/06/2023    HDL 46 02/06/2023    LDLCALC 143.0 02/06/2023    TRIG 60 02/06/2023       Lab Results   Component Value Date    HGBA1C 5.7 06/20/2016       Lab Results   Component Value Date     (H) 03/29/2025         Significant Imaging:   TTE 3/18/25     RADHIKA performed prior to cardioversion. No LA/SURINDER thrombus.    Left Ventricle: The left ventricle is mildly dilated. Severe global hypokinesis present. There is severely reduced systolic function with a visually estimated ejection fraction of 25 - 30%. Ejection fraction is approximately 25%.    Right Ventricle: The right ventricle is normal in size. Systolic function is moderately reduced.    Left Atrium: Moderately dilated The left atrial appendage appears normal. The left atrial appendage has a chicken wing morphology. Appendage velocity is normal at greater than 40 cm/sec. There is no thrombus in the left atrial appendage. The pulmonary veins have systolic blunting.    Right Atrium: Right atrium is mildly dilated.    Aortic Valve: There is mild aortic valve sclerosis. There is trace aortic regurgitation.    Mitral Valve: There is mild annular dilation. There is mild to moderate regurgitation with a centrally directed jet.    Aorta: Grade 2 atherosclerosis of the descending aorta.            Assessment and Plan:     * Atrial fibrillation  Patient with Hx of AF/AFL (PVI and CTI-line 1/12/2022), Diagnosed of AF in March of 2020. He underwent  cardioversion 8/6/2020 with symptom improvement. Continued to have AF afterward and was placed on flecainide   1/29/2021, however pt still has AF despite flecainide. He had multiple AF breakthrough episodes. On 1/12/2022 he had successful CTI ablation. Successful pulmonary vein RF ablation.On 3/16/2022: Pt is 2 mo s/p PVI and CTI-line; Flecainide reduced to 50 mg BID. On 2/10/2023 holter monitor: Paroxysmal atrial fibrillation and flutter. RVR most noted with atrial flutter. AF burden ~78%. He elected for redo-ablation. On 4/28/2023: Mitral annular flutter diagnosed and underwent lateral mitral isthmus ablation with bidirectional block, repeat ablation of CTI for loss of CTI block, re-isolation of RSPV/RIPV and LA posterior wall isolation. On 3/26/25 pt was noted to be in AF s/p RADHIKA/DCCV discharged with eliquis.     - EKG: AF with    - Tele Rate controlled AF   - ECHO: Tachycardia induced cardiomyopathy   - PE: Euvolemic and asymptomatic   - AMADA?DS?-VASc Score for Atrial Fibrillation Stroke Risk: 2     Recommendation  - Continue with metoprolol titrate 25 mg BID   - Continue with eliquis 5 mg BID    - Will re-evaluate with EP study   - Continue with tele monitor           Thank you for your consult. I will follow-up with patient. Please contact us if you have any additional questions. Attending attestation will follow; please review it. Rest of the care as per primary team.    Nicolas Rios MD  Cardiac Electrophysiology  [facility] - Cardiology Stepdown

## 2025-03-31 ENCOUNTER — EPISODE CHANGES (OUTPATIENT)
Dept: CARDIOLOGY | Facility: CLINIC | Age: 60
End: 2025-03-31

## 2025-03-31 ENCOUNTER — TELEPHONE (OUTPATIENT)
Dept: ELECTROPHYSIOLOGY | Facility: CLINIC | Age: 60
End: 2025-03-31
Payer: COMMERCIAL

## 2025-03-31 ENCOUNTER — ANESTHESIA EVENT (OUTPATIENT)
Dept: MEDSURG UNIT | Facility: HOSPITAL | Age: 60
DRG: 310 | End: 2025-03-31
Payer: COMMERCIAL

## 2025-03-31 ENCOUNTER — ANESTHESIA (OUTPATIENT)
Dept: MEDSURG UNIT | Facility: HOSPITAL | Age: 60
DRG: 310 | End: 2025-03-31
Payer: COMMERCIAL

## 2025-03-31 VITALS
OXYGEN SATURATION: 95 % | HEART RATE: 55 BPM | HEIGHT: 72 IN | BODY MASS INDEX: 31.08 KG/M2 | SYSTOLIC BLOOD PRESSURE: 115 MMHG | WEIGHT: 229.5 LBS | RESPIRATION RATE: 20 BRPM | DIASTOLIC BLOOD PRESSURE: 75 MMHG | TEMPERATURE: 97 F

## 2025-03-31 LAB
ALBUMIN SERPL BCP-MCNC: 3.4 G/DL (ref 3.5–5.2)
ALP SERPL-CCNC: 43 UNIT/L (ref 40–150)
ALT SERPL W/O P-5'-P-CCNC: 21 UNIT/L (ref 10–44)
ANION GAP (OHS): 7 MMOL/L (ref 8–16)
AST SERPL-CCNC: 17 UNIT/L (ref 11–45)
BILIRUB SERPL-MCNC: 0.3 MG/DL (ref 0.1–1)
BUN SERPL-MCNC: 19 MG/DL (ref 6–20)
CALCIUM SERPL-MCNC: 8.9 MG/DL (ref 8.7–10.5)
CHLORIDE SERPL-SCNC: 106 MMOL/L (ref 95–110)
CO2 SERPL-SCNC: 23 MMOL/L (ref 23–29)
CREAT SERPL-MCNC: 1.1 MG/DL (ref 0.5–1.4)
GFR SERPLBLD CREATININE-BSD FMLA CKD-EPI: >60 ML/MIN/1.73/M2
GLUCOSE SERPL-MCNC: 101 MG/DL (ref 70–110)
MAGNESIUM SERPL-MCNC: 1.7 MG/DL (ref 1.6–2.6)
OHS QRS DURATION: 90 MS
OHS QTC CALCULATION: 444 MS
POCT GLUCOSE: 135 MG/DL (ref 70–110)
POTASSIUM SERPL-SCNC: 4.2 MMOL/L (ref 3.5–5.1)
PROT SERPL-MCNC: 6.3 GM/DL (ref 6–8.4)
SODIUM SERPL-SCNC: 136 MMOL/L (ref 136–145)

## 2025-03-31 PROCEDURE — 63600175 PHARM REV CODE 636 W HCPCS: Performed by: STUDENT IN AN ORGANIZED HEALTH CARE EDUCATION/TRAINING PROGRAM

## 2025-03-31 PROCEDURE — 36415 COLL VENOUS BLD VENIPUNCTURE: CPT | Performed by: INTERNAL MEDICINE

## 2025-03-31 PROCEDURE — 25000003 PHARM REV CODE 250: Performed by: INTERNAL MEDICINE

## 2025-03-31 PROCEDURE — 80053 COMPREHEN METABOLIC PANEL: CPT | Performed by: INTERNAL MEDICINE

## 2025-03-31 PROCEDURE — 93010 ELECTROCARDIOGRAM REPORT: CPT | Mod: ,,, | Performed by: INTERNAL MEDICINE

## 2025-03-31 PROCEDURE — D9220A PRA ANESTHESIA: Mod: ANES,,, | Performed by: ANESTHESIOLOGY

## 2025-03-31 PROCEDURE — 63600175 PHARM REV CODE 636 W HCPCS: Performed by: ANESTHESIOLOGY

## 2025-03-31 PROCEDURE — 37000008 HC ANESTHESIA 1ST 15 MINUTES: Performed by: INTERNAL MEDICINE

## 2025-03-31 PROCEDURE — 63600175 PHARM REV CODE 636 W HCPCS: Performed by: NURSE ANESTHETIST, CERTIFIED REGISTERED

## 2025-03-31 PROCEDURE — 25000003 PHARM REV CODE 250: Performed by: NURSE ANESTHETIST, CERTIFIED REGISTERED

## 2025-03-31 PROCEDURE — 5A2204Z RESTORATION OF CARDIAC RHYTHM, SINGLE: ICD-10-PCS | Performed by: INTERNAL MEDICINE

## 2025-03-31 PROCEDURE — 92960 CARDIOVERSION ELECTRIC EXT: CPT | Performed by: INTERNAL MEDICINE

## 2025-03-31 PROCEDURE — 93005 ELECTROCARDIOGRAM TRACING: CPT

## 2025-03-31 PROCEDURE — 37000009 HC ANESTHESIA EA ADD 15 MINS: Performed by: INTERNAL MEDICINE

## 2025-03-31 PROCEDURE — 83735 ASSAY OF MAGNESIUM: CPT | Performed by: INTERNAL MEDICINE

## 2025-03-31 PROCEDURE — 92960 CARDIOVERSION ELECTRIC EXT: CPT | Mod: ,,, | Performed by: INTERNAL MEDICINE

## 2025-03-31 PROCEDURE — 63600175 PHARM REV CODE 636 W HCPCS: Performed by: INTERNAL MEDICINE

## 2025-03-31 PROCEDURE — D9220A PRA ANESTHESIA: Mod: CRNA,,, | Performed by: NURSE ANESTHETIST, CERTIFIED REGISTERED

## 2025-03-31 RX ORDER — LIDOCAINE HYDROCHLORIDE 20 MG/ML
INJECTION INTRAVENOUS
Status: DISCONTINUED | OUTPATIENT
Start: 2025-03-31 | End: 2025-03-31

## 2025-03-31 RX ORDER — METOPROLOL TARTRATE 25 MG/1
25 TABLET, FILM COATED ORAL 2 TIMES DAILY
Qty: 180 TABLET | Refills: 3 | Status: SHIPPED | OUTPATIENT
Start: 2025-03-31 | End: 2026-03-31

## 2025-03-31 RX ORDER — SODIUM CHLORIDE 0.9 % (FLUSH) 0.9 %
3 SYRINGE (ML) INJECTION
Status: DISCONTINUED | OUTPATIENT
Start: 2025-03-31 | End: 2025-03-31 | Stop reason: HOSPADM

## 2025-03-31 RX ORDER — VASOPRESSIN 20 [USP'U]/ML
INJECTION, SOLUTION INTRAMUSCULAR; SUBCUTANEOUS
Status: DISCONTINUED | OUTPATIENT
Start: 2025-03-31 | End: 2025-03-31

## 2025-03-31 RX ORDER — HALOPERIDOL LACTATE 5 MG/ML
0.5 INJECTION, SOLUTION INTRAMUSCULAR EVERY 10 MIN PRN
Status: DISCONTINUED | OUTPATIENT
Start: 2025-03-31 | End: 2025-03-31 | Stop reason: HOSPADM

## 2025-03-31 RX ORDER — GLUCAGON 1 MG
1 KIT INJECTION
Status: DISCONTINUED | OUTPATIENT
Start: 2025-03-31 | End: 2025-03-31 | Stop reason: HOSPADM

## 2025-03-31 RX ORDER — ETOMIDATE 2 MG/ML
INJECTION INTRAVENOUS
Status: DISCONTINUED | OUTPATIENT
Start: 2025-03-31 | End: 2025-03-31

## 2025-03-31 RX ORDER — DIPHENHYDRAMINE HYDROCHLORIDE 50 MG/ML
25 INJECTION, SOLUTION INTRAMUSCULAR; INTRAVENOUS EVERY 6 HOURS PRN
Status: DISCONTINUED | OUTPATIENT
Start: 2025-03-31 | End: 2025-03-31 | Stop reason: HOSPADM

## 2025-03-31 RX ORDER — AMIODARONE HYDROCHLORIDE 200 MG/1
TABLET ORAL
Qty: 34 TABLET | Refills: 11 | Status: SHIPPED | OUTPATIENT
Start: 2025-04-12 | End: 2026-04-24

## 2025-03-31 RX ORDER — FENTANYL CITRATE 50 UG/ML
25 INJECTION, SOLUTION INTRAMUSCULAR; INTRAVENOUS EVERY 5 MIN PRN
Status: DISCONTINUED | OUTPATIENT
Start: 2025-03-31 | End: 2025-03-31 | Stop reason: HOSPADM

## 2025-03-31 RX ORDER — DEXMEDETOMIDINE HYDROCHLORIDE 100 UG/ML
INJECTION, SOLUTION INTRAVENOUS
Status: DISCONTINUED | OUTPATIENT
Start: 2025-03-31 | End: 2025-03-31

## 2025-03-31 RX ORDER — AMIODARONE HYDROCHLORIDE 200 MG/1
400 TABLET ORAL 2 TIMES DAILY
Qty: 48 TABLET | Refills: 0 | Status: SHIPPED | OUTPATIENT
Start: 2025-03-31 | End: 2025-03-31

## 2025-03-31 RX ORDER — ONDANSETRON HYDROCHLORIDE 2 MG/ML
4 INJECTION, SOLUTION INTRAVENOUS ONCE AS NEEDED
Status: COMPLETED | OUTPATIENT
Start: 2025-03-31 | End: 2025-03-31

## 2025-03-31 RX ORDER — EPHEDRINE SULFATE 50 MG/ML
INJECTION, SOLUTION INTRAVENOUS
Status: DISCONTINUED | OUTPATIENT
Start: 2025-03-31 | End: 2025-03-31

## 2025-03-31 RX ORDER — MAGNESIUM SULFATE 1 G/100ML
1 INJECTION INTRAVENOUS ONCE
Status: COMPLETED | OUTPATIENT
Start: 2025-03-31 | End: 2025-03-31

## 2025-03-31 RX ADMIN — ONDANSETRON 4 MG: 2 INJECTION INTRAMUSCULAR; INTRAVENOUS at 10:03

## 2025-03-31 RX ADMIN — VASOPRESSIN 2 UNITS: 20 INJECTION INTRAVENOUS at 10:03

## 2025-03-31 RX ADMIN — ETOMIDATE 8 MG: 2 INJECTION, SOLUTION INTRAVENOUS at 09:03

## 2025-03-31 RX ADMIN — METOPROLOL TARTRATE 25 MG: 25 TABLET, FILM COATED ORAL at 09:03

## 2025-03-31 RX ADMIN — APIXABAN 5 MG: 5 TABLET, FILM COATED ORAL at 09:03

## 2025-03-31 RX ADMIN — MAGNESIUM SULFATE 1 G: 500 INJECTION, SOLUTION INTRAMUSCULAR; INTRAVENOUS at 09:03

## 2025-03-31 RX ADMIN — LIDOCAINE HYDROCHLORIDE 100 MG: 20 INJECTION INTRAVENOUS at 09:03

## 2025-03-31 RX ADMIN — VASOPRESSIN 1 UNITS: 20 INJECTION INTRAVENOUS at 10:03

## 2025-03-31 RX ADMIN — AMIODARONE HYDROCHLORIDE 0.5 MG/MIN: 1.8 INJECTION, SOLUTION INTRAVENOUS at 08:03

## 2025-03-31 RX ADMIN — DEXMEDETOMIDINE 8 MCG: 200 INJECTION, SOLUTION INTRAVENOUS at 09:03

## 2025-03-31 RX ADMIN — EPHEDRINE SULFATE 50 MG: 50 INJECTION INTRAVENOUS at 10:03

## 2025-03-31 NOTE — ANESTHESIA POSTPROCEDURE EVALUATION
Anesthesia Post Evaluation    Patient: Jhonatan Sofia    Procedure(s) Performed: Procedure(s) (LRB):  Cardioversion or Defibrillation (N/A)    Final Anesthesia Type: general      Patient location during evaluation: PACU  Patient participation: Yes- Able to Participate  Level of consciousness: awake and alert  Post-procedure vital signs: reviewed and stable  Pain management: adequate  Airway patency: patent    PONV status at discharge: nausea (controlled)  Anesthetic complications: no      Cardiovascular status: hemodynamically stable  Respiratory status: unassisted  Follow-up not needed.              Vitals Value Taken Time   /80 03/31/25 11:37   Temp 36.1 °C (97 °F) 03/31/25 11:30   Pulse 54 03/31/25 11:39   Resp 30 03/31/25 11:39   SpO2 100 % 03/31/25 11:39   Vitals shown include unfiled device data.      No case tracking events are documented in the log.      Pain/Alex Score: Pain Rating Prior to Med Admin: 0 (3/31/2025 10:13 AM)  Alex Score: 8 (3/31/2025 10:45 AM)

## 2025-03-31 NOTE — PLAN OF CARE
[facility] - Cardiology Stepdown  Discharge Final Note    Primary Care Provider: Jose Sanchez MD    Expected Discharge Date: 3/31/2025    Final Discharge Note (most recent)       Final Note - 03/31/25 1404          Final Note    Assessment Type Final Discharge Note     Anticipated Discharge Disposition Home or Self Care     Hospital Resources/Appts/Education Provided Appointments scheduled and added to AVS                 Shari Blakely LMSW  [facility]  [phone]      Future Appointments   Date Time Provider Department Center   4/2/2025  7:30 AM EKG, APPT [facility]   4/4/2025 10:30 AM Jose Sanchez MD [facility] IM [facility]   4/29/2025  2:00 PM EKG, APPT [facility]   4/29/2025  2:30 PM Shari Dc, HELADIO [facility]

## 2025-03-31 NOTE — DISCHARGE SUMMARY
[facility] Cardiology [facility] Medicine  Discharge Summary      Patient Name: Jhonatan Sofia  MRN: [id number]  Admission Date: 3/29/2025  Hospital Length of Stay: 1 days  Discharge Date and Time: 03/31/2025 1:18 PM  Attending Physician: Miguel Cortes MD   Discharging Provider: Miguel Cortes MD  Discharge Provider Team: [facility] MED C  Primary Care Provider: Jose Sanchez MD        HPI: 60-year-old gentleman past medical history of atrial arrhythmia, recently had RADHIKA with cardioversion done 3 days ago and was discharged on apixaban, remote ablations.  Came to hospital as his cardiac mobile device detected AFib and was advised by his cardiologist to come to hospital.  He was asymptomatic.  Recent echo done showed decreased EF, possible transient and secondary to the arrhythmia.  In the emergency room patient had transient tachycardia.  EKGs showed AFib with RVR.  Received oral metoprolol and oral phosphorus for correction.  Admit to Medicine team C       Procedure(s) (LRB):  Cardioversion or Defibrillation (N/A)      Hospital Course: Patient was admitted for AFib, EP consulted, started on amiodarone and underwent cardioversion to sinus rhythm.  During hospital stay was asymptomatic and stay was unremarkable, of note he was recently admitted and discharged for similar issues. On this occurrence,  he was discharged on p.o. amiodarone per EP recommendation, metoprolol  and to continue his apixaban.  He is to follow up with his primary care and is to adhere to his EP follow up      Physical Exam  Constitutional:       General: He is not in acute distress.  HENT:      Head: Normocephalic.      Right Ear: External ear normal.      Left Ear: External ear normal.      Nose: Nose normal.   Eyes:      General: No scleral icterus.  Neck:      Comments: NO JVD  Cardiovascular:      Rate and Rhythm: Normal rate.      Heart sounds: Normal heart sounds.   Pulmonary:      Breath sounds: Normal breath sounds.   Abdominal:       Palpations: Abdomen is soft.      Tenderness: There is no abdominal tenderness.   Musculoskeletal:      Right lower leg: No edema.      Left lower leg: No edema.   Skin:     General: Skin is warm.   Neurological:      General: No focal deficit present.      Mental Status: He is alert and oriented to person, place, and time.   Psychiatric:         Mood and Affect: Mood normal.         Thought Content: Thought content normal.        Consults:   Consults (From admission, onward)          Status Ordering Provider     Inpatient consult to Electrophysiology  Once        Provider:  (Not yet assigned)    Completed BAYLEE WASHBURN            Final Active Diagnoses:    Diagnosis Date Noted POA    PRINCIPAL PROBLEM:  Atrial fibrillation [I48.91] 08/06/2020 Yes    Anticoagulant long-term use [Z79.01] 11/29/2021 Not Applicable    History of cardioversion [Z92.89] 10/06/2020 Not Applicable      Problems Resolved During this Admission:      Discharged Condition: stable    Disposition: Home or Self Care    Follow Up:   Follow-up Information       Jose Sanchez MD Follow up in 1 week(s).    Specialty: Internal Medicine  Contact information:  [address]  [facility], [address]  [facility] [address]  [phone]                           Patient Instructions:   No discharge procedures on file.  Medications:  Reconciled Home Medications:      Medication List        START taking these medications      * amiodarone 200 MG Tab  Commonly known as: PACERONE  Take 2 tablets (400 mg total) by mouth 2 (two) times daily. for 12 days     * amiodarone 200 MG Tab  Commonly known as: PACERONE  Take 1 tablet (200 mg total) by mouth once daily.  Start taking on: April 12, 2025     metoprolol tartrate 25 MG tablet  Commonly known as: LOPRESSOR  Take 1 tablet (25 mg total) by mouth 2 (two) times daily.           * This list has 2 medication(s) that are the same as other medications prescribed for you. Read the directions carefully, and ask your  doctor or other care provider to review them with you.                CONTINUE taking these medications      apixaban 5 mg Tab  Commonly known as: ELIQUIS  Take 1 tablet (5 mg total) by mouth 2 (two) times daily.                  Pending Diagnostic Studies:       None          Indwelling Lines/Drains at time of discharge:   Lines/Drains/Airways       None                   Time spent on the discharge of patient: 40 minutes         Miguel Cortes MD  Department of Hospital Medicine  [facility] - Cardiology Stepdown

## 2025-03-31 NOTE — CARE UPDATE
Patient noted to be in NSR s/p cardioversion today.     Recommendations:     - Ok to be discharged.   - Can stop IV amiodarone. Start amiodarone 400mg PO bid x 12 days and then PO amiodarone 200mg daily.   - Stop metoprolol tartrate 25mg bid and start metoprolol succinate 25mg daily.     Plan discussed w/ EP staff.

## 2025-03-31 NOTE — PLAN OF CARE
Problem: Adult Inpatient Plan of Care  Goal: Plan of Care Review  Outcome: Met  Goal: Patient-Specific Goal (Individualized)  Outcome: Met  Goal: Absence of Hospital-Acquired Illness or Injury  Outcome: Met  Goal: Optimal Comfort and Wellbeing  Outcome: Met  Goal: Readiness for Transition of Care  Outcome: Met     Problem: Fall Injury Risk  Goal: Absence of Fall and Fall-Related Injury  Outcome: Met   Discharge instructions discussed and reviewed with patient     Questions answered, patient verbalized understanding of discharge instructions.     Patient is AOX4 / NAD    Patient remained free of falls and trauma, fall precautions are in place.    Patient is ambulating independently with slow steady gait. .    Patient denies reports of pain. Patients discharge medications delivered to patient at bedside.    Peripheral IV removed without incident, no bleeding noted.     Telemetry discontinued.     Pt in wheelchair and escorted off unit by transportation staff, with personal belongings.       All depts made aware of discharge. .

## 2025-03-31 NOTE — NURSING TRANSFER
Nursing Transfer Note      3/31/2025   12:17 PM    Nurse giving handoff:karen del rio  Nurse receiving handoff:andreia csu rn    Reason patient is being transferred: ep pacu 1 to 354    Transfer To: ep pacu 1 to 354    Transfer via stretcher    Transfer with cardiac monitoring, tele box on confirmed by tele tech    Transported by karen del rio    Transfer Vital Signs:  Blood Pressure:121/71  Heart Rate:55  O2:99%  Temperature:97  Respirations: 16    Telemetry: Box Number [id number], Rate 59, Rhythm pvcs sb, and Telemetry  kandace  Order for Tele Monitor? Yes    Additional Lines: none    Medicines sent: none    Any special needs or follow-up needed: none    Patient belongings transferred with patient:  in csu room    Chart send with patient: Yes    Notified: spouse    Patient reassessed at: 3/31/25 1130  Upon arrival to floor: cardiac monitor applied, patient oriented to room, call bell in reach, and bed in lowest position, pt assisted to bed in csu room.

## 2025-03-31 NOTE — SUBJECTIVE & OBJECTIVE
Interval History: rate controlled atrial fibrillation on telemetry. NAEON. No acute complaints. Pending cardioversion today.     Objective:     Vital Signs (Most Recent):  Temp: 98.7 °F (37.1 °C) (03/31/25 0815)  Pulse: (!) 59 (03/31/25 0815)  Resp: 20 (03/31/25 0815)  BP: 107/73 (03/31/25 0815)  SpO2: 97 % (03/31/25 0815) Vital Signs (24h Range):  Temp:  [96.4 °F (35.8 °C)-98.7 °F (37.1 °C)] 98.7 °F (37.1 °C)  Pulse:  [59-93] 59  Resp:  [17-20] 20  SpO2:  [94 %-97 %] 97 %  BP: ()/(59-94) 107/73     Weight: 104.1 kg (229 lb 8 oz)  Body mass index is 31.13 kg/m².  SpO2: 97 %    Physical Exam  Constitutional:       Appearance: He is well-developed.   HENT:      Head: Normocephalic and atraumatic.   Eyes:      Pupils: Pupils are equal, round, and reactive to light.   Neck:      Vascular: No JVD.   Cardiovascular:      Rate and Rhythm: Normal rate. Rhythm irregular.      Pulses: Normal pulses and intact distal pulses.   Pulmonary:      Effort: Pulmonary effort is normal. No respiratory distress.   Musculoskeletal:      Right lower leg: No edema.      Left lower leg: No edema.   Skin:     General: Skin is warm and dry.      Capillary Refill: Capillary refill takes less than 2 seconds.   Neurological:      General: No focal deficit present.      Mental Status: He is alert and oriented to person, place, and time.   Psychiatric:         Mood and Affect: Mood normal.         Thought Content: Thought content normal.       Significant Labs: EP:   Recent Labs   Lab 03/29/25  1321 03/30/25  0545 03/31/25  0434    142 136   K 3.9 4.5 4.2    107 106   CO2 24 28 23   BUN 19 20 19   CREATININE 1.0 1.2 1.1   CALCIUM 9.4 9.1 8.9   ALBUMIN 4.0  --  3.4*   BILITOT 0.9  --  0.3   ALKPHOS 53  --  43   AST 22  --  17   ALT 24  --  21   ANIONGAP 10 7* 7*   WBC 4.19  --   --    HGB 15.3  --   --    HCT 45.2  --   --      --   --      ROS

## 2025-03-31 NOTE — ANESTHESIA RELEASE NOTE
Anesthesia Release from PACU Note    Patient: Jhonatan Sofia    Procedure(s) Performed: Procedure(s) (LRB):  Cardioversion or Defibrillation (N/A)    Anesthesia type: general    Post pain: Adequate analgesia    Post assessment: no apparent anesthetic complications, tolerated procedure well and no evidence of recall    Post vital signs:   Vitals:    03/31/25 1130   BP: 121/71   Pulse: (!) 55   Resp: 20   Temp: 36.1 °C (97 °F)        Level of consciousness: awake, alert  and oriented    Nausea/Vomiting: nausea controlled    Complications: none    Airway Patency: patent    Respiratory: unassisted, spontaneous ventilation    Cardiovascular: stable and blood pressure at baseline

## 2025-03-31 NOTE — PROGRESS NOTES
Pt was transferred to cath lab holding 8 from echo lab. Upon arrival, pt's bp decreased 76/50. Pt drowsy but answer questions. Pt was diaphoretic. Pt s/p cardioversion x1. Pt transferred to ep pacu 1. Crna at bedside. Dr betancourt with anesthesia notified. Pt did receive metoprolol and eliquis po this am on 3rd floor see mar.  Amiodarone drip infusing .5mg/min iv.  Crna to received new orders from md. Will continue to monitor. See flowsheet.

## 2025-03-31 NOTE — NURSING
Pt resting comfortably and independently repositioned inIn recliner at bedside with wheels locked and skid-free foot wear on.   NAD noted at this time.     Respirations even and unlabored and visible chest rise noted.     Patient offered bathroom assistance and denies need at this time.     Pt instructed to call if assistance is needed.     Pt on continuous cardiac,  Pt denies complaints of distress or discomfort. Provider made aware of return to room.     Call light within reach. Family at bedside.     No needs at this time.     Will continue to monitor.

## 2025-03-31 NOTE — PROGRESS NOTES
[facility] Cardiology Stepdown  Cardiac Electrophysiology  Progress Note    Admission Date: 3/29/2025  Code Status: Full Code   Attending Physician: Miguel Cortes MD   Expected Discharge Date:   Principal Problem:Atrial fibrillation    Subjective:     Interval History: rate controlled atrial fibrillation on telemetry. NAEON. No acute complaints. Pending cardioversion today.     Objective:     Vital Signs (Most Recent):  Temp: 98.7 °F (37.1 °C) (03/31/25 0815)  Pulse: (!) 59 (03/31/25 0815)  Resp: 20 (03/31/25 0815)  BP: 107/73 (03/31/25 0815)  SpO2: 97 % (03/31/25 0815) Vital Signs (24h Range):  Temp:  [96.4 °F (35.8 °C)-98.7 °F (37.1 °C)] 98.7 °F (37.1 °C)  Pulse:  [59-93] 59  Resp:  [17-20] 20  SpO2:  [94 %-97 %] 97 %  BP: ()/(59-94) 107/73     Weight: 104.1 kg (229 lb 8 oz)  Body mass index is 31.13 kg/m².  SpO2: 97 %    Physical Exam  Constitutional:       Appearance: He is well-developed.   HENT:      Head: Normocephalic and atraumatic.   Eyes:      Pupils: Pupils are equal, round, and reactive to light.   Neck:      Vascular: No JVD.   Cardiovascular:      Rate and Rhythm: Normal rate. Rhythm irregular.      Pulses: Normal pulses and intact distal pulses.   Pulmonary:      Effort: Pulmonary effort is normal. No respiratory distress.   Musculoskeletal:      Right lower leg: No edema.      Left lower leg: No edema.   Skin:     General: Skin is warm and dry.      Capillary Refill: Capillary refill takes less than 2 seconds.   Neurological:      General: No focal deficit present.      Mental Status: He is alert and oriented to person, place, and time.   Psychiatric:         Mood and Affect: Mood normal.         Thought Content: Thought content normal.       Significant Labs: EP:   Recent Labs   Lab 03/29/25  1321 03/30/25  0545 03/31/25  0434    142 136   K 3.9 4.5 4.2    107 106   CO2 24 28 23   BUN 19 20 19   CREATININE 1.0 1.2 1.1   CALCIUM 9.4 9.1 8.9   ALBUMIN 4.0  --  3.4*   BILITOT 0.9  --   0.3   ALKPHOS 53  --  43   AST 22  --  17   ALT 24  --  21   ANIONGAP 10 7* 7*   WBC 4.19  --   --    HGB 15.3  --   --    HCT 45.2  --   --      --   --      ROS  Assessment and Plan:     * Atrial fibrillation  Patient with Hx of AF/AFL (PVI/CTI 1/2022). Diagnosed AF March 2020. He underwent cardioversion 8/6/2020 with symptom improvement. Continued to have AF afterward and was placed on flecainide with subsequent breakthrough on flecainide. 2/10/2023 holter monitor showed paroxysmal atrial fibrillation and flutter. RVR most noted with atrial flutter. AF burden ~78%. He elected for redo-ablation. On 4/28/2023: Mitral annular flutter diagnosed and underwent lateral mitral isthmus ablation with bidirectional block, repeat ablation of CTI for loss of CTI block, re-isolation of RSPV/RIPV and LA posterior wall isolation. On 3/26/25 pt was noted to be in AF s/p RADHIKA/DCCV discharged with eliquis. AF recurrence 3/28 and presented to the ED.    - EKG: AF with    - Tele Rate controlled AF   - ECHO: Tachycardia induced cardiomyopathy   - PE: Euvolemic and asymptomatic   - AMADA?DS?-VASc Score for Atrial Fibrillation Stroke Risk: 2     Recommendation  - Continue with metoprolol titrate 25 mg BID   - Continue with eliquis 5 mg BID    - Start IV amiodarone load for 24hrs and can transition to amiodarone 400mg bid PO x12 days followed by amiodarone 200mg daily.   - NPO for DCCV today (no RADHIKA given clear SURINDER on RADHIKA 3/25/25 and compliant with eliquis)  - Continue with tele monitor   - Discussed outpatient redo ablation to check for any breakthrough along prior PVI/CTI/PWI/Mitral annulus.  - Repeat echo in 2-3 months to assess for improvement in EF once back in sinus rhythm  - Referral for outpatient sleep study for FANTA          Nelida Solomon MD  Cardiac Electrophysiology  [facility] - Cardiology Stepdown

## 2025-03-31 NOTE — TRANSFER OF CARE
Anesthesia Transfer of Care Note    Patient: Jhonatan Sofia    Procedure(s) Performed: Procedure(s) (LRB):  Cardioversion or Defibrillation (N/A)    Patient location: PACU    Anesthesia Type: MAC    Transport from OR: Transported from OR on 6-10 L/min O2 by face mask with adequate spontaneous ventilation. Continuous ECG monitoring in transport. Continuous SpO2 monitoring in transport    Post pain: adequate analgesia    Post assessment: tolerated procedure well and no apparent anesthetic complications    Post vital signs: unstable    Level of consciousness: awake    Nausea/Vomiting: vomiting    Complications: other (see comments)    Transfer of care protocol was followedComments: Upon arrival to PACU patient became diaphoretic and hypotensive. Anesthesia team and primary team notified. IM ephedrine and vasopressin given. MD at bedside and available for nursing staff.      Last vitals: Visit Vitals  BP (!) 85/48   Pulse (!) 51   Temp 37.1 °C (98.7 °F) (Axillary)   Resp 20   Ht 6' (1.829 m)   Wt 104.1 kg (229 lb 8 oz)   SpO2 100%   BMI 31.13 kg/m²

## 2025-03-31 NOTE — NURSING
Recd a call from tele that pt had a 5 beat run of Vtach. Primary and cardiology made aware awaiting further advisement. Patient made aware.

## 2025-03-31 NOTE — PROGRESS NOTES
Pt noted with emesis. Dr betancourt at bedside. Zofran 4mg iv x1 given per md order. Pt received ephedrine 50mg IM by crna and vasopression iv x1 3 units by crna.  Will continue to monitor. See flowsheet.pt [email or web address]

## 2025-03-31 NOTE — NURSING
Nursing Transfer Note          Reason patient is being transferred: Procedure     Transfer From: CSU room 354    Transfer via >:Stretcher     Transfer with cardiac monitoring: Yes    Medicines sent: Amiodarone gtt/ Mag rider infusing    Patient belongings transferred with patient: No     Chart send with patient: Yes    Patient is alert and oriented x 4, NAD.     All depts made aware.

## 2025-03-31 NOTE — PLAN OF CARE
03/31/2025      Jhonatan Sofia  [address] [place] [address]          Hospital Medicine Dept.  [facility] [address] [place] [address] [phone] after hours  [phone] fax Jhonatan Sofia has been hospitalized at the [facility] since 3/29/2025.  Please excuse the patient from duties.  Patient may return on 04/03/2025.  No restrictions.     Please contact me if you have any questions.                  __________________________  Miguel Cortes MD  03/31/2025

## 2025-03-31 NOTE — ANESTHESIA PREPROCEDURE EVALUATION
03/31/2025  Pre-operative evaluation for Procedure(s) (LRB):  Cardioversion or Defibrillation (N/A)    Jhonatan Sofia is a 60 y.o. male     Problem List[1]    Review of patient's allergies indicates:  No Known Allergies    Medications Ordered Prior to Encounter[2]    Past Surgical History:   Procedure Laterality Date    ABLATION OF ARRHYTHMOGENIC FOCUS FOR ATRIAL FIBRILLATION N/A 1/12/2022    Procedure: Ablation atrial fibrillation;  Surgeon: Scotty Shell MD;  Location: [facility] EP LAB;  Service: Cardiology;  Laterality: N/A;  AF/AFL, RADHIKA (Cx if SR), PVI, CTI, RFA, Carto, Gen, MO, 3 Prep    ABLATION OF ARRHYTHMOGENIC FOCUS FOR ATRIAL FIBRILLATION N/A 4/28/2023    Procedure: Ablation atrial fibrillation;  Surgeon: Scotty Shell MD;  Location: [facility] EP LAB;  Service: Cardiology;  Laterality: N/A;  AF/AFL, RADHIKA (Cx if SR), PVI redo, RFA for Atyp AFL, Carto, Gen, MO, 3 Prep    ABLATION, ATRIAL FLUTTER, ATYPICAL N/A 4/28/2023    Procedure: Ablation, Atrial Flutter, Atypical;  Surgeon: Scotty Shell MD;  Location: [facility] EP LAB;  Service: Cardiology;  Laterality: N/A;    ABLATION, ATRIAL FLUTTER, TYPICAL  4/28/2023    Procedure: Ablation, Atrial Flutter, Typical;  Surgeon: Scotty Shell MD;  Location: [facility] EP LAB;  Service: Cardiology;;    CARDIOVERSION      COLONOSCOPY N/A 1/18/2024    Procedure: COLONOSCOPY;  Surgeon: Shukri Urbina MD;  Location: [facility] ([address] [place] [address]);  Service: Endoscopy;  Laterality: N/A;  Ref by: Jose Sanchez MD  Prep: PEG  Instructions to portal  No longer taking Xarelto  DBM  10/16/23- change in Dr. Patel' scope schedule / Pt r/s at [address] [place] [address] due to cardiac Hx/ see office visit dated 6/20/23, Pt no longer on Xarelto / prep ins on portal - PEG-ERW  1/11-    ECHOCARDIOGRAM,TRANSESOPHAGEAL N/A 3/26/2025    Procedure: Transesophageal echo (RADHIKA) intra-procedure log documentation;  Surgeon:  Kristian Hawkins MD;  Location: [facility] EP LAB;  Service: Cardiology;  Laterality: N/A;    SHOULDER SURGERY      TREATMENT OF CARDIAC ARRHYTHMIA N/A 8/6/2020    Procedure: CARDIOVERSION;  Surgeon: Scotty Shell MD;  Location: [facility] EP LAB;  Service: Cardiology;  Laterality: N/A;  AF, RADHIKA, DCCV, MAC, [place], 3 Prep    TREATMENT OF CARDIAC ARRHYTHMIA N/A 3/26/2025    Procedure: Cardioversion or Defibrillation;  Surgeon: Scotty Shell MD;  Location: [facility] EP LAB;  Service: Cardiology;  Laterality: N/A;  AF, RADHIKA, DCCV, MAC, MN, 3 Prep       Social History[3]      CBC:   Recent Labs     03/29/25  1321   WBC 4.19   RBC 4.80   HGB 15.3   HCT 45.2      MCV 94   MCH 31.9   MCHC 33.8       CMP:   Recent Labs     03/29/25  1321 03/30/25  0545 03/31/25  0434    142 136   K 3.9 4.5 4.2    107 106   CO2 24 28 23   BUN 19 20 19   CREATININE 1.0 1.2 1.1   MG 1.9 1.8 1.7   PHOS 2.5*  --   --    CALCIUM 9.4 9.1 8.9   ALBUMIN 4.0  --  3.4*   ALKPHOS 53  --  43   ALT 24  --  21   AST 22  --  17   BILITOT 0.9  --  0.3           Pre-op Assessment    I have reviewed the Patient Summary Reports.     I have reviewed the Nursing Notes. I have reviewed the NPO Status.      Review of Systems  Anesthesia Hx:  No problems with previous Anesthesia                Cardiovascular:     Hypertension    Dysrhythmias atrial fibrillation                                     Pulmonary:  Pulmonary Normal                       Renal/:   renal calculi               Hepatic/GI:  Hepatic/GI Normal                    Endocrine:  Endocrine Normal                Physical Exam    Airway:  Mallampati: II   Mouth Opening: Normal  Tongue: Normal    Chest/Lungs:  Normal Respiratory Rate    Heart:  Rhythm: Regular Rhythm        Anesthesia Plan  Type of Anesthesia, risks & benefits discussed:    Anesthesia Type: Gen Natural Airway  Intra-op Monitoring Plan: Standard ASA Monitors  Induction:  IV  Informed Consent: Informed consent signed with the Patient and  all parties understand the risks and agree with anesthesia plan.  All questions answered.   ASA Score: 3    Ready For Surgery From Anesthesia Perspective.     .           [1]   Patient Active Problem List  Diagnosis    Paroxysmal atrial fibrillation    Atrial fibrillation    History of cardioversion    Anticoagulant long-term use    History of radiofrequency ablation (RFA) procedure for cardiac arrhythmia    Atypical atrial flutter   [2]   Current Facility-Administered Medications on File Prior to Encounter   Medication Dose Route Frequency Provider Last Rate Last Admin    sodium chloride 0.9% bolus 1,000 mL  1,000 mL Intravenous Once Becky Quinones NP        sodium chloride 0.9% bolus 1,000 mL  1,000 mL Intravenous Once Becky Quinones, NP         Current Outpatient Medications on File Prior to Encounter   Medication Sig Dispense Refill    apixaban (ELIQUIS) 5 mg Tab Take 1 tablet (5 mg total) by mouth 2 (two) times daily. 60 tablet 2   [3]   Social History  Socioeconomic History    Marital status:     Number of children: 1   Occupational History     Employer: [employer]   Tobacco Use    Smoking status: Never     Passive exposure: Never    Smokeless tobacco: Never   Substance and Sexual Activity    Alcohol use: Yes     Comment: socially    Drug use: Never    Sexual activity: Not Currently     Partners: Female     Social Drivers of Health     Financial Resource Strain: Patient Declined (3/31/2025)    Overall Financial Resource Strain (CARDIA)     Difficulty of Paying Living Expenses: Patient declined   Food Insecurity: Patient Declined (3/31/2025)    Hunger Vital Sign     Worried About Running Out of Food in the Last Year: Patient declined     Ran Out of Food in the Last Year: Patient declined   Transportation Needs: Patient Declined (3/30/2025)    PRAPARE - Transportation     Lack of Transportation (Medical): Patient declined     Lack of Transportation (Non-Medical): Patient declined   Physical Activity:  Unknown (2/5/2023)    Exercise Vital Sign     Days of Exercise per Week: 5 days   Stress: Patient Declined (3/31/2025)    [employer] - Occupational Stress Questionnaire     Feeling of Stress : Patient declined   Housing Stability: Patient Declined (3/31/2025)    Housing Stability Vital Sign     Unable to Pay for Housing in the Last Year: Patient declined     Homeless in the Last Year: Patient declined

## 2025-03-31 NOTE — PLAN OF CARE
Pt aaox4 follows commands. Pt s/p cardioversion x1. Slight redness to right chest wall. No pain noted. Per dr coley ep fellow, amiodarone drip to be discontinued in ep pacu 1. Floor mds to start po amiodarone later. Csu rn aware. Vss. Pt did have episode of low bp. See previous notes and flowsheet. Pt tolerating zofran x1 iv. At this time denies n/v.  Pt likes cool cloth on forehead.  Pt's wife updated over phone. Verbalizes understanding. Dr madrid saw pt in ep pacu 1. Tele box on confirmed by tele tech. 12 lead EKG done per md order. See flowsheet for full assessment.

## 2025-03-31 NOTE — PLAN OF CARE
[facility] - Cardiology Stepdown  Initial Discharge Assessment       Primary Care Provider: Jose Sanchez MD    Admission Diagnosis: Dehydration [E86.0]  Hyperthyroidism [E05.90]  A-fib [I48.91]  Atrial fib/flutter, transient [I48.91, I48.92]  Congestive heart failure, unspecified HF chronicity, unspecified heart failure type [I50.9]    Admission Date: 3/29/2025  Expected Discharge Date: 4/1/2025    Transition of Care Barriers: None    Payor: AETNA / Plan: AET[employer] CHOICE POS / Product Type: Commercial /     Extended Emergency Contact Information  Primary Emergency Contact: Soraida Sofia  Address: [address] [place] [address] [place]  Mobile Phone: [phone]  Relation: Spouse    Discharge Plan A: Home with family  Discharge Plan B: Home      [id number] [address] [place] [address] AT [address] [address]  Phone: [phone] Fax: [phone]      Initial Assessment (most recent)       Adult Discharge Assessment - 03/31/25 1256          Discharge Assessment    Assessment Type Discharge Planning Assessment     Confirmed/corrected address, phone number and insurance Yes     Confirmed Demographics Correct on Facesheet     Source of Information patient;family;health record     Communicated RUSSEL with patient/caregiver Yes     Reason For Admission A fib     People in Home spouse     Do you expect to return to your current living situation? Yes     Do you have help at home or someone to help you manage your care at home? Yes     Who are your caregiver(s) and their phone number(s)? Soraida Sofia (spouse) [phone]     Prior to hospitilization cognitive status: Alert/Oriented     Current cognitive status: Alert/Oriented     Walking or Climbing Stairs Difficulty no     Dressing/Bathing Difficulty no     Equipment Currently Used at Home none     Readmission within 30 days? No     Patient currently being  followed by outpatient case management? No     Do you currently have service(s) that help you manage your care at home? No     Do you take prescription medications? Yes     Do you have prescription coverage? Yes     Do you have any problems affording any of your prescribed medications? No     Is the patient taking medications as prescribed? yes     Who is going to help you get home at discharge? Soraida Sofia (spouse) [phone]     How do you get to doctors appointments? car, drives self     Are you on dialysis? No     Do you take coumadin? No     Discharge Plan A Home with family     Discharge Plan B Home     DME Needed Upon Discharge  none     Discharge Plan discussed with: Patient;Spouse/sig other     Name(s) and Number(s) Soraida Sofia (spouse) [phone]     Transition of Care Barriers None                   SW met with pt and wife Soraida at bedside to discuss discharge planning.  Pt lives with Soraida and is independent with ambulation and ADLs.  No DME, HH, dialysis, or coumadin.  PCP is Dr Sanchez.  Pt will have transportation and assistance from Soraida at discharge.  Discharge Plan A and Plan B have been determined by review of patient's clinical status, future medical and therapeutic needs, and coverage/benefits for post-acute care in coordination with multidisciplinary team members.  SW name and ext on whiteboard; discharge planning booklet provided.  Will continue to follow.      Shari Blakely LMSW  [facility]  [phone]

## 2025-03-31 NOTE — TELEPHONE ENCOUNTER
Spoke with patient's wife. Patient went to the ER on Saturday and is s/p DCCV today. Discussed scheduling ablation procedure and wife agreed to the date of June 16, 2025 and will be contacted if an earlier date should become available.

## 2025-03-31 NOTE — PLAN OF CARE
Pt maintained free from falls/trauma/injuries and skin breakdown. Pt denied pain or discomfort. Cardioversion AM. NPO overnight.Plan of care reviewed. Pt verbalized understanding. All questions and concerns addressed.     Problem: Adult Inpatient Plan of Care  Goal: Plan of Care Review  Outcome: Progressing  Goal: Patient-Specific Goal (Individualized)  Outcome: Progressing  Goal: Optimal Comfort and Wellbeing  Outcome: Progressing     Problem: Fall Injury Risk  Goal: Absence of Fall and Fall-Related Injury  Outcome: Progressing

## 2025-04-02 ENCOUNTER — HOSPITAL ENCOUNTER (OUTPATIENT)
Dept: CARDIOLOGY | Facility: CLINIC | Age: 60
Discharge: HOME | End: 2025-04-02
Payer: COMMERCIAL

## 2025-04-02 DIAGNOSIS — I48.0 PAROXYSMAL ATRIAL FIBRILLATION: ICD-10-CM

## 2025-04-02 DIAGNOSIS — I48.4 ATYPICAL ATRIAL FLUTTER: ICD-10-CM

## 2025-04-02 LAB
OHS QRS DURATION: 94 MS
OHS QTC CALCULATION: 447 MS

## 2025-04-02 PROCEDURE — 93005 ELECTROCARDIOGRAM TRACING: CPT | Mod: S$GLB,,, | Performed by: INTERNAL MEDICINE

## 2025-04-02 PROCEDURE — 93010 ELECTROCARDIOGRAM REPORT: CPT | Mod: S$GLB,,, | Performed by: INTERNAL MEDICINE

## 2025-04-03 NOTE — PROGRESS NOTES
MEDICAL HISTORY:  Paroxysmal atrial fibrillation, status post ablation  Hypertension  Hyperlipidemia  Nephrolithiasis.  Right shoulder surgery.     SOCIAL HISTORY:  Alcohol use, few times up the month  Tobacco use, none.       Medications  Amiodarone 200 mg daily   Eliquis 5 mg b.i.d.   Metoprolol tartrate 25 mg b.i.d.    60-year-old male   Follow-up     Noted to have reoccurring atrial fibrillation.  Was at work March 18th of the physical.  ECG confirmed atrial fibrillation.  Underwent cardioversion March 26.  Presented to the ER March 29th where he noted his cardia via showing tachycardia.  Reoccurring atrial fibrillation underwent another cardioversion.   presently on Eliquis 5 mg b.i.d., it amiodarone and metoprolol described above.    Has a history of paroxysmal atrial fibrillation.  Underwent ablation therapy in 2022 and then March 2023.  Was on flecainide.  Eventually got off the medication in latter part 2023.  For awhile he is using in his cardiovascular sporadically the determine if he was in atrial fibrillation which was not the case but prior to the recent event he had an use it in his quite awhile.  That has asymptomatic.  That has not aware palpitations shortness for breath weakness chest pain.    Review of symptoms   No abdominal pain regular bowel function no difficulty urinating    Examination   Weight 229   BMI 31   Pulse 64   Blood pressure by me 130/82   Neck no thyromegaly   Chest clear breath sounds  Heart regular rate and rhythm   Abdominal exam is bowel sounds soft nontender  2+ carotid pulses no bruits 2+ dorsalis pedal pulses  Extremities no edema    Impression   Paroxysmal atrial fibrillation  Hyperlipidemia, prior lab testing   Prostate cancer screening    Plan   Lipid profile, PSA test  Attention appropriate diet physical activity      Actually exercises, jogs on a consistent basis.    noted on recent transesophageal echo the ejection fraction was 30% felt to be due to atrial  fibrillation

## 2025-04-04 ENCOUNTER — OFFICE VISIT (OUTPATIENT)
Dept: INTERNAL MEDICINE | Facility: CLINIC | Age: 60
End: 2025-04-04
Payer: COMMERCIAL

## 2025-04-04 ENCOUNTER — LAB VISIT (OUTPATIENT)
Dept: LAB | Facility: HOSPITAL | Age: 60
End: 2025-04-04
Attending: INTERNAL MEDICINE
Payer: COMMERCIAL

## 2025-04-04 VITALS
SYSTOLIC BLOOD PRESSURE: 120 MMHG | WEIGHT: 229.75 LBS | DIASTOLIC BLOOD PRESSURE: 84 MMHG | HEART RATE: 48 BPM | OXYGEN SATURATION: 97 % | BODY MASS INDEX: 31.12 KG/M2 | HEIGHT: 72 IN

## 2025-04-04 DIAGNOSIS — Z92.89 HISTORY OF CARDIOVERSION: ICD-10-CM

## 2025-04-04 DIAGNOSIS — Z12.5 PROSTATE CANCER SCREENING: ICD-10-CM

## 2025-04-04 DIAGNOSIS — I48.0 PAROXYSMAL ATRIAL FIBRILLATION: Primary | ICD-10-CM

## 2025-04-04 DIAGNOSIS — I10 ESSENTIAL HYPERTENSION: ICD-10-CM

## 2025-04-04 DIAGNOSIS — E78.5 HYPERLIPIDEMIA, UNSPECIFIED HYPERLIPIDEMIA TYPE: ICD-10-CM

## 2025-04-04 DIAGNOSIS — Z98.890 HISTORY OF RADIOFREQUENCY ABLATION (RFA) PROCEDURE FOR CARDIAC ARRHYTHMIA: ICD-10-CM

## 2025-04-04 LAB
ALBUMIN SERPL BCP-MCNC: 4.1 G/DL (ref 3.5–5.2)
ALP SERPL-CCNC: 60 UNIT/L (ref 40–150)
ALT SERPL W/O P-5'-P-CCNC: 82 UNIT/L (ref 10–44)
ANION GAP (OHS): 7 MMOL/L (ref 8–16)
AST SERPL-CCNC: 33 UNIT/L (ref 11–45)
BILIRUB SERPL-MCNC: 1.1 MG/DL (ref 0.1–1)
BUN SERPL-MCNC: 20 MG/DL (ref 6–20)
CALCIUM SERPL-MCNC: 9.7 MG/DL (ref 8.7–10.5)
CHLORIDE SERPL-SCNC: 104 MMOL/L (ref 95–110)
CHOLEST SERPL-MCNC: 179 MG/DL (ref 120–199)
CHOLEST/HDLC SERPL: 4.6 {RATIO} (ref 2–5)
CO2 SERPL-SCNC: 27 MMOL/L (ref 23–29)
CREAT SERPL-MCNC: 1.2 MG/DL (ref 0.5–1.4)
GFR SERPLBLD CREATININE-BSD FMLA CKD-EPI: >60 ML/MIN/1.73/M2
GLUCOSE SERPL-MCNC: 88 MG/DL (ref 70–110)
HDLC SERPL-MCNC: 39 MG/DL (ref 40–75)
HDLC SERPL: 21.8 % (ref 20–50)
LDLC SERPL CALC-MCNC: 126.8 MG/DL (ref 63–159)
NONHDLC SERPL-MCNC: 140 MG/DL
POTASSIUM SERPL-SCNC: 4.6 MMOL/L (ref 3.5–5.1)
PROT SERPL-MCNC: 7.4 GM/DL (ref 6–8.4)
PSA SERPL-MCNC: 0.34 NG/ML
SODIUM SERPL-SCNC: 138 MMOL/L (ref 136–145)
TRIGL SERPL-MCNC: 66 MG/DL (ref 30–150)

## 2025-04-04 PROCEDURE — 99999 PR PBB SHADOW E&M-EST. PATIENT-LVL III: CPT | Mod: PBBFAC,,, | Performed by: INTERNAL MEDICINE

## 2025-04-04 PROCEDURE — 84153 ASSAY OF PSA TOTAL: CPT

## 2025-04-04 PROCEDURE — 80053 COMPREHEN METABOLIC PANEL: CPT

## 2025-04-04 PROCEDURE — 80061 LIPID PANEL: CPT

## 2025-04-04 PROCEDURE — 36415 COLL VENOUS BLD VENIPUNCTURE: CPT

## 2025-04-11 DIAGNOSIS — I48.0 PAROXYSMAL ATRIAL FIBRILLATION: Primary | ICD-10-CM

## 2025-04-14 ENCOUNTER — HOSPITAL ENCOUNTER (EMERGENCY)
Facility: HOSPITAL | Age: 60
Discharge: HOME | End: 2025-04-14
Attending: STUDENT IN AN ORGANIZED HEALTH CARE EDUCATION/TRAINING PROGRAM
Payer: COMMERCIAL

## 2025-04-14 VITALS
TEMPERATURE: 98 F | DIASTOLIC BLOOD PRESSURE: 89 MMHG | RESPIRATION RATE: 20 BRPM | SYSTOLIC BLOOD PRESSURE: 143 MMHG | HEIGHT: 72 IN | WEIGHT: 229 LBS | OXYGEN SATURATION: 96 % | BODY MASS INDEX: 31.02 KG/M2 | HEART RATE: 41 BPM

## 2025-04-14 DIAGNOSIS — I10 ASYMPTOMATIC HYPERTENSION: Primary | ICD-10-CM

## 2025-04-14 DIAGNOSIS — R00.1 BRADYCARDIA: ICD-10-CM

## 2025-04-14 LAB
OHS QRS DURATION: 88 MS
OHS QTC CALCULATION: 434 MS

## 2025-04-14 PROCEDURE — 93010 ELECTROCARDIOGRAM REPORT: CPT | Mod: ,,, | Performed by: INTERNAL MEDICINE

## 2025-04-14 PROCEDURE — 99283 EMERGENCY DEPT VISIT LOW MDM: CPT | Mod: 25

## 2025-04-14 PROCEDURE — 93005 ELECTROCARDIOGRAM TRACING: CPT

## 2025-04-14 NOTE — DISCHARGE INSTRUCTIONS
It was a pleasure taking care of you today!     Home Care:   - Continue home medications as prescribed    Follow-Up Plan:  - Follow-up with primary care doctor within 3 - 5 days to discuss your recent ER visit and any additional concerns that you may have.  - Additional testing and/or evaluation as directed by your primary doctor    Return to the Emergency Department for symptoms including but not limited to: persistence or worsening of symptoms, shortness of breath or chest pain, inability to drink without vomiting, passing out/fainting/ loss of consciousness, or if you have other concerns.

## 2025-04-14 NOTE — ED TRIAGE NOTES
Jhonatan Scotty Sofia, a 60 y.o. male presents to the ED w/ complaint of hypertension. Patient takes blood thinners and denies chest pain. PMH: cardioversion approx. 2 weeks ago, HTN, Afib    Triage note:  Chief Complaint   Patient presents with    Hypertension     Patient reports that he has heart hx, states has been having increasing BP at home. States had Cardioversion last Monday, and started on Amiodarone after. Denies any headache, dizziness.      Review of patient's allergies indicates:  No Known Allergies  Past Medical History:   Diagnosis Date    Anticoagulant long-term use     Atrial fibrillation     Hyperlipidemia     Hypertension     Kidney stones

## 2025-04-14 NOTE — ED PROVIDER NOTES
Encounter Date: 4/14/2025       History     Chief Complaint   Patient presents with    Hypertension     Patient reports that he has heart hx, states has been having increasing BP at home. States had Cardioversion last Monday, and started on Amiodarone after. Denies any headache, dizziness.      HPI  Mr Sofia is a 60 YOM with PMH of afib, hld, and htn is presenting with hypertension.    Patient reports history of cardioversion due to his atrial fibrillation.  States that he was recently started on amiodarone.  He has noticed in the past 10 days he said steadily increasing blood pressure.  States that his average is usually in the 130s to 140s but now most recently he has had a max of 174 systolic blood pressure.  Patient denies any headache, chest pain, difficulty breathing, abdominal pain, or changes in vision.  States that he has no other complaints and otherwise feels quite well however his rising blood pressure is giving him anxiety.  Review of patient's allergies indicates:  No Known Allergies  Past Medical History:   Diagnosis Date    Anticoagulant long-term use     Atrial fibrillation     Hyperlipidemia     Hypertension     Kidney stones      Past Surgical History:   Procedure Laterality Date    ABLATION OF ARRHYTHMOGENIC FOCUS FOR ATRIAL FIBRILLATION N/A 1/12/2022    Procedure: Ablation atrial fibrillation;  Surgeon: Scotty Shell MD;  Location: [facility] EP LAB;  Service: Cardiology;  Laterality: N/A;  AF/AFL, RADHIKA (Cx if SR), PVI, CTI, RFA, Carto, Gen, [place], 3 Prep    ABLATION OF ARRHYTHMOGENIC FOCUS FOR ATRIAL FIBRILLATION N/A 4/28/2023    Procedure: Ablation atrial fibrillation;  Surgeon: Scotty Shell MD;  Location: [facility] EP LAB;  Service: Cardiology;  Laterality: N/A;  AF/AFL, RADHIKA (Cx if SR), PVI redo, RFA for Atyp AFL, Carto, Gen, [place], 3 Prep    ABLATION, ATRIAL FLUTTER, ATYPICAL N/A 4/28/2023    Procedure: Ablation, Atrial Flutter, Atypical;  Surgeon: Scotty Shell MD;  Location: [facility] EP LAB;  Service:  Cardiology;  Laterality: N/A;    ABLATION, ATRIAL FLUTTER, TYPICAL  4/28/2023    Procedure: Ablation, Atrial Flutter, Typical;  Surgeon: Scotty Shell MD;  Location: [facility] EP LAB;  Service: Cardiology;;    CARDIOVERSION      COLONOSCOPY N/A 1/18/2024    Procedure: COLONOSCOPY;  Surgeon: Shukri Urbina MD;  Location: [facility] ENDO (4TH FLR);  Service: Endoscopy;  Laterality: N/A;  Ref by: Jose Sanchez MD  Prep: PEG  Instructions to portal  No longer taking Xarelto  DBM  10/16/23- change in Dr. Patel' scope schedule / Pt r/s at 4th flr  due to cardiac Hx/ see office visit dated 6/20/23, Pt no longer on Xarelto / prep ins on portal - PEG-ERW  1/11-    ECHOCARDIOGRAM,TRANSESOPHAGEAL N/A 3/26/2025    Procedure: Transesophageal echo (RADHIKA) intra-procedure log documentation;  Surgeon: Kristian Hawkins MD;  Location: [facility] EP LAB;  Service: Cardiology;  Laterality: N/A;    SHOULDER SURGERY      TREATMENT OF CARDIAC ARRHYTHMIA N/A 8/6/2020    Procedure: CARDIOVERSION;  Surgeon: Scotty Shell MD;  Location: [facility] EP LAB;  Service: Cardiology;  Laterality: N/A;  AF, RADHIKA, DCCV, MAC, [place], 3 Prep    TREATMENT OF CARDIAC ARRHYTHMIA N/A 3/26/2025    Procedure: Cardioversion or Defibrillation;  Surgeon: Scotty Shell MD;  Location: [facility] EP LAB;  Service: Cardiology;  Laterality: N/A;  AF, RADHIKA, DCCV, MAC, [place], 3 Prep    TREATMENT OF CARDIAC ARRHYTHMIA N/A 3/31/2025    Procedure: Cardioversion or Defibrillation;  Surgeon: Juan Miguel Alatorre MD;  Location: [facility] EP LAB;  Service: Cardiology;  Laterality: N/A;  AF, DCCV ONLY, ANES, [place], 354     Family History   Problem Relation Name Age of Onset    Heart disease Father      Kidney failure Father      Liver disease Sister          fatty liver    Arthritis Sister      Arthritis Sister       Social History[1]  Review of Systems    Physical Exam     Initial Vitals [04/14/25 0441]   BP Pulse Resp Temp SpO2   (!) 163/98 (!) 43 18 98.6 °F (37 °C) 99 %      MAP       --         Physical  Exam    Nursing note and vitals reviewed.  Constitutional: He appears well-developed and well-nourished.   HENT:   Head: Normocephalic and atraumatic.   Eyes: Conjunctivae and EOM are normal. Pupils are equal, round, and reactive to light.   Cardiovascular:  Normal rate, regular rhythm, normal heart sounds and intact distal pulses.           No murmur heard.  Pulmonary/Chest: Breath sounds normal. No respiratory distress. He has no wheezes. He has no rhonchi. He has no rales.   Abdominal: Abdomen is soft. Bowel sounds are normal. He exhibits no distension. There is no abdominal tenderness.     Neurological: He is alert and oriented to person, place, and time.   Skin: Capillary refill takes less than 2 seconds.   Psychiatric: He has a normal mood and affect.         ED Course   Procedures  Labs Reviewed - No data to display       Imaging Results    None          Medications - No data to display  Medical Decision Making  Mr Sofia is a 60 YOM with PMH of afib, hld, and htn is presenting with hypertension.  On arrival patient is hemodynamically stable.  His blood pressure is 163/98.  Heart rate is 44 which he states is at his baseline.  Exam is grossly normal.  Based on my exam and interview patient is presenting with asymptomatic hypertension.  Given that he has no chest pain I have a very low suspicion for ACS or LATIA.  Therefore I will defer an ischemic workup.  I suspect his repeated checking of his blood pressure is likely increasing his anxiety about his blood pressure and may be exacerbating his blood pressure.  At this time would recommend discharge home and close follow up with the either his cardiologist or his PCP for further management of his blood pressure.  Return precautions provided.    Amount and/or Complexity of Data Reviewed  ECG/medicine tests:  Decision-making details documented in ED Course.               ED Course as of 04/14/25 0522   Mon Apr 14, 2025   0446 BP(!): 163/98 [AC]   0446 Pulse(!):  43 [AC]   0448 EKG 12-lead  Independently interpreted shows sinus bradycardia with first-degree AV block, rate 44, no STEMI [BD]   0516 ATTENDING PHYSICIAN ATTESTATION    I have repeated the key portions of the resident's history and physical face to face with the patient, reviewed and agree with the resident medical documentation except as noted below, and supervised and managed the medical care of the patient.      Jj Guzman MD  Department of Emergency Medicine   [BD]      ED Course User Index  [AC] Lulu Gomez MD  [BD] Jj Guzman MD                           Clinical Impression:  Final diagnoses:  [R00.1] Bradycardia  [I10] Asymptomatic hypertension (Primary)          ED Disposition Condition    Discharge Stable          ED Prescriptions    None       Follow-up Information       Follow up With Specialties Details Why Contact Info    Jose Sacnhez MD Internal Medicine Schedule an appointment as soon as possible for a visit  To discuss your recent ER visit and any additional concerns that you may have [address]  [facility] [address], [address]  [facility] [address]  [phone]      [facility] - Emergency Dept Emergency Medicine Go to  As needed, If symptoms worsen [address] [facility] [id number] [phone]               [1]   Social History  Tobacco Use    Smoking status: Never     Passive exposure: Never    Smokeless tobacco: Never   Substance Use Topics    Alcohol use: Yes     Comment: socially    Drug use: Never        Lulu Gomez MD  Resident  04/14/25 0522

## 2025-04-22 NOTE — PROGRESS NOTES
Mr. Sofia is a patient of Dr. Shell and was last seen in clinic 6/20/2023.      Subjective:   Patient ID:  Jhonatan Sofia is a 60 y.o. male who presents for follow up of Atrial Fibrillation  .     HPI:    Mr. Sofia is a 60 y.o. male with HTN, AF/AFL (PVI and CTI-line 1/12/2022, PVI/PWI/CTI/mitral annual AFL RFA 4/2023) here for follow up.     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 He works at [employer] and gets yearly on-site physical exams with electrocardiograms. He reports they have been normal until this past March of 2020 when he was diagnosed with atrial fibrillation. He had no obvious symptoms however his wife and daughter report some increased fatigue that they have noted. He was seen by Dr. Sanchez who referred him to general cardiology. Low dose metoprolol and eliquis were prescribed and he was referred for further evaluation. He has yet to start eliquis.     He exercises regularly with heart rate monitoring and notes at times he sustains his heart rate in the 180s-200 bpm range. He does not use exercise supplements, does not drink alcohol, and wife reports no symptoms consistent with sleep apnea. He had an exercise stress echocardiogram in 2017 which noted no ischemia and showed a structurally normal left ventricle with mild left atrial enlargement (LVEF 55%). A recent echocardiogram noted a LVEF of 50% with a severely enlarged left atrium. A recent TSH lab test indicated a euthyroid state.     Available electrocardiograms in Epic which show normal sinus rhythm until an ECG dated 5/14/2020 which showed atrial fibrillation with controlled ventricular response.     His [id number] score is 1 and we discussed that anticoagulation long-term is a risk-benefit shared decision analysis. I also discussed the goal to reduce symptomatic arrhythmic episodes by pharmacologic and/or procedural methods and  utilizing a rhythm versus a rate control strategy. He may have latent symptoms per his family (fatigue). Discussed regardless at his age and activity level as well as this being the first documented event I strongly recommend an attempt at restoration of sinus rhythm to assess symptomatic response. He understood and agreed. Discussed need to take eliquis prior and 4 weeks after cardioversion. He can then decide on whether he wants to continue it long-term. If needed would use flecainide for rhythm control. Discussed risk atrial fibrillation in high-endurance athletes and advised to limit his exercise effort to maintain acceptable age-related exercise heart rate.     8/6/2020: Cardioversion was successfully performed with restoration of normal sinus rhythm.     Spoke with Mr. Sofia regarding his recurrent AF after his DCCV. He reports he felt great for ~5 days after DCCV and then went back into AF. Discussed starting an anti-arrhythmic drug and then repeating a DCCV. He stated he would like to do that. 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 Will start flecainide 100mg bid Sunday and have my nurse schedule an outpatient DCCV Wednesday or Thursday. He should continue eliquis and metoprolol. He has not missed any eliquis and thus will defer RADHIKA.     10/6/2020: He underwent cardioversion 8/6 with symptom improvement. Continued to have AF afterward and was placed on flecainide. In SR when presented for subsequent DCCV which was aborted. He is here for follow up.   He continues to feel better in SR. Has had 2 breakthrough episodes when he delayed his medications. One lasting several hours. Given his young age and symptomatic breakthrough AF despite flecainide, he would likely be a good candidate for PVI. (UPDATE: Dr. Shell confirms good PVI candidate). Risks, benefits, and alternatives discussed with patient. He is interested in proceeding but cannot fit it into his schedule until  after the new year. Will have patient RTC in 3 mo to confirm. In interim, continue flecainide. He has chronic bradycardia and is asymptomatic, but skips his metoprolol some days if his BP is low. Will reduce to 12.5mg toprol. He remains on eliquis for CVA prophylaxis (CHADSVASc 1).     4/14/2021: Overall he feels he is well controlled on flecainide. Only identifies breakthrough when he misses or delays meds. Does notice fast HR while exercising - likely SR given gradual trend on monitor. He would prefer to continue current regimen for now if it is safe. EKG with stable intervals. Update echo to confirm stability of heart function. Plan for PVI if EF is down or he identifies consistent AF breakthrough despite flecainide. CHADSVASc 1 and he remains on eliquis.     11/29/2021: Pt is out of rhythm today despite flecainide. He has had multiple AF breakthrough episodes. No overt symptoms but he does have a history of fatigue with AF and given his young age, rhythm control is recommended. PVI previously recommended by Dr. Shell. We discussed risks, benefits, and alternatives to PVI. Pt agrees to proceed. In interim will obtain Holter to determine whether his arrhythmia is paroxysmal or persistent. If persistent - will stop flecainide now. CHADSVASC 1 on eliquis.     1/12/2022:  Successful CTI ablation. Successful pulmonary vein RF ablation.     3/16/2022: Pt is 2 mo s/p PVI and CTI-line. He is doing well from a rhythm standpoint, with no documented or symptomatic recurrence of arrhythmia since procedure. He does report more activity tolerance. Never felt palpitations. Feels well.  Reduce flecainide to 50mg BID. CHADSVASc 1 on xarelto.      8/24/2022: He is now 7 months s/p PVI and CTI-line. Continues to feel well, with no documented or symptomatic recurrence of arrhythmia since procedure.  Has restarted exercise without any limitations in activity tolerance. Possible junctional rhythm on EKG reverting to SR on rhythm  strip. Asymptomatic.  Will stop flecainide now and recheck EKG in 4 weeks. If WNL, RTC 6 mo. CHADSVASc 1 on xarelto. We discussed AF monitoring at home.     1/26/2023: He is in an atypical AFL, appears rate controlled. He desires redo ablation. Recommend continuing with extended holter for now to ensure he is rate controlled     2/10/2023 holter monitor: Paroxysmal atrial fibrillation and flutter. RVR most noted with atrial flutter. AF burden ~78%. He elected for redo-ablation.     4/28/2023: Mitral annular flutter diagnosed and underwent lateral mitral isthmus ablation with bidirectional block, repeat ablation of CTI for loss of CTI block, re-isolation of RSPV/RIPV and LA posterior wall isolation.     6/20/2023: Mr. Sofia returns for post-ablation follow-up. He feels great. BP not elevated at home. Looking back he rarely has BP readings that would be considered HTN, mostly 110s-120s/80s. He is on 25mg of metoprolol.   In clinic ECG notes sinus bradycardia with narrow QRS. Rate 49 bpm.  Mr. Sofia is a pleasant 58 year-old man with paroxysmal AF, typical AFL, and atypical AFL s/p PVI/CTI ablation 1/2022 with recurrence and then s/p redo-PVI/mitral isthmus ablation/LA posterior wall isolation/repeat CTI ablation 4/2023. He has remained in sinus rhythm. Looking at all of his BP readings I question HTN diagnosis. He measures his BP at home and he is 110s-120s/80s. Very low dose metoprolol is unlikely to create these numbers. [id number] is essentially 0. Would stop metoprolol/xarelto. He will continue to monitor his BP at home and if he starts to consistently get readings in the HTN range then would resume therapy and discuss resumption of xarelto.    Update (04/29/2025):    3/26/2025: Last week, he had an EKG done at his job which showed he was back in AF. He was asymptomatic. Eliquis was resumed.  Today, here for RADHIKA/DCCV. No acute complaints.    3/31/2025: Patient was admitted for AFib, EP consulted, started on  amiodarone and underwent cardioversion to sinus rhythm.  During hospital  l stay was asymptomatic and stay was unremarkable, of note he was recently admitted and discharged for similar issues. On this occurrence,  he was discharged on p.o. amiodarone per EP recommendation, metoprolol  and to continue his apixaban.     4/23/2025: Pt instructed to reduce the Amiodarone to 100 mg ( 1/2 tablet) once a day and stop metoprolol.    Today he says his heart rates have come up since his medication changes last week. Breathing significantly improved. Not sleeping as well since last cardioversion (frequent wakening). Has restarted exercise. Primarily walking with some running and rope jumping. BPs increased since starting amiodarone but are trending down again per home BP log. This /94.    He is currently taking eliquis 5mg BID for stroke prophylaxis and denies significant bleeding episodes. He is currently being treated with amiodarone 100mg daily for rhythm control. Metoprolol on hold due to bradycardia. Kidney function is stable, with a creatinine of 1.2 on 4/4/2025.    I have personally reviewed the patient's EKG today, which shows sinus rhythm at 60bpm. VT interval is 196. QRS is 86. QT is 434.    Relevant Cardiac Test Results:    RADHIKA (3/26/2025):    RADHIKA performed prior to cardioversion. No LA/SURINDER thrombus.    Left Ventricle: The left ventricle is mildly dilated. Severe global hypokinesis present. There is severely reduced systolic function with a visually estimated ejection fraction of 25 - 30%. Ejection fraction is approximately 25%.    Right Ventricle: The right ventricle is normal in size. Systolic function is moderately reduced.    Left Atrium: Moderately dilated The left atrial appendage appears normal. The left atrial appendage has a chicken wing morphology. Appendage velocity is normal at greater than 40 cm/sec. There is no thrombus in the left atrial appendage. The pulmonary veins have systolic blunting.     Right Atrium: Right atrium is mildly dilated.    Aortic Valve: There is mild aortic valve sclerosis. There is trace aortic regurgitation.    Mitral Valve: There is mild annular dilation. There is mild to moderate regurgitation with a centrally directed jet.    Aorta: Grade 2 atherosclerosis of the descending aorta.     Current Outpatient Medications   Medication Sig    amiodarone (PACERONE) 200 MG Tab Take 2 tablets (400 mg total) by mouth 2 (two) times a day for 12 days, THEN 1 tablet (200 mg total) once daily.    apixaban (ELIQUIS) 5 mg Tab Take 1 tablet (5 mg total) by mouth 2 (two) times daily.    metoprolol tartrate (LOPRESSOR) 25 MG tablet Take 1 tablet (25 mg total) by mouth 2 (two) times daily.     No current facility-administered medications for this visit.     Facility-Administered Medications Ordered in Other Visits   Medication    sodium chloride 0.9% bolus 1,000 mL    sodium chloride 0.9% bolus 1,000 mL       Review of Systems   Constitutional: Negative for malaise/fatigue.   Cardiovascular:  Negative for chest pain, dyspnea on exertion, irregular heartbeat, leg swelling and palpitations.   Respiratory:  Negative for shortness of breath.    Hematologic/Lymphatic: Negative for bleeding problem.   Skin:  Negative for rash.   Musculoskeletal:  Negative for myalgias.   Gastrointestinal:  Negative for hematemesis, hematochezia and nausea.   Genitourinary:  Negative for hematuria.   Neurological:  Negative for light-headedness.   Psychiatric/Behavioral:  Negative for altered mental status. The patient has insomnia.    Allergic/Immunologic: Negative for persistent infections.       Objective:          BP (!) 160/104 (Patient Position: Sitting)   Pulse 60   Wt 104.4 kg (230 lb 2.6 oz)   BMI 31.22 kg/m²     Physical Exam  Vitals and nursing note reviewed.   Constitutional:       Appearance: Normal appearance. He is well-developed.   HENT:      Head: Normocephalic.      Nose: Nose normal.   Eyes:      Pupils:  Pupils are equal, round, and reactive to light.   Cardiovascular:      Rate and Rhythm: Normal rate and regular rhythm.   Pulmonary:      Effort: No respiratory distress.   Musculoskeletal:         General: Normal range of motion.   Skin:     General: Skin is warm and dry.      Findings: No erythema.   Neurological:      Mental Status: He is alert and oriented to person, place, and time.   Psychiatric:         Speech: Speech normal.         Behavior: Behavior normal.           Lab Results   Component Value Date     04/04/2025     03/19/2025    K 4.6 04/04/2025    K 4.5 03/19/2025    MG 1.7 03/31/2025    BUN 20 04/04/2025    CREATININE 1.2 04/04/2025    ALT 82 (H) 04/04/2025    ALT 26 01/25/2023    AST 33 04/04/2025    AST 23 01/25/2023    HGB 15.3 03/29/2025    HGB 15.2 03/19/2025    HCT 45.2 03/29/2025    HCT 45.5 03/19/2025    TSH 1.976 03/29/2025    TSH 3.410 01/25/2023    LDLCALC 126.8 04/04/2025       Recent Labs   Lab 04/21/23  0928 03/19/25  1014   INR 1.3 H 1.0       Assessment:     1. Paroxysmal atrial fibrillation    2. History of radiofrequency ablation (RFA) procedure for cardiac arrhythmia    3. Anticoagulant long-term use    4. Atypical atrial flutter      Plan:     In summary, Mr. Sofia is a 60 y.o. male with HTN, AF/AFL (PVI and CTI-line 1/12/2022, PVI/PWI/CTI/mitral annual AFL RFA 4/2023) here for follow up.  Pt is s/p recurrence of AF identified during employee physical last month. Underwent DCCV 3/26/2025. AF recurred within a week and he underwent another DCCV+amio load 3/31/2025. RADHIKA 3/26 showed decrease in EF to 25-30%, likely secondary to AF, echo 5/2021 showed normal LVEF. He reports his breathing significantly improved since second DCCV although he does have more night wakening. BPs increased and he became very jody after amio load but rates have already improved after amio decreased to 100g daily last week. Toprol currently held secondary to bradycardia. BPs slowly  improving.  Ablation scheduled 6/16/2025. All questions answered.    Redo PVI scheduled for 6/16/2025.  Hold amio 2 weeks prior to procedure  Hold eliquis AM of procedure  RTC as scheduled following procedure    *A copy of this note has been sent to Dr. Shell*    Follow up as scheduled following procedure.      ------------------------------------------------------------------    GARO Moya, NP-C  Cardiac Electrophysiology

## 2025-04-23 ENCOUNTER — TELEPHONE (OUTPATIENT)
Dept: ELECTROPHYSIOLOGY | Facility: CLINIC | Age: 60
End: 2025-04-23
Payer: COMMERCIAL

## 2025-04-23 ENCOUNTER — PATIENT MESSAGE (OUTPATIENT)
Dept: ELECTROPHYSIOLOGY | Facility: CLINIC | Age: 60
End: 2025-04-23
Payer: COMMERCIAL

## 2025-04-23 NOTE — TELEPHONE ENCOUNTER
Attempted to call patient to discuss Dr Shell's recommendation for change in medication regimen due to low heart rate noted at recent ER visit. No answer received . Voicemail left advising that Dr Shell recommends that he stop metoprolol, and reduce his maintenance dose of amiodarone to 100mg daily. Patient portal message providing recommendations sent via portal as well with instruction to contact me if he had any questions.

## 2025-04-23 NOTE — TELEPHONE ENCOUNTER
Scotty Shell MD Rivere, Maryann E RN  And hold amiodarone 14 days prior       ---- Message from Scotty Shell MD sent at 4/23/2025  3:13 PM CDT -----  Stop metoprolol, reduce maintenance dose of amiodarone to 100mg daily  ----- Message -----  From: Chiquita Leo RN  Sent: 4/23/2025   2:08 PM CDT  To: Scotty Shell MD    Patient is scheduled for redo PVI/ PFA on 6/16/2025 and currently taking Amiodarone ( has one more day of loading dose) and Metoprolol 25 mg BID ( recent ER visit note HR 44) . Do you want patient to hold meds prior to the procedure?

## 2025-04-29 ENCOUNTER — HOSPITAL ENCOUNTER (OUTPATIENT)
Dept: CARDIOLOGY | Facility: CLINIC | Age: 60
Discharge: HOME | End: 2025-04-29
Payer: COMMERCIAL

## 2025-04-29 ENCOUNTER — OFFICE VISIT (OUTPATIENT)
Dept: ELECTROPHYSIOLOGY | Facility: CLINIC | Age: 60
End: 2025-04-29
Payer: COMMERCIAL

## 2025-04-29 VITALS
WEIGHT: 230.19 LBS | BODY MASS INDEX: 31.22 KG/M2 | DIASTOLIC BLOOD PRESSURE: 104 MMHG | SYSTOLIC BLOOD PRESSURE: 160 MMHG | HEART RATE: 60 BPM

## 2025-04-29 DIAGNOSIS — I48.0 PAROXYSMAL ATRIAL FIBRILLATION: Primary | ICD-10-CM

## 2025-04-29 DIAGNOSIS — Z79.01 ANTICOAGULANT LONG-TERM USE: ICD-10-CM

## 2025-04-29 DIAGNOSIS — I48.0 PAROXYSMAL ATRIAL FIBRILLATION: ICD-10-CM

## 2025-04-29 DIAGNOSIS — Z98.890 HISTORY OF RADIOFREQUENCY ABLATION (RFA) PROCEDURE FOR CARDIAC ARRHYTHMIA: ICD-10-CM

## 2025-04-29 DIAGNOSIS — I48.4 ATYPICAL ATRIAL FLUTTER: ICD-10-CM

## 2025-04-29 LAB
OHS QRS DURATION: 86 MS
OHS QTC CALCULATION: 434 MS

## 2025-04-29 PROCEDURE — 99214 OFFICE O/P EST MOD 30 MIN: CPT | Mod: S$GLB,,, | Performed by: NURSE PRACTITIONER

## 2025-04-29 PROCEDURE — 93005 ELECTROCARDIOGRAM TRACING: CPT | Mod: S$GLB,,, | Performed by: INTERNAL MEDICINE

## 2025-04-29 PROCEDURE — 93010 ELECTROCARDIOGRAM REPORT: CPT | Mod: S$GLB,,, | Performed by: INTERNAL MEDICINE

## 2025-04-29 PROCEDURE — 99999 PR PBB SHADOW E&M-EST. PATIENT-LVL III: CPT | Mod: PBBFAC,,, | Performed by: NURSE PRACTITIONER

## 2025-06-09 ENCOUNTER — TELEPHONE (OUTPATIENT)
Dept: ELECTROPHYSIOLOGY | Facility: CLINIC | Age: 60
End: 2025-06-09
Payer: COMMERCIAL

## 2025-06-09 ENCOUNTER — LAB VISIT (OUTPATIENT)
Dept: LAB | Facility: HOSPITAL | Age: 60
End: 2025-06-09
Attending: INTERNAL MEDICINE
Payer: COMMERCIAL

## 2025-06-09 DIAGNOSIS — I48.0 PAROXYSMAL ATRIAL FIBRILLATION: ICD-10-CM

## 2025-06-09 LAB
ANION GAP (OHS): 8 MMOL/L (ref 8–16)
APTT PPP: 31.1 SECONDS (ref 21–32)
BUN SERPL-MCNC: 18 MG/DL (ref 6–20)
CALCIUM SERPL-MCNC: 9.1 MG/DL (ref 8.7–10.5)
CHLORIDE SERPL-SCNC: 106 MMOL/L (ref 95–110)
CO2 SERPL-SCNC: 27 MMOL/L (ref 23–29)
CREAT SERPL-MCNC: 1.2 MG/DL (ref 0.5–1.4)
GFR SERPLBLD CREATININE-BSD FMLA CKD-EPI: >60 ML/MIN/1.73/M2
GLUCOSE SERPL-MCNC: 100 MG/DL (ref 70–110)
INR PPP: 1 (ref 0.8–1.2)
POTASSIUM SERPL-SCNC: 4.9 MMOL/L (ref 3.5–5.1)
PROTHROMBIN TIME: 10.9 SECONDS (ref 9–12.5)
SODIUM SERPL-SCNC: 141 MMOL/L (ref 136–145)

## 2025-06-09 PROCEDURE — 85730 THROMBOPLASTIN TIME PARTIAL: CPT

## 2025-06-09 PROCEDURE — 85025 COMPLETE CBC W/AUTO DIFF WBC: CPT

## 2025-06-09 PROCEDURE — 36415 COLL VENOUS BLD VENIPUNCTURE: CPT

## 2025-06-09 PROCEDURE — 80048 BASIC METABOLIC PNL TOTAL CA: CPT

## 2025-06-09 PROCEDURE — 85610 PROTHROMBIN TIME: CPT

## 2025-06-09 NOTE — TELEPHONE ENCOUNTER
Spoke to pt. Advised per Dr. Shell, ok to proceed. Advised to make sure he doesn't take anymore Amiodarone until after the procedure. Patient verbalized understanding and had no further questions.

## 2025-06-09 NOTE — TELEPHONE ENCOUNTER
----- Message from Lily sent at 6/9/2025 10:33 AM CDT -----  Regarding: Procedure  Pt [phone] would like a call in reference to his upcoming procedure.Thanks

## 2025-06-09 NOTE — TELEPHONE ENCOUNTER
Spoke to pt. Pt scheduled for redo PVI PFA on 6/16/25. He was supposed to hold Amiodarone for 14 days (last dose on 6/1/25), but pt thought it was only a 7 day hold. He took his last dose on 6/6/25. He wanted to know if he'd be ok to proceed. Told him I will discuss with Dr. Shell, and give him a call back. Patient verbalized understanding and had no further questions.

## 2025-06-10 LAB
ABSOLUTE EOSINOPHIL (OHS): 0.18 K/UL
ABSOLUTE MONOCYTE (OHS): 0.61 K/UL (ref 0.3–1)
ABSOLUTE NEUTROPHIL COUNT (OHS): 3.31 K/UL (ref 1.8–7.7)
BASOPHILS # BLD AUTO: 0.05 K/UL
BASOPHILS NFR BLD AUTO: 0.9 %
ERYTHROCYTE [DISTWIDTH] IN BLOOD BY AUTOMATED COUNT: 13.4 % (ref 11.5–14.5)
HCT VFR BLD AUTO: 42.6 % (ref 40–54)
HGB BLD-MCNC: 14.2 GM/DL (ref 14–18)
IMM GRANULOCYTES # BLD AUTO: 0.01 K/UL (ref 0–0.04)
IMM GRANULOCYTES NFR BLD AUTO: 0.2 % (ref 0–0.5)
LYMPHOCYTES # BLD AUTO: 1.68 K/UL (ref 1–4.8)
MCH RBC QN AUTO: 32.7 PG (ref 27–31)
MCHC RBC AUTO-ENTMCNC: 33.3 G/DL (ref 32–36)
MCV RBC AUTO: 98 FL (ref 82–98)
NUCLEATED RBC (/100WBC) (OHS): 0 /100 WBC
PLATELET # BLD AUTO: 228 K/UL (ref 150–450)
PMV BLD AUTO: 11.6 FL (ref 9.2–12.9)
RBC # BLD AUTO: 4.34 M/UL (ref 4.6–6.2)
RELATIVE EOSINOPHIL (OHS): 3.1 %
RELATIVE LYMPHOCYTE (OHS): 28.8 % (ref 18–48)
RELATIVE MONOCYTE (OHS): 10.4 % (ref 4–15)
RELATIVE NEUTROPHIL (OHS): 56.6 % (ref 38–73)
WBC # BLD AUTO: 5.84 K/UL (ref 3.9–12.7)

## 2025-06-13 ENCOUNTER — TELEPHONE (OUTPATIENT)
Dept: ELECTROPHYSIOLOGY | Facility: CLINIC | Age: 60
End: 2025-06-13
Payer: COMMERCIAL

## 2025-06-13 NOTE — TELEPHONE ENCOUNTER
Spoke to patient    CONFIRMED procedure arrival time of 5:15 am    Reiterated instructions including:    -Directions to check in desk    -NPO after midnight night prior to procedure. Fasting upon arrival to the hospital the day of the procedure. Patient allowed to drink water up until 2 hours prior to arrival due to IV fluid shortage.     -High importance of HOLDING Amiodarone 14 days prior to procedure (last dose 6/6/25; ok to proceed per Dr. Shell); holding Eliquis on day of procedure (last dose on 6/15/25)    - Confirms no new meds prescribed or med changes since scheduling -     -Pre-procedure LABS Reviewed, no alerts noted    -Confirmed absence or presence of implanted device/stimulator n/a    -Confirmed no recent or current fever, cough, or shortness of breath .    -Confirmed no redness, rash, irritation, or yeast infection to skin/ chest / groin area.     Patient verbalized understanding of above, denies any further questions and appreciated the call.

## 2025-06-15 ENCOUNTER — ANESTHESIA EVENT (OUTPATIENT)
Dept: MEDSURG UNIT | Facility: HOSPITAL | Age: 60
End: 2025-06-15
Payer: COMMERCIAL

## 2025-06-15 NOTE — HPI
Jhonatan Sofia is a 60 y.o. male with    History obtained through patient report and clinic notes:   LOV with HELADIO Dc 4/2025:  Mr. Sofia is a 60 y.o. male with HTN, AF/AFL (PVI and CTI-line 1/12/2022, PVI/PWI/CTI/mitral annual AFL RFA 4/2023) here for follow up.     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 He works at [employer] and gets yearly on-site physical exams with electrocardiograms. He reports they have been normal until this past March of 2020 when he was diagnosed with atrial fibrillation. He had no obvious symptoms however his wife and daughter report some increased fatigue that they have noted. He was seen by Dr. Sanchez who referred him to general cardiology. Low dose metoprolol and eliquis were prescribed and he was referred for further evaluation. He has yet to start eliquis.     He exercises regularly with heart rate monitoring and notes at times he sustains his heart rate in the 180s-200 bpm range. He does not use exercise supplements, does not drink alcohol, and wife reports no symptoms consistent with sleep apnea. He had an exercise stress echocardiogram in 2017 which noted no ischemia and showed a structurally normal left ventricle with mild left atrial enlargement (LVEF 55%). A recent echocardiogram noted a LVEF of 50% with a severely enlarged left atrium. A recent TSH lab test indicated a euthyroid state.     Available electrocardiograms in Epic which show normal sinus rhythm until an ECG dated 5/14/2020 which showed atrial fibrillation with controlled ventricular response.     His [id number] score is 1 and we discussed that anticoagulation long-term is a risk-benefit shared decision analysis. I also discussed the goal to reduce symptomatic arrhythmic episodes by pharmacologic and/or procedural methods and utilizing a rhythm versus a rate control strategy. He may have latent symptoms per  his family (fatigue). Discussed regardless at his age and activity level as well as this being the first documented event I strongly recommend an attempt at restoration of sinus rhythm to assess symptomatic response. He understood and agreed. Discussed need to take eliquis prior and 4 weeks after cardioversion. He can then decide on whether he wants to continue it long-term. If needed would use flecainide for rhythm control. Discussed risk atrial fibrillation in high-endurance athletes and advised to limit his exercise effort to maintain acceptable age-related exercise heart rate.     8/6/2020: Cardioversion was successfully performed with restoration of normal sinus rhythm.     Spoke with Mr. Sofia regarding his recurrent AF after his DCCV. He reports he felt great for ~5 days after DCCV and then went back into AF. Discussed starting an anti-arrhythmic drug and then repeating a DCCV. He stated he would like to do that. 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 Will start flecainide 100mg bid Sunday and have my nurse schedule an outpatient DCCV Wednesday or Thursday. He should continue eliquis and metoprolol. He has not missed any eliquis and thus will defer RADHIKA.     10/6/2020: He underwent cardioversion 8/6 with symptom improvement. Continued to have AF afterward and was placed on flecainide. In SR when presented for subsequent DCCV which was aborted. He is here for follow up.   He continues to feel better in SR. Has had 2 breakthrough episodes when he delayed his medications. One lasting several hours. Given his young age and symptomatic breakthrough AF despite flecainide, he would likely be a good candidate for PVI. (UPDATE: Dr. Shell confirms good PVI candidate). Risks, benefits, and alternatives discussed with patient. He is interested in proceeding but cannot fit it into his schedule until after the new year. Will have patient RTC in 3 mo to confirm. In interim, continue  flecainide. He has chronic bradycardia and is asymptomatic, but skips his metoprolol some days if his BP is low. Will reduce to 12.5mg toprol. He remains on eliquis for CVA prophylaxis (CHADSVASc 1).     4/14/2021: Overall he feels he is well controlled on flecainide. Only identifies breakthrough when he misses or delays meds. Does notice fast HR while exercising - likely SR given gradual trend on monitor. He would prefer to continue current regimen for now if it is safe. EKG with stable intervals. Update echo to confirm stability of heart function. Plan for PVI if EF is down or he identifies consistent AF breakthrough despite flecainide. CHADSVASc 1 and he remains on eliquis.     11/29/2021: Pt is out of rhythm today despite flecainide. He has had multiple AF breakthrough episodes. No overt symptoms but he does have a history of fatigue with AF and given his young age, rhythm control is recommended. PVI previously recommended by Dr. Shell. We discussed risks, benefits, and alternatives to PVI. Pt agrees to proceed. In interim will obtain Holter to determine whether his arrhythmia is paroxysmal or persistent. If persistent - will stop flecainide now. CHADSVASC 1 on eliquis.     1/12/2022:  Successful CTI ablation. Successful pulmonary vein RF ablation.     3/16/2022: Pt is 2 mo s/p PVI and CTI-line. He is doing well from a rhythm standpoint, with no documented or symptomatic recurrence of arrhythmia since procedure. He does report more activity tolerance. Never felt palpitations. Feels well.  Reduce flecainide to 50mg BID. CHADSVASc 1 on xarelto.      8/24/2022: He is now 7 months s/p PVI and CTI-line. Continues to feel well, with no documented or symptomatic recurrence of arrhythmia since procedure.  Has restarted exercise without any limitations in activity tolerance. Possible junctional rhythm on EKG reverting to SR on rhythm strip. Asymptomatic.  Will stop flecainide now and recheck EKG in 4 weeks. If WNL, RTC  6 mo. CHADSVASc 1 on xarelto. We discussed AF monitoring at home.     1/26/2023: He is in an atypical AFL, appears rate controlled. He desires redo ablation. Recommend continuing with extended holter for now to ensure he is rate controlled     2/10/2023 holter monitor: Paroxysmal atrial fibrillation and flutter. RVR most noted with atrial flutter. AF burden ~78%. He elected for redo-ablation.     4/28/2023: Mitral annular flutter diagnosed and underwent lateral mitral isthmus ablation with bidirectional block, repeat ablation of CTI for loss of CTI block, re-isolation of RSPV/RIPV and LA posterior wall isolation.     6/20/2023: Mr. Sofia returns for post-ablation follow-up. He feels great. BP not elevated at home. Looking back he rarely has BP readings that would be considered HTN, mostly 110s-120s/80s. He is on 25mg of metoprolol.   In clinic ECG notes sinus bradycardia with narrow QRS. Rate 49 bpm.  Mr. Sofia is a pleasant 58 year-old man with paroxysmal AF, typical AFL, and atypical AFL s/p PVI/CTI ablation 1/2022 with recurrence and then s/p redo-PVI/mitral isthmus ablation/LA posterior wall isolation/repeat CTI ablation 4/2023. He has remained in sinus rhythm. Looking at all of his BP readings I question HTN diagnosis. He measures his BP at home and he is 110s-120s/80s. Very low dose metoprolol is unlikely to create these numbers. [id number] is essentially 0. Would stop metoprolol/xarelto. He will continue to monitor his BP at home and if he starts to consistently get readings in the HTN range then would resume therapy and discuss resumption of xarelto.     04/29/2025:  -3/26/2025: Last week, he had an EKG done at his job which showed he was back in AF. He was asymptomatic. Eliquis was resumed.  Today, here for RADHIKA/DCCV. No acute complaints.   -3/31/2025: Patient was admitted for AFib, EP consulted, started on amiodarone and underwent cardioversion to sinus rhythm.  During hospital  l stay was  asymptomatic and stay was unremarkable, of note he was recently admitted and discharged for similar issues. On this occurrence,  he was discharged on p.o. amiodarone per EP recommendation, metoprolol  and to continue his apixaban.    -4/23/2025: Pt instructed to reduce the Amiodarone to 100 mg ( 1/2 tablet) once a day and stop metoprolol.    Pt is s/p recurrence of AF identified during employee physical last month. Underwent DCCV 3/26/2025. AF recurred within a week and he underwent another DCCV+amio load 3/31/2025. RADHIKA 3/26 showed decrease in EF to 25-30%, likely secondary to AF, echo 5/2021 showed normal LVEF. He reports his breathing significantly improved since second DCCV although he does have more night wakening. BPs increased and he became very jody after amio load but rates have already improved after amio decreased to 100g daily last week. Toprol currently held secondary to bradycardia. BPs slowly improving.  Ablation scheduled 6/16/2025. All questions answered.      Jhonatan Sofia presents today to [facility] for scheduled PFA PVI with Dr. Shell. He denies any chest pain, palpitations, SOB, JACOME, dizziness, light headedness, weakness, syncope, or near syncopal episodes. He denies any bleeding, infections, fevers, rashes, or surgeries in the past 30 days. He is currently taking Amiodarone with last dose 6/6/25; ok to proceed per Dr. Shell. And [place] with last dose on 6/15/25 pm.  Preop labs from 6/9/25 without significant alerts.    RADHIKA (3/26/2025):    RADHIKA performed prior to cardioversion. No LA/SURINDER thrombus.    Left Ventricle: The left ventricle is mildly dilated. Severe global hypokinesis present. There is severely reduced systolic function with a visually estimated ejection fraction of 25 - 30%. Ejection fraction is approximately 25%.    ECG today shows SB at 50 bpm  ms QRS 90 ms QT/Qtc 440/401 ms.     Plan:  -PFA PVI  -RADHIKA prior to rule out thrombus, cx if SR and compliance with OAC per  MD  -Anesthesia for sedation    We discussed risks and benefits at length. Our discussion included, but was not limited to the risk of death, infection, bleeding, stroke, MI, cardiac perforation, embolism, cardiac tamponade, vascular injury, valvular injury, rhabdomyolysis, hemolysis and other organic injury including the possibility for need for surgery or pacemaker implantation. Jhonatan Sofia understood and elected to proceed. All questions answered. Consent signed.    Attending: Scotty Shell MD

## 2025-06-15 NOTE — ANESTHESIA PREPROCEDURE EVALUATION
06/15/2025  Jhonatan Sofia is a 60 y.o., male.      Pre-op Assessment    I have reviewed the Patient Summary Reports.       I have reviewed the Medications.     Review of Systems  Anesthesia Hx:  No problems with previous Anesthesia               Denies Personal Hx of Anesthesia complications.                    Cardiovascular:     Hypertension                                    Hypertension     Atrial Fibrillation     Renal/:  Chronic Renal Disease        Kidney Function/Disease                 Physical Exam    Airway:  No airway management difficulties anticipated  Dental:  No active dental issues noted  Chest/Lungs:  Clear to auscultation    Heart:  Rate: Normal  Rhythm: Regular Rhythm  Sounds: Normal        Anesthesia Plan  Type of Anesthesia, risks & benefits discussed:    Anesthesia Type: Gen ETT  Intra-op Monitoring Plan: Art Line  Informed Consent: Informed consent signed with the Patient and all parties understand the risks and agree with anesthesia plan.  All questions answered.   ASA Score: 3  Anesthesia Plan Notes: Chart reviewed. Patient seen and examined. Anesthesia plan discussed and questions answered. E-consent signed. Satnam Sheldon MD    Ready For Surgery From Anesthesia Perspective.     .

## 2025-06-16 ENCOUNTER — ANESTHESIA (OUTPATIENT)
Dept: MEDSURG UNIT | Facility: HOSPITAL | Age: 60
End: 2025-06-16
Payer: COMMERCIAL

## 2025-06-16 ENCOUNTER — HOSPITAL ENCOUNTER (OUTPATIENT)
Facility: HOSPITAL | Age: 60
Discharge: HOME | End: 2025-06-16
Attending: INTERNAL MEDICINE | Admitting: INTERNAL MEDICINE
Payer: COMMERCIAL

## 2025-06-16 VITALS
WEIGHT: 225 LBS | HEIGHT: 72 IN | BODY MASS INDEX: 30.48 KG/M2 | RESPIRATION RATE: 14 BRPM | TEMPERATURE: 98 F | DIASTOLIC BLOOD PRESSURE: 85 MMHG | SYSTOLIC BLOOD PRESSURE: 144 MMHG | HEART RATE: 61 BPM | OXYGEN SATURATION: 99 %

## 2025-06-16 DIAGNOSIS — I48.91 ATRIAL FIBRILLATION: ICD-10-CM

## 2025-06-16 DIAGNOSIS — I48.0 PAROXYSMAL ATRIAL FIBRILLATION: Primary | ICD-10-CM

## 2025-06-16 DIAGNOSIS — I49.9 ARRHYTHMIA: ICD-10-CM

## 2025-06-16 DIAGNOSIS — I48.0 AF (PAROXYSMAL ATRIAL FIBRILLATION): ICD-10-CM

## 2025-06-16 LAB
OHS QRS DURATION: 90 MS
OHS QRS DURATION: 92 MS
OHS QTC CALCULATION: 401 MS
OHS QTC CALCULATION: 448 MS

## 2025-06-16 PROCEDURE — C1730 CATH, EP, 19 OR FEW ELECT: HCPCS | Performed by: INTERNAL MEDICINE

## 2025-06-16 PROCEDURE — 27201423 OPTIME MED/SURG SUP & DEVICES STERILE SUPPLY: Performed by: INTERNAL MEDICINE

## 2025-06-16 PROCEDURE — 93010 ELECTROCARDIOGRAM REPORT: CPT | Mod: 76,,, | Performed by: INTERNAL MEDICINE

## 2025-06-16 PROCEDURE — C1769 GUIDE WIRE: HCPCS | Performed by: INTERNAL MEDICINE

## 2025-06-16 PROCEDURE — 93656 COMPRE EP EVAL ABLTJ ATR FIB: CPT | Performed by: INTERNAL MEDICINE

## 2025-06-16 PROCEDURE — 37000009 HC ANESTHESIA EA ADD 15 MINS: Performed by: INTERNAL MEDICINE

## 2025-06-16 PROCEDURE — 93656 COMPRE EP EVAL ABLTJ ATR FIB: CPT | Mod: ,,, | Performed by: INTERNAL MEDICINE

## 2025-06-16 PROCEDURE — 37000008 HC ANESTHESIA 1ST 15 MINUTES: Performed by: INTERNAL MEDICINE

## 2025-06-16 PROCEDURE — C1753 CATH, INTRAVAS ULTRASOUND: HCPCS | Performed by: INTERNAL MEDICINE

## 2025-06-16 PROCEDURE — 93657 TX L/R ATRIAL FIB ADDL: CPT | Performed by: INTERNAL MEDICINE

## 2025-06-16 PROCEDURE — 63600175 PHARM REV CODE 636 W HCPCS: Performed by: NURSE ANESTHETIST, CERTIFIED REGISTERED

## 2025-06-16 PROCEDURE — C1894 INTRO/SHEATH, NON-LASER: HCPCS | Performed by: INTERNAL MEDICINE

## 2025-06-16 PROCEDURE — 93005 ELECTROCARDIOGRAM TRACING: CPT

## 2025-06-16 PROCEDURE — C1766 INTRO/SHEATH,STRBLE,NON-PEEL: HCPCS | Performed by: INTERNAL MEDICINE

## 2025-06-16 PROCEDURE — 25000003 PHARM REV CODE 250: Performed by: NURSE ANESTHETIST, CERTIFIED REGISTERED

## 2025-06-16 PROCEDURE — 27201037 HC PRESSURE MONITORING SET UP

## 2025-06-16 PROCEDURE — C1760 CLOSURE DEV, VASC: HCPCS | Performed by: INTERNAL MEDICINE

## 2025-06-16 PROCEDURE — 63600175 PHARM REV CODE 636 W HCPCS: Performed by: INTERNAL MEDICINE

## 2025-06-16 PROCEDURE — 93657 TX L/R ATRIAL FIB ADDL: CPT | Mod: ,,, | Performed by: INTERNAL MEDICINE

## 2025-06-16 PROCEDURE — C1733 CATH, EP, OTHR THAN COOL-TIP: HCPCS | Performed by: INTERNAL MEDICINE

## 2025-06-16 PROCEDURE — C1887 CATHETER, GUIDING: HCPCS | Performed by: INTERNAL MEDICINE

## 2025-06-16 PROCEDURE — 25000003 PHARM REV CODE 250: Performed by: INTERNAL MEDICINE

## 2025-06-16 DEVICE — SYS CLSR VASCADE MVP ID6-12FR: Type: IMPLANTABLE DEVICE | Site: FEMORAL | Status: FUNCTIONAL

## 2025-06-16 RX ORDER — ONDANSETRON HYDROCHLORIDE 2 MG/ML
4 INJECTION, SOLUTION INTRAVENOUS ONCE AS NEEDED
Status: DISCONTINUED | OUTPATIENT
Start: 2025-06-16 | End: 2025-06-16 | Stop reason: HOSPADM

## 2025-06-16 RX ORDER — HYDROMORPHONE HYDROCHLORIDE 1 MG/ML
0.2 INJECTION, SOLUTION INTRAMUSCULAR; INTRAVENOUS; SUBCUTANEOUS EVERY 5 MIN PRN
Status: DISCONTINUED | OUTPATIENT
Start: 2025-06-16 | End: 2025-06-16 | Stop reason: HOSPADM

## 2025-06-16 RX ORDER — PROTAMINE SULFATE 10 MG/ML
INJECTION, SOLUTION INTRAVENOUS
Status: DISCONTINUED | OUTPATIENT
Start: 2025-06-16 | End: 2025-06-16

## 2025-06-16 RX ORDER — DEXAMETHASONE SODIUM PHOSPHATE 4 MG/ML
INJECTION, SOLUTION INTRA-ARTICULAR; INTRALESIONAL; INTRAMUSCULAR; INTRAVENOUS; SOFT TISSUE
Status: DISCONTINUED | OUTPATIENT
Start: 2025-06-16 | End: 2025-06-16

## 2025-06-16 RX ORDER — FENTANYL CITRATE 50 UG/ML
25 INJECTION, SOLUTION INTRAMUSCULAR; INTRAVENOUS EVERY 5 MIN PRN
Status: DISCONTINUED | OUTPATIENT
Start: 2025-06-16 | End: 2025-06-16 | Stop reason: HOSPADM

## 2025-06-16 RX ORDER — ACETAMINOPHEN 325 MG/1
650 TABLET ORAL EVERY 4 HOURS PRN
Status: DISCONTINUED | OUTPATIENT
Start: 2025-06-16 | End: 2025-06-16 | Stop reason: HOSPADM

## 2025-06-16 RX ORDER — LIDOCAINE HYDROCHLORIDE 20 MG/ML
INJECTION, SOLUTION INFILTRATION; PERINEURAL
Status: DISCONTINUED | OUTPATIENT
Start: 2025-06-16 | End: 2025-06-16 | Stop reason: HOSPADM

## 2025-06-16 RX ORDER — PROPOFOL 10 MG/ML
VIAL (ML) INTRAVENOUS
Status: DISCONTINUED | OUTPATIENT
Start: 2025-06-16 | End: 2025-06-16

## 2025-06-16 RX ORDER — HEPARIN SODIUM 1000 [USP'U]/ML
INJECTION, SOLUTION INTRAVENOUS; SUBCUTANEOUS
Status: DISCONTINUED | OUTPATIENT
Start: 2025-06-16 | End: 2025-06-16

## 2025-06-16 RX ORDER — FENTANYL CITRATE 50 UG/ML
INJECTION, SOLUTION INTRAMUSCULAR; INTRAVENOUS
Status: DISCONTINUED | OUTPATIENT
Start: 2025-06-16 | End: 2025-06-16

## 2025-06-16 RX ORDER — MIDAZOLAM HYDROCHLORIDE 1 MG/ML
INJECTION INTRAMUSCULAR; INTRAVENOUS
Status: DISCONTINUED | OUTPATIENT
Start: 2025-06-16 | End: 2025-06-16

## 2025-06-16 RX ORDER — LIDOCAINE HYDROCHLORIDE 20 MG/ML
INJECTION INTRAVENOUS
Status: DISCONTINUED | OUTPATIENT
Start: 2025-06-16 | End: 2025-06-16

## 2025-06-16 RX ORDER — ONDANSETRON HYDROCHLORIDE 2 MG/ML
INJECTION, SOLUTION INTRAVENOUS
Status: DISCONTINUED | OUTPATIENT
Start: 2025-06-16 | End: 2025-06-16

## 2025-06-16 RX ORDER — HEPARIN SOD,PORCINE/0.9 % NACL 1000/500ML
INTRAVENOUS SOLUTION INTRAVENOUS
Status: DISCONTINUED | OUTPATIENT
Start: 2025-06-16 | End: 2025-06-16 | Stop reason: HOSPADM

## 2025-06-16 RX ORDER — PROCHLORPERAZINE EDISYLATE 5 MG/ML
5 INJECTION INTRAMUSCULAR; INTRAVENOUS EVERY 30 MIN PRN
Status: DISCONTINUED | OUTPATIENT
Start: 2025-06-16 | End: 2025-06-16 | Stop reason: HOSPADM

## 2025-06-16 RX ORDER — ROCURONIUM BROMIDE 10 MG/ML
INJECTION, SOLUTION INTRAVENOUS
Status: DISCONTINUED | OUTPATIENT
Start: 2025-06-16 | End: 2025-06-16

## 2025-06-16 RX ORDER — DIPHENHYDRAMINE HYDROCHLORIDE 50 MG/ML
25 INJECTION, SOLUTION INTRAMUSCULAR; INTRAVENOUS EVERY 6 HOURS PRN
Status: DISCONTINUED | OUTPATIENT
Start: 2025-06-16 | End: 2025-06-16 | Stop reason: HOSPADM

## 2025-06-16 RX ORDER — ATROPINE SULFATE 0.4 MG/ML
INJECTION, SOLUTION ENDOTRACHEAL; INTRAMEDULLARY; INTRAMUSCULAR; INTRAVENOUS; SUBCUTANEOUS
Status: DISCONTINUED | OUTPATIENT
Start: 2025-06-16 | End: 2025-06-16

## 2025-06-16 RX ORDER — HEPARIN SODIUM 10000 [USP'U]/100ML
INJECTION, SOLUTION INTRAVENOUS CONTINUOUS PRN
Status: DISCONTINUED | OUTPATIENT
Start: 2025-06-16 | End: 2025-06-16

## 2025-06-16 RX ADMIN — HEPARIN SODIUM 3000 UNITS: 1000 INJECTION, SOLUTION INTRAVENOUS; SUBCUTANEOUS at 08:06

## 2025-06-16 RX ADMIN — HEPARIN SODIUM AND DEXTROSE 12 UNITS/KG/HR: 10000; 5 INJECTION INTRAVENOUS at 08:06

## 2025-06-16 RX ADMIN — FENTANYL CITRATE 25 MCG: 50 INJECTION, SOLUTION INTRAMUSCULAR; INTRAVENOUS at 09:06

## 2025-06-16 RX ADMIN — DEXAMETHASONE SODIUM PHOSPHATE 4 MG: 4 INJECTION, SOLUTION INTRAMUSCULAR; INTRAVENOUS at 09:06

## 2025-06-16 RX ADMIN — PROTAMINE SULFATE 1 MG: 10 INJECTION, SOLUTION INTRAVENOUS at 09:06

## 2025-06-16 RX ADMIN — PROPOFOL 50 MG: 10 INJECTION, EMULSION INTRAVENOUS at 07:06

## 2025-06-16 RX ADMIN — ROCURONIUM BROMIDE 30 MG: 10 INJECTION INTRAVENOUS at 08:06

## 2025-06-16 RX ADMIN — PROPOFOL 100 MG: 10 INJECTION, EMULSION INTRAVENOUS at 07:06

## 2025-06-16 RX ADMIN — FENTANYL CITRATE 50 MCG: 50 INJECTION, SOLUTION INTRAMUSCULAR; INTRAVENOUS at 07:06

## 2025-06-16 RX ADMIN — ATROPINE SULFATE 0.5 MG: 0.4 INJECTION, SOLUTION INTRAVENOUS at 08:06

## 2025-06-16 RX ADMIN — ONDANSETRON 4 MG: 2 INJECTION INTRAMUSCULAR; INTRAVENOUS at 09:06

## 2025-06-16 RX ADMIN — MIDAZOLAM HYDROCHLORIDE 2 MG: 2 INJECTION, SOLUTION INTRAMUSCULAR; INTRAVENOUS at 07:06

## 2025-06-16 RX ADMIN — HEPARIN SODIUM 20000 UNITS: 1000 INJECTION, SOLUTION INTRAVENOUS; SUBCUTANEOUS at 08:06

## 2025-06-16 RX ADMIN — ROCURONIUM BROMIDE 100 MG: 10 INJECTION INTRAVENOUS at 07:06

## 2025-06-16 RX ADMIN — LIDOCAINE HYDROCHLORIDE 50 MG: 20 INJECTION INTRAVENOUS at 07:06

## 2025-06-16 RX ADMIN — SODIUM CHLORIDE: 0.9 INJECTION, SOLUTION INTRAVENOUS at 07:06

## 2025-06-16 RX ADMIN — PROTAMINE SULFATE 99 MG: 10 INJECTION, SOLUTION INTRAVENOUS at 09:06

## 2025-06-16 NOTE — HOSPITAL COURSE
Patient underwent PFA-PVI (see procedure note for details), tolerated procedure well with no acute complications. Admitted to PACU, post-procedure ECG showed SR at 63 bpm  ms QRS 92 ms QT/QTc 438/448 ms. Bilateral groin sites with sutures removed, dressings C/D/I. No bleeding, hematoma, or bruit noted. Bedrest completed x 4 hours. He was monitored on telemetry, no acute arrhythmias.   Patient ambulating, tolerating PO intake, and voiding with no issues. Denies any chest pain or SOB. Discharge plans discussed with Dr. Shell. Patient to continue all home medications including AAD amiodarone and eliquis for CVA prophylaxis. Follow up with Dr. Shell in 3 months. He was assessed at bedside prior to discharge. He reported feeling well and denied chest discomfort, shortness of breath, palpitations, lightheadedness, or any other acute symptoms. Discharge plans/instructions discussed with patient and family who verbalized understanding and agreement of plans of care. No further questions or concerns voiced at this time. Pt deemed medically stable for discharge to home.    Physical Exam:   Gen: No acute distress, pleasant patient answering questions appropriately  CVS: Regular rate and rhythm  CHEST: Breathing even and unlabored  ABD: Soft, non-tender, non distended  GROINS:Bilateral groin sites soft, non tender, no bleeding, no swelling, no hematoma. Bilateral dressing CDI  Neurologic: Normal strength and tone. No focal numbness or weakness.   Psychiatric: Normal mood and affect. Behavior is normal. Alert and oriented X 3.  EXT: No Edema        Plan:  - Follow up with Dr. Shell in 3 months  - Continue amiodarone  AAD tonight  - Continue eliquis OAC tonight  - Discharge instructions reviewed  - Notify EP with any issues     Attending: Scotty Shell MD

## 2025-06-16 NOTE — DISCHARGE SUMMARY
[facility] - Short Stay Cardiac Unit  Cardiology  Discharge Summary      Patient Name: Jhonatan Sofia  MRN: [id number]  Admission Date: 6/16/2025  Hospital Length of Stay: 0 days  Discharge Date and Time: 06/16/2025   Attending Physician: Scotty Shell MD    Discharging Provider: Lilly Jrarett DNP  Primary Care Physician: Jose Sanchez MD    HPI:   Jhonatan Sofia is a 60 y.o. male with HTN, AF/AFL (PVI and CTI-line 1/12/2022, PVI/PWI/CTI/mitral annual AFL RFA 4/2023)       Jhonatan Sofia presents today to [facility] for scheduled PFA PVI with Dr. Shell. He denies any chest pain, palpitations, SOB, JACOME, dizziness, light headedness, weakness, syncope, or near syncopal episodes. He denies any bleeding, infections, fevers, rashes, or surgeries in the past 30 days. He is currently taking Amiodarone with last dose 6/6/25; ok to proceed per Dr. Shell. And Eliquis with last dose on 6/15/25 pm.  Preop labs from 6/9/25 without significant alerts.    RADHIKA (3/26/2025):    RADHIKA performed prior to cardioversion. No LA/SURINDER thrombus.    Left Ventricle: The left ventricle is mildly dilated. Severe global hypokinesis present. There is severely reduced systolic function with a visually estimated ejection fraction of 25 - 30%. Ejection fraction is approximately 25%.    ECG today shows SB at 50 bpm  ms QRS 90 ms QT/Qtc 440/401 ms.     Procedure(s) (LRB):  Ablation atrial fibrillation (N/A)     Hospital Course:  Patient underwent PFA-PVI (see procedure note for details), tolerated procedure well with no acute complications. Admitted to PACU, post-procedure ECG showed SR at 63 bpm  ms QRS 92 ms QT/QTc 438/448 ms. Bilateral groin sites with sutures removed, dressings C/D/I. No bleeding, hematoma, or bruit noted. Bedrest completed x 4 hours. He was monitored on telemetry, no acute arrhythmias.   Patient ambulating, tolerating PO intake, and voiding with no issues. Denies any chest pain or SOB. Discharge plans  discussed with Dr. Shell. Patient to continue all home medications including AAD amiodarone and eliquis for CVA prophylaxis. Follow up with Dr. Shell in 3 months. He was assessed at bedside prior to discharge. He reported feeling well and denied chest discomfort, shortness of breath, palpitations, lightheadedness, or any other acute symptoms. Discharge plans/instructions discussed with patient and family who verbalized understanding and agreement of plans of care. No further questions or concerns voiced at this time. Pt deemed medically stable for discharge to home.    Physical Exam:   Gen: No acute distress, pleasant patient answering questions appropriately  CVS: Regular rate and rhythm  CHEST: Breathing even and unlabored  ABD: Soft, non-tender, non distended  GROINS:Bilateral groin sites soft, non tender, no bleeding, no swelling, no hematoma. Bilateral dressing CDI  Neurologic: Normal strength and tone. No focal numbness or weakness.   Psychiatric: Normal mood and affect. Behavior is normal. Alert and oriented X 3.  EXT: No Edema      Final Active Diagnoses:    Diagnosis Date Noted POA    PRINCIPAL PROBLEM:  Paroxysmal atrial fibrillation [I48.0] 07/07/2020 Yes      Problems Resolved During this Admission:     No new Assessment & Plan notes have been filed under this hospital service since the last note was generated.  Service: Cardiology      Discharged Condition: stable    Disposition: Home or Self Care    Follow Up:   Follow-up Information       Scotty Shell MD Follow up in 3 month(s).    Specialties: Electrophysiology, Cardiology  Why: routine follow up  Contact information:  [address] [facility] [address]  [phone]                           Patient Instructions:      Other restrictions (specify):   Order Comments: Ablation Discharge Instructions and Care of Your Groin      Follow up:  · Follow up with Dr. Shell in 3 months.    Medications:  · Make sure to continue taking your blood  "thinner Eliquis (apixaban) after your procedure as prescribed  · You may experience chest discomfort (also known as "pericarditis") with deep breathes, coughing, and/or laying down which is typically normal following your procedure. If this occurs, you can take ibuprofen (Motrin) 800 mg every 8 hours for 2-3 days. If the chest pain is persistent or severe please visit the nearest emergency department.    Groin site management, precautions, and restrictions:  · Remove the bandages over your groin area the morning after your procedure. You can shower after you remove these bandages. Wash the sites at least once daily with soap and water. Keep the groin sites clean and dry. You do not need to apply ointments or bandages to the area.   · Wear loose clothes and loose underwear.  · Do not take a bath or submerge your groin area (for example: Jacuzzi, swimming pool, or lake) or at least 1 week or when the puncture sites in your groin have completely healed.     If bleeding should occur at the groin sites following discharge:  · If oozing from groin site occurs, lay down and apply pressure to the puncture site with your fingers without letting up for 15 minutes and lay flat for 1 hour. If bleeding has resolved, you can continue to monitor. If the bleeding continues or there is significant swelling or pain in the groin area, please call 911 immediately and visit the nearest ER for evaluation and treatment. Do not drive yourself to the hospital. DO NOT STOP TAKING YOUR BLOOD THINNER UNLESS INSTRUCTED BY A PHYSICIAN.     Activity Instructions:  · Do not drive or operate any dangerous machinery for 24 hours, but optimally 48-72 hours since you were given general anesthesia.   · Do not strain during bowel movements for the first 3 to 4 days after the procedure to prevent bleeding from the groin sites.  · Avoid heavy lifting (more than 10 pounds) and pushing or pulling heavy objects for the first 5 to 7 days after the " procedure.  · Do not participate in strenuous activities for 5 days after the procedure. This includes most sports - jogging, golfing, play tennis, and bowling.  · You may climb stairs if needed, but walk up and down the stairs more slowly than usual.  · Gradually increase your activities until you reach your normal activity level within one week after the procedure.    Go to the Emergency Department if you develop:   · Change in color or temperature of the leg  · Redness, warmth, or drainage/pus at the groin sites  · Chills or fever greater than 101.0 F   · Severe bleeding or swelling at the groin sites  · Acute Weakness or numbness   · Visual, gait or speech disturbances   · New chest pain, palpitations, shortness of breath, fainting     No driving until:   Order Comments: 48 hours, due to receiving sedation     Notify your health care provider if you experience any of the following:  increased confusion or weakness     Notify your health care provider if you experience any of the following:  persistent dizziness, light-headedness, or visual disturbances     Notify your health care provider if you experience any of the following:  worsening rash     Notify your health care provider if you experience any of the following:  severe persistent headache     Notify your health care provider if you experience any of the following:  difficulty breathing or increased cough     Notify your health care provider if you experience any of the following:  redness, tenderness, or signs of infection (pain, swelling, redness, odor or green/yellow discharge around incision site)     Notify your health care provider if you experience any of the following:  severe uncontrolled pain     Notify your health care provider if you experience any of the following:  persistent nausea and vomiting or diarrhea     Notify your health care provider if you experience any of the following:  temperature >100.4     Remove dressing in 24 hours      Shower on day dressing removed (No bath)     Medications:  Reconciled Home Medications:      Medication List        CHANGE how you take these medications      amiodarone 200 MG Tab  Commonly known as: PACERONE  Take 2 tablets (400 mg total) by mouth 2 (two) times a day for 12 days, THEN 1 tablet (200 mg total) once daily.  Start taking on: April 12, 2025  What changed: See the new instructions. 100mg daily (1/2 tablet)            CONTINUE taking these medications      apixaban 5 mg Tab  Commonly known as: ELIQUIS  Take 1 tablet (5 mg total) by mouth 2 (two) times daily.            Stop these medications      metoprolol tartrate 25 MG tablet  Commonly known as: LOPRESSOR  Take 1 tablet (25 mg total) by mouth 2 (two) times daily.    Already stopped prior to procedure          Plan:  - Follow up with Dr. Shell in 3 months  - Continue amiodarone  AAD tonight  - Continue eliquis OAC tonight  - Discharge instructions reviewed  - Notify EP with any issues     Time spent on the discharge of patient: 25 minutes    Lilly Jarrett DNP  Cardiology  [facility] - Short Stay Cardiac Unit    Attending: Scotty Shell MD

## 2025-06-16 NOTE — PLAN OF CARE
Patient received back to [facility] room 11 post procedure. Report received from SREEKANTH Lucio. Patient is oriented x3, sleepy, but arousable to voice. VSS. Bedrest instructions reviewed with patient. All questions answered. Right groin with dry gauze/tegaderm dressing intact. Left groin with suture/stopcock. no bleeding noted. No hematoma. + pedal pulses palpable. Spouse notified and to bedside. Call light placed within reach.

## 2025-06-16 NOTE — TRANSFER OF CARE
Anesthesia Transfer of Care Note    Patient: Jhonatan Sofia    Procedure(s) Performed: Procedure(s) (LRB):  Ablation atrial fibrillation (N/A)    Patient location: PACU    Anesthesia Type: general    Transport from OR: Transported from OR on room air with adequate spontaneous ventilation    Post pain: adequate analgesia    Post assessment: no apparent anesthetic complications    Post vital signs: stable    Level of consciousness: awake    Nausea/Vomiting: no nausea/vomiting    Complications: none    Transfer of care protocol was followed      Last vitals: Visit Vitals  BP (!) 147/95 (BP Location: Right arm, Patient Position: Lying)   Pulse (!) 54   Temp 36.7 °C (98.1 °F) (Temporal)   Resp 18   Ht 6' (1.829 m)   Wt 102.1 kg (225 lb)   SpO2 96%   BMI 30.52 kg/m²

## 2025-06-16 NOTE — ANESTHESIA PROCEDURE NOTES
Intubation    Date/Time: 6/16/2025 7:37 AM    Performed by: Elidia Bartholomew CRNA  Authorized by: Elidia Bartholomew CRNA    Intubation:     Induction:  Intravenous    Intubated:  Postinduction    Mask Ventilation:  Easy mask    Attempts:  1    Attempted By:  CRNA    Method of Intubation:  Video laryngoscopy    Blade:  Townsend 3    Laryngeal View Grade: Grade I - full view of cords      Difficult Airway Encountered?: No      Complications:  None    Airway Device:  Oral endotracheal tube    Airway Device Size:  7.5    Style/Cuff Inflation:  Cuffed    Inflation Amount (mL):  8    Tube secured:  21    Secured at:  The lips    Placement Verified By:  Capnometry    Complicating Factors:  None    Findings Post-Intubation:  BS equal bilateral

## 2025-06-16 NOTE — ANESTHESIA POSTPROCEDURE EVALUATION
Anesthesia Post Evaluation    Patient: Jhonatan Sofia    Procedure(s) Performed: Procedure(s) (LRB):  Ablation atrial fibrillation (N/A)    Final Anesthesia Type: general      Patient participation: Yes- Able to Participate  Level of consciousness: awake and alert  Post-procedure vital signs: reviewed and stable  Pain control: Pain has been treated.  Airway patency: patent    PONV status: Absent or treated.  Anesthetic complications: no      Cardiovascular status: hemodynamically stable  Respiratory status: unassisted  Hydration status: euvolemic  Follow-up not needed.              Vitals Value Taken Time   /90 06/16/25 10:46   Temp 36.6 °C (97.9 °F) 06/16/25 10:30   Pulse 55 06/16/25 10:47   Resp 14 06/16/25 10:47   SpO2 97 % 06/16/25 10:47   Vitals shown include unfiled device data.      No case tracking events are documented in the log.      Pain/Alex Score: Pain Rating Prior to Med Admin: 0 (6/16/2025 10:30 AM)  Alex Score: 8 (6/16/2025 10:30 AM)

## 2025-06-16 NOTE — H&P
[facility] Short Stay Cardiac Unit  Cardiology  History and Physical     Patient Name: Jhonatna Sofia  MRN: [id number]  Admission Date: 6/16/2025  Code Status: Prior   Attending Provider: Scotty Shell MD   Primary Care Physician: Jose Sanchez MD  Principal Problem:Paroxysmal atrial fibrillation    Patient information was obtained from patient and past medical records.     Subjective:     Chief Complaint:  Paroxysmal atrial fibrillation     HPI:  Jhonatan Sofia is a 60 y.o. 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 He works at [employer] and gets yearly on-site physical exams with electrocardiograms. He reports they have been normal until this past March of 2020 when he was diagnosed with atrial fibrillation. He had no obvious symptoms however his wife and daughter report some increased fatigue that they have noted. He was seen by Dr. Sanchez who referred him to general cardiology. Low dose metoprolol and eliquis were prescribed and he was referred for further evaluation. He has yet to start eliquis.     He exercises regularly with heart rate monitoring and notes at times he sustains his heart rate in the 180s-200 bpm range. He does not use exercise supplements, does not drink alcohol, and wife reports no symptoms consistent with sleep apnea. He had an exercise stress echocardiogram in 2017 which noted no ischemia and showed a structurally normal left ventricle with mild left atrial enlargement (LVEF 55%). A recent echocardiogram noted a LVEF of 50% with a severely enlarged left atrium. A recent TSH lab test indicated a euthyroid state.     Available electrocardiograms in Epic which show normal sinus rhythm until an ECG dated  5/14/2020 which showed atrial fibrillation with controlled ventricular response.     His [id number] score is 1 and we discussed that anticoagulation long-term is a risk-benefit shared decision analysis. I also discussed the goal to reduce symptomatic arrhythmic episodes by pharmacologic and/or procedural methods and utilizing a rhythm versus a rate control strategy. He may have latent symptoms per his family (fatigue). Discussed regardless at his age and activity level as well as this being the first documented event I strongly recommend an attempt at restoration of sinus rhythm to assess symptomatic response. He understood and agreed. Discussed need to take eliquis prior and 4 weeks after cardioversion. He can then decide on whether he wants to continue it long-term. If needed would use flecainide for rhythm control. Discussed risk atrial fibrillation in high-endurance athletes and advised to limit his exercise effort to maintain acceptable age-related exercise heart rate.     8/6/2020: Cardioversion was successfully performed with restoration of normal sinus rhythm.     Spoke with Mr. Sofia regarding his recurrent AF after his DCCV. He reports he felt great for ~5 days after DCCV and then went back into AF. Discussed starting an anti-arrhythmic drug and then repeating a DCCV. He stated he would like to do that. 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 Will start flecainide 100mg bid Sunday and have my nurse schedule an outpatient DCCV Wednesday or Thursday. He should continue eliquis and metoprolol. He has not missed any eliquis and thus will defer RADHIKA.     10/6/2020: He underwent cardioversion 8/6 with symptom improvement. Continued to have AF afterward and was placed on flecainide. In SR when presented for subsequent DCCV which was aborted. He is here for follow up.   He continues to feel better in SR. Has had 2 breakthrough episodes when he delayed his medications. One lasting  several hours. Given his young age and symptomatic breakthrough AF despite flecainide, he would likely be a good candidate for PVI. (UPDATE: Dr. Shell confirms good PVI candidate). Risks, benefits, and alternatives discussed with patient. He is interested in proceeding but cannot fit it into his schedule until after the new year. Will have patient RTC in 3 mo to confirm. In interim, continue flecainide. He has chronic bradycardia and is asymptomatic, but skips his metoprolol some days if his BP is low. Will reduce to 12.5mg toprol. He remains on eliquis for CVA prophylaxis (CHADSVASc 1).     4/14/2021: Overall he feels he is well controlled on flecainide. Only identifies breakthrough when he misses or delays meds. Does notice fast HR while exercising - likely SR given gradual trend on monitor. He would prefer to continue current regimen for now if it is safe. EKG with stable intervals. Update echo to confirm stability of heart function. Plan for PVI if EF is down or he identifies consistent AF breakthrough despite flecainide. CHADSVASc 1 and he remains on eliquis.     11/29/2021: Pt is out of rhythm today despite flecainide. He has had multiple AF breakthrough episodes. No overt symptoms but he does have a history of fatigue with AF and given his young age, rhythm control is recommended. PVI previously recommended by Dr. Shell. We discussed risks, benefits, and alternatives to PVI. Pt agrees to proceed. In interim will obtain Holter to determine whether his arrhythmia is paroxysmal or persistent. If persistent - will stop flecainide now. CHADSVASC 1 on eliquis.     1/12/2022:  Successful CTI ablation. Successful pulmonary vein RF ablation.     3/16/2022: Pt is 2 mo s/p PVI and CTI-line. He is doing well from a rhythm standpoint, with no documented or symptomatic recurrence of arrhythmia since procedure. He does report more activity tolerance. Never felt palpitations. Feels well.  Reduce flecainide to 50mg BID.  CHADSVASc 1 on xarelto.      8/24/2022: He is now 7 months s/p PVI and CTI-line. Continues to feel well, with no documented or symptomatic recurrence of arrhythmia since procedure.  Has restarted exercise without any limitations in activity tolerance. Possible junctional rhythm on EKG reverting to SR on rhythm strip. Asymptomatic.  Will stop flecainide now and recheck EKG in 4 weeks. If WNL, RTC 6 mo. CHADSVASc 1 on xarelto. We discussed AF monitoring at home.     1/26/2023: He is in an atypical AFL, appears rate controlled. He desires redo ablation. Recommend continuing with extended holter for now to ensure he is rate controlled     2/10/2023 holter monitor: Paroxysmal atrial fibrillation and flutter. RVR most noted with atrial flutter. AF burden ~78%. He elected for redo-ablation.     4/28/2023: Mitral annular flutter diagnosed and underwent lateral mitral isthmus ablation with bidirectional block, repeat ablation of CTI for loss of CTI block, re-isolation of RSPV/RIPV and LA posterior wall isolation.     6/20/2023: Mr. Sofia returns for post-ablation follow-up. He feels great. BP not elevated at home. Looking back he rarely has BP readings that would be considered HTN, mostly 110s-120s/80s. He is on 25mg of metoprolol.   In clinic ECG notes sinus bradycardia with narrow QRS. Rate 49 bpm.  Mr. Sofia is a pleasant 58 year-old man with paroxysmal AF, typical AFL, and atypical AFL s/p PVI/CTI ablation 1/2022 with recurrence and then s/p redo-PVI/mitral isthmus ablation/LA posterior wall isolation/repeat CTI ablation 4/2023. He has remained in sinus rhythm. Looking at all of his BP readings I question HTN diagnosis. He measures his BP at home and he is 110s-120s/80s. Very low dose metoprolol is unlikely to create these numbers. [id number] is essentially 0. Would stop metoprolol/xarelto. He will continue to monitor his BP at home and if he starts to consistently get readings in the HTN range then would resume  therapy and discuss resumption of xarelto.     04/29/2025:  -3/26/2025: Last week, he had an EKG done at his job which showed he was back in AF. He was asymptomatic. Eliquis was resumed.  Today, here for RADHIKA/DCCV. No acute complaints.   -3/31/2025: Patient was admitted for AFib, EP consulted, started on amiodarone and underwent cardioversion to sinus rhythm.  During hospital  l stay was asymptomatic and stay was unremarkable, of note he was recently admitted and discharged for similar issues. On this occurrence,  he was discharged on p.o. amiodarone per EP recommendation, metoprolol  and to continue his apixaban.    -4/23/2025: Pt instructed to reduce the Amiodarone to 100 mg ( 1/2 tablet) once a day and stop metoprolol.    Pt is s/p recurrence of AF identified during employee physical last month. Underwent DCCV 3/26/2025. AF recurred within a week and he underwent another DCCV+amio load 3/31/2025. RADHIKA 3/26 showed decrease in EF to 25-30%, likely secondary to AF, echo 5/2021 showed normal LVEF. He reports his breathing significantly improved since second DCCV although he does have more night wakening. BPs increased and he became very jody after amio load but rates have already improved after amio decreased to 100g daily last week. Toprol currently held secondary to bradycardia. BPs slowly improving.  Ablation scheduled 6/16/2025. All questions answered.      Jhonatan Sofia presents today to [facility] for scheduled PFA PVI with Dr. Shell. He denies any chest pain, palpitations, SOB, JACOME, dizziness, light headedness, weakness, syncope, or near syncopal episodes. He denies any bleeding, infections, fevers, rashes, or surgeries in the past 30 days. He is currently taking Amiodarone with last dose 6/6/25; ok to proceed per Dr. Shell. And Eliquis with last dose on 6/15/25 pm.  Preop labs from 6/9/25 without significant alerts.    RADHIKA (3/26/2025):    RADHIKA performed prior to cardioversion. No LA/SURINDER thrombus.    Left  Ventricle: The left ventricle is mildly dilated. Severe global hypokinesis present. There is severely reduced systolic function with a visually estimated ejection fraction of 25 - 30%. Ejection fraction is approximately 25%.    ECG today shows SB at 50 bpm  ms QRS 90 ms QT/Qtc 440/401 ms.     Past Medical History:   Diagnosis Date    Anticoagulant long-term use     Atrial fibrillation     Hyperlipidemia     Hypertension     Kidney stones        Past Surgical History:   Procedure Laterality Date    ABLATION OF ARRHYTHMOGENIC FOCUS FOR ATRIAL FIBRILLATION N/A 1/12/2022    Procedure: Ablation atrial fibrillation;  Surgeon: Scotty Shell MD;  Location: [facility] EP LAB;  Service: Cardiology;  Laterality: N/A;  AF/AFL, RADHIKA (Cx if SR), PVI, CTI, RFA, Carto, Gen, AL, 3 Prep    ABLATION OF ARRHYTHMOGENIC FOCUS FOR ATRIAL FIBRILLATION N/A 4/28/2023    Procedure: Ablation atrial fibrillation;  Surgeon: Scotty Shell MD;  Location: [facility] EP LAB;  Service: Cardiology;  Laterality: N/A;  AF/AFL, RADHIKA (Cx if SR), PVI redo, RFA for Atyp AFL, Carto, Gen, AL, 3 Prep    ABLATION, ATRIAL FLUTTER, ATYPICAL N/A 4/28/2023    Procedure: Ablation, Atrial Flutter, Atypical;  Surgeon: Scotty Shell MD;  Location: [facility] EP LAB;  Service: Cardiology;  Laterality: N/A;    ABLATION, ATRIAL FLUTTER, TYPICAL  4/28/2023    Procedure: Ablation, Atrial Flutter, Typical;  Surgeon: Scotty Shell MD;  Location: [facility] EP LAB;  Service: Cardiology;;    CARDIOVERSION      COLONOSCOPY N/A 1/18/2024    Procedure: COLONOSCOPY;  Surgeon: Shukri Urbina MD;  Location: [facility] ENDO ([address] [place] [address]);  Service: Endoscopy;  Laterality: N/A;  Ref by: Jose Sanchez MD  Prep: PEG  Instructions to portal  No longer taking Xarelto  DBM  10/16/23- change in Dr. Patel' scope schedule / Pt r/s at [address] [place] [address] due to cardiac Hx/ see office visit dated 6/20/23, Pt no longer on Xarelto / prep ins on portal - PEG-ERW  1/11-    ECHOCARDIOGRAM,TRANSESOPHAGEAL N/A 3/26/2025    Procedure:  Transesophageal echo (RADHIKA) intra-procedure log documentation;  Surgeon: Kristian Hawkins MD;  Location: [facility] EP LAB;  Service: Cardiology;  Laterality: N/A;    SHOULDER SURGERY      TREATMENT OF CARDIAC ARRHYTHMIA N/A 8/6/2020    Procedure: CARDIOVERSION;  Surgeon: Scotty Shell MD;  Location: [facility] EP LAB;  Service: Cardiology;  Laterality: N/A;  AF, RADHIKA, DCCV, MAC, MT, 3 Prep    TREATMENT OF CARDIAC ARRHYTHMIA N/A 3/26/2025    Procedure: Cardioversion or Defibrillation;  Surgeon: Scotty Shell MD;  Location: [facility] EP LAB;  Service: Cardiology;  Laterality: N/A;  AF, RADHIKA, DCCV, MAC, MT, 3 Prep    TREATMENT OF CARDIAC ARRHYTHMIA N/A 3/31/2025    Procedure: Cardioversion or Defibrillation;  Surgeon: Juan Miguel Alatorre MD;  Location: [facility] EP LAB;  Service: Cardiology;  Laterality: N/A;  AF, DCCV ONLY, ANES, MT, 354       Review of patient's allergies indicates:  No Known Allergies    Current Facility-Administered Medications on File Prior to Encounter   Medication    sodium chloride 0.9% bolus 1,000 mL    sodium chloride 0.9% bolus 1,000 mL     Current Outpatient Medications on File Prior to Encounter   Medication Sig    apixaban (ELIQUIS) 5 mg Tab Take 1 tablet (5 mg total) by mouth 2 (two) times daily.    amiodarone (PACERONE) 200 MG Tab Take 2 tablets (400 mg total) by mouth 2 (two) times a day for 12 days, THEN 1 tablet (200 mg total) once daily. (Patient taking differently: Take 1/2 tablet daily)    metoprolol tartrate (LOPRESSOR) 25 MG tablet Take 1 tablet (25 mg total) by mouth 2 (two) times daily. (Patient not taking: Reported on 4/29/2025)     Family History       Problem Relation (Age of Onset)    Arthritis Sister, Sister    Heart disease Father    Kidney failure Father    Liver disease Sister          Tobacco Use    Smoking status: Never     Passive exposure: Never    Smokeless tobacco: Never   Substance and Sexual Activity    Alcohol use: Yes     Comment: socially    Drug use: Never    Sexual activity: Not  Currently     Partners: Female     Review of Systems   Constitutional: Negative for chills, diaphoresis, fever and malaise/fatigue.   HENT: Negative.     Eyes:  Negative for pain and visual disturbance.   Cardiovascular:  Negative for chest pain, dyspnea on exertion, irregular heartbeat, leg swelling, orthopnea, palpitations and syncope.   Respiratory:  Negative for cough, hemoptysis, shortness of breath and wheezing.    Endocrine: Negative.    Hematologic/Lymphatic: Negative.    Skin: Negative.  Negative for rash.   Musculoskeletal:  Negative for falls, joint swelling and myalgias.   Gastrointestinal: Negative.  Negative for hematemesis, melena and nausea.   Genitourinary: Negative.  Negative for hematuria.   Neurological:  Negative for dizziness, focal weakness, headaches and light-headedness.   Psychiatric/Behavioral:  Negative for altered mental status.      Objective:     Vital Signs (Most Recent):  Temp: 98.1 °F (36.7 °C) (06/16/25 0548)  Pulse: (!) 54 (06/16/25 0548)  Resp: 18 (06/16/25 0548)  BP: (!) 147/95 (06/16/25 0549)  SpO2: 96 % (06/16/25 0548) Vital Signs (24h Range):  Temp:  [98.1 °F (36.7 °C)] 98.1 °F (36.7 °C)  Pulse:  [54] 54  Resp:  [18] 18  SpO2:  [96 %] 96 %  BP: (147-155)/(95-96) 147/95     Weight: 102.1 kg (225 lb)  Body mass index is 30.52 kg/m².    SpO2: 96 %     Physical Exam  Vitals reviewed.   Constitutional:       General: He is not in acute distress.     Appearance: He is not ill-appearing.   HENT:      Head: Atraumatic.      Nose: No congestion.   Eyes:      Extraocular Movements: Extraocular movements intact.   Cardiovascular:      Rate and Rhythm: Regular rhythm. Bradycardia present.      Pulses: Normal pulses.      Heart sounds: No murmur heard.     Comments: +2 bilateral radial and pedal pulses  Pulmonary:      Effort: Pulmonary effort is normal. No respiratory distress.   Abdominal:      General: Abdomen is flat.      Palpations: Abdomen is soft.   Musculoskeletal:          General: Normal range of motion.      Cervical back: Normal range of motion.      Right lower leg: No edema.      Left lower leg: No edema.   Skin:     General: Skin is warm and dry.      Findings: No rash.   Neurological:      General: No focal deficit present.      Mental Status: He is alert and oriented to person, place, and time. Mental status is at baseline.   Psychiatric:         Mood and Affect: Mood normal.         Behavior: Behavior normal. Behavior is cooperative.        Assessment and Plan:     Plan:  -PFA PVI  -RADHIKA prior to rule out thrombus, cx if SR and compliance with OAC per MD  -Anesthesia for sedation    We discussed risks and benefits at length. Our discussion included, but was not limited to the risk of death, infection, bleeding, stroke, MI, cardiac perforation, embolism, cardiac tamponade, vascular injury, valvular injury, rhabdomyolysis, hemolysis and other organic injury including the possibility for need for surgery or pacemaker implantation. Jhonatan Sofia understood and elected to proceed. All questions answered. Consent signed.    Lilly Jarrett DNP  Cardiology   [facility] - Short Stay Cardiac Unit    Attending: Scotty Shell MD

## 2025-06-16 NOTE — DISCHARGE INSTRUCTIONS
"Ablation Discharge Instructions and Care of Your Groin      Follow up:  Follow up with Dr. Shell in 3 months.    Medications:  Make sure to continue taking your blood thinner Eliquis (apixaban) after your procedure as prescribed  You may experience chest discomfort (also known as "pericarditis") with deep breathes, coughing, and/or laying down which is typically normal following your procedure. If this occurs, you can take ibuprofen (Motrin) 800 mg every 8 hours for 2-3 days. If the chest pain is persistent or severe please visit the nearest emergency department.    Groin site management, precautions, and restrictions:  Remove the bandages over your groin area the morning after your procedure. You can shower after you remove these bandages. Wash the sites at least once daily with soap and water. Keep the groin sites clean and dry. You do not need to apply ointments or bandages to the area.   Wear loose clothes and loose underwear.  Do not take a bath or submerge your groin area (for example: Jacuzzi, swimming pool, or lake) or at least 1 week or when the puncture sites in your groin have completely healed.     If bleeding should occur at the groin sites following discharge:  If oozing from groin site occurs, lay down and apply pressure to the puncture site with your fingers without letting up for 15 minutes and lay flat for 1 hour. If bleeding has resolved, you can continue to monitor. If the bleeding continues or there is significant swelling or pain in the groin area, please call 911 immediately and visit the nearest ER for evaluation and treatment. Do not drive yourself to the hospital. DO NOT STOP TAKING YOUR BLOOD THINNER UNLESS INSTRUCTED BY A PHYSICIAN.     Activity Instructions:  Do not drive or operate any dangerous machinery for 24 hours, but optimally 48-72 hours since you were given general anesthesia.   Do not strain during bowel movements for the first 3 to 4 days after the procedure to prevent " bleeding from the groin sites.  Avoid heavy lifting (more than 10 pounds) and pushing or pulling heavy objects for the first 5 to 7 days after the procedure.  Do not participate in strenuous activities for 5 days after the procedure. This includes most sports - jogging, golfing, play tennis, and bowling.  You may climb stairs if needed, but walk up and down the stairs more slowly than usual.  Gradually increase your activities until you reach your normal activity level within one week after the procedure.    Go to the Emergency Department if you develop:   Change in color or temperature of the leg  Redness, warmth, or drainage/pus at the groin sites  Chills or fever greater than 101.0 F   Severe bleeding or swelling at the groin sites  Acute Weakness or numbness   Visual, gait or speech disturbances   New chest pain, palpitations, shortness of breath, fainting

## 2025-06-16 NOTE — LETTER
June 16, 2025         [address]  [place] [address]  Phone: [phone]  Fax: [phone]       Patient: Jhonatan Sofia   YOB: 1965  Date of Visit: 06/16/2025    To Whom It May Concern:    Alexis Sofia  was at [facility] on 06/16/2025. The patient may return to work on Thursday 6/19/25 with restrictions of light duty (no lifting, pushing or pulling >10lbs). Then return to full duty Monday 6/23/25. If you have any questions or concerns, or if I can be of further assistance, please do not hesitate to contact me.    Sincerely,    Lilly Jarrett, DNP

## 2025-06-16 NOTE — ANESTHESIA PROCEDURE NOTES
Arterial    Diagnosis: a fib    Patient location during procedure: done in OR    Staffing  Authorizing Provider: Satnam Sheldon MD  Performing Provider: Satnam Sheldon MD    Staffing  Performed by: Satnam Sheldon MD  Authorized by: Satnam Sheldon MD    Anesthesiologist was present at the time of the procedure.  Arterial  Skin Prep: chlorhexidine gluconate  Local Infiltration: none  Orientation: right  Location: radial    Catheter Size: 20 G  Catheter placement by Ultrasound guidance. Heme positive aspiration all ports.   Vessel Caliber: patent  Needle advanced into vessel with real time Ultrasound guidance.Insertion Attempts: 1  Assessment  Dressing: secured with tape and tegaderm

## 2025-06-16 NOTE — NURSING TRANSFER
Nursing Transfer Note      6/16/2025   11:24 AM    Nurse giving handoff:karen del rio   Nurse receiving handoff:bradford sscu rn    Reason patient is being transferred: ep pacu 2 to sscu 11/home    Transfer To: ep pacu 2 to sscu 11/home    Transfer via stretcher    Transfer with cardiac monitoring, tele box on confirmed by tele tech    Transported by karen del rio    Transfer Vital Signs:  Blood Pressure:134/90  Heart Rate:56  O2:95% room air  Temperature:97.9  Respirations:15    Telemetry: Box Number [id number], Rate 65, Rhythm sr, and Telemetry  tele tech  Order for Tele Monitor? Yes    Additional Lines: none    Medicines sent: none    Any special needs or follow-up needed: bedrest x4hrs. Left sutures/stopcock removal at 1220. Bedrest done at 1320. Right groin vascade 0920    Patient belongings transferred with patient: sscu locker    Chart send with patient: Yes    Notified: spouse    Patient reassessed at: 6/16/25 1100. Next due 1130  Upon arrival to floor: cardiac monitor applied, patient oriented to room, call bell in reach, and bed in lowest position, groin check done with sscu rn upon arrival to sscu room.

## 2025-06-16 NOTE — SUBJECTIVE & OBJECTIVE
Past Medical History:   Diagnosis Date    Anticoagulant long-term use     Atrial fibrillation     Hyperlipidemia     Hypertension     Kidney stones        Past Surgical History:   Procedure Laterality Date    ABLATION OF ARRHYTHMOGENIC FOCUS FOR ATRIAL FIBRILLATION N/A 1/12/2022    Procedure: Ablation atrial fibrillation;  Surgeon: Scotty Shell MD;  Location: [facility] EP LAB;  Service: Cardiology;  Laterality: N/A;  AF/AFL, RADHIKA (Cx if SR), PVI, CTI, RFA, Carto, Gen, CT, 3 Prep    ABLATION OF ARRHYTHMOGENIC FOCUS FOR ATRIAL FIBRILLATION N/A 4/28/2023    Procedure: Ablation atrial fibrillation;  Surgeon: Scotty Shell MD;  Location: [facility] EP LAB;  Service: Cardiology;  Laterality: N/A;  AF/AFL, RADHIKA (Cx if SR), PVI redo, RFA for Atyp AFL, Carto, Gen, CT, 3 Prep    ABLATION, ATRIAL FLUTTER, ATYPICAL N/A 4/28/2023    Procedure: Ablation, Atrial Flutter, Atypical;  Surgeon: Scotty Shell MD;  Location: [facility] EP LAB;  Service: Cardiology;  Laterality: N/A;    ABLATION, ATRIAL FLUTTER, TYPICAL  4/28/2023    Procedure: Ablation, Atrial Flutter, Typical;  Surgeon: Scotty Shell MD;  Location: [facility] EP LAB;  Service: Cardiology;;    CARDIOVERSION      COLONOSCOPY N/A 1/18/2024    Procedure: COLONOSCOPY;  Surgeon: Shukri Urbina MD;  Location: [facility] ([address] [place] [address]);  Service: Endoscopy;  Laterality: N/A;  Ref by: Jose Sanchez MD  Prep: PEG  Instructions to portal  No longer taking Xarelto  DB  10/16/23- change in Dr. Patel' scope schedule / Pt r/s at [address] [place] [address] due to cardiac Hx/ see office visit dated 6/20/23, Pt no longer on Xarelto / prep ins on portal - PEG-ERW  1/11-    ECHOCARDIOGRAM,TRANSESOPHAGEAL N/A 3/26/2025    Procedure: Transesophageal echo (RADHIKA) intra-procedure log documentation;  Surgeon: Kristian Hawkins MD;  Location: [facility] EP LAB;  Service: Cardiology;  Laterality: N/A;    SHOULDER SURGERY      TREATMENT OF CARDIAC ARRHYTHMIA N/A 8/6/2020    Procedure: CARDIOVERSION;  Surgeon: Scotty Shell MD;  Location: [facility]  EP LAB;  Service: Cardiology;  Laterality: N/A;  AF, RADHIKA, DCCV, MAC, RI, 3 Prep    TREATMENT OF CARDIAC ARRHYTHMIA N/A 3/26/2025    Procedure: Cardioversion or Defibrillation;  Surgeon: Scotty Shell MD;  Location: [facility] EP LAB;  Service: Cardiology;  Laterality: N/A;  AF, RADHIKA, DCCV, MAC, RI, 3 Prep    TREATMENT OF CARDIAC ARRHYTHMIA N/A 3/31/2025    Procedure: Cardioversion or Defibrillation;  Surgeon: Juan Miguel Alatorre MD;  Location: [facility] EP LAB;  Service: Cardiology;  Laterality: N/A;  AF, DCCV ONLY, ANES, RI, 354       Review of patient's allergies indicates:  No Known Allergies    Current Facility-Administered Medications on File Prior to Encounter   Medication    sodium chloride 0.9% bolus 1,000 mL    sodium chloride 0.9% bolus 1,000 mL     Current Outpatient Medications on File Prior to Encounter   Medication Sig    apixaban (ELIQUIS) 5 mg Tab Take 1 tablet (5 mg total) by mouth 2 (two) times daily.    amiodarone (PACERONE) 200 MG Tab Take 2 tablets (400 mg total) by mouth 2 (two) times a day for 12 days, THEN 1 tablet (200 mg total) once daily. (Patient taking differently: Take 1/2 tablet daily)    metoprolol tartrate (LOPRESSOR) 25 MG tablet Take 1 tablet (25 mg total) by mouth 2 (two) times daily. (Patient not taking: Reported on 4/29/2025)     Family History       Problem Relation (Age of Onset)    Arthritis Sister, Sister    Heart disease Father    Kidney failure Father    Liver disease Sister          Tobacco Use    Smoking status: Never     Passive exposure: Never    Smokeless tobacco: Never   Substance and Sexual Activity    Alcohol use: Yes     Comment: socially    Drug use: Never    Sexual activity: Not Currently     Partners: Female     Review of Systems   Constitutional: Negative for chills, diaphoresis, fever and malaise/fatigue.   HENT: Negative.     Eyes:  Negative for pain and visual disturbance.   Cardiovascular:  Negative for chest pain, dyspnea on exertion, irregular heartbeat, leg  swelling, orthopnea, palpitations and syncope.   Respiratory:  Negative for cough, hemoptysis, shortness of breath and wheezing.    Endocrine: Negative.    Hematologic/Lymphatic: Negative.    Skin: Negative.  Negative for rash.   Musculoskeletal:  Negative for falls, joint swelling and myalgias.   Gastrointestinal: Negative.  Negative for hematemesis, melena and nausea.   Genitourinary: Negative.  Negative for hematuria.   Neurological:  Negative for dizziness, focal weakness, headaches and light-headedness.   Psychiatric/Behavioral:  Negative for altered mental status.      Objective:     Vital Signs (Most Recent):  Temp: 98.1 °F (36.7 °C) (06/16/25 0548)  Pulse: (!) 54 (06/16/25 0548)  Resp: 18 (06/16/25 0548)  BP: (!) 147/95 (06/16/25 0549)  SpO2: 96 % (06/16/25 0548) Vital Signs (24h Range):  Temp:  [98.1 °F (36.7 °C)] 98.1 °F (36.7 °C)  Pulse:  [54] 54  Resp:  [18] 18  SpO2:  [96 %] 96 %  BP: (147-155)/(95-96) 147/95     Weight: 102.1 kg (225 lb)  Body mass index is 30.52 kg/m².    SpO2: 96 %       No intake or output data in the 24 hours ending 06/16/25 0642    Lines/Drains/Airways       Peripheral Intravenous Line  Duration                  Peripheral IV - Single Lumen 06/16/25 0558 20 G Left;Posterior Hand <1 day         Peripheral IV - Single Lumen 06/16/25 0605 20 G Right Antecubital <1 day                     Physical Exam  Vitals reviewed.   Constitutional:       General: He is not in acute distress.     Appearance: He is not ill-appearing.   HENT:      Head: Atraumatic.      Nose: No congestion.   Eyes:      Extraocular Movements: Extraocular movements intact.   Cardiovascular:      Rate and Rhythm: Regular rhythm. Bradycardia present.      Pulses: Normal pulses.      Heart sounds: No murmur heard.     Comments: +2 bilateral radial and pedal pulses  Pulmonary:      Effort: Pulmonary effort is normal. No respiratory distress.   Abdominal:      General: Abdomen is flat.      Palpations: Abdomen is soft.    Musculoskeletal:         General: Normal range of motion.      Cervical back: Normal range of motion.      Right lower leg: No edema.      Left lower leg: No edema.   Skin:     General: Skin is warm and dry.      Findings: No rash.   Neurological:      General: No focal deficit present.      Mental Status: He is alert and oriented to person, place, and time. Mental status is at baseline.   Psychiatric:         Mood and Affect: Mood normal.         Behavior: Behavior normal. Behavior is cooperative.

## 2025-06-16 NOTE — PLAN OF CARE
Pt aaox4 follows commands. S/p a fib ablation. Pt arrived to ep pacu 2 with left groin sutures/stopcock x1 willie, well approx. Sutures/stopcock placed at 0920, removal time 1220, bedrest done at 1320. No hematoma or drainage noted. Right groin incision with vascade, guaze/trans film noted. Placed at 920am. No hematoma or drainage noted. Palpable pulses noted. 12 lead EKG done and in chart per md order. Pt denies pain or bladder pressure.  Pt only wanted ice chips in ep pacu 2. Pt's wife updated over text messaging system and re updated when pt transferred to sscu 11. Tele box on confirmed by tele tech. See flowsheet for full assessment. GILBERT metzger np to see pt in sscu room. Groin check done with sscu rn upon arrival to room. Sb noted on cm. Room air.

## 2025-06-20 RX ORDER — APIXABAN 5 MG/1
5 TABLET, FILM COATED ORAL 2 TIMES DAILY
Qty: 60 TABLET | Refills: 2 | Status: SHIPPED | OUTPATIENT
Start: 2025-06-20

## (undated) DEVICE — LINE PRESSURE MONITORING 96IN

## (undated) DEVICE — PACK EP DRAPE OMC

## (undated) DEVICE — Device

## (undated) DEVICE — CATH THERMOCOOL SMTCH SF D F

## (undated) DEVICE — PAD DEFIB CADENCE ADULT R2

## (undated) DEVICE — CATH TRICUSPID HALO XP 7FRX110

## (undated) DEVICE — INTRODUCER HEMOSTASIS 7.5F

## (undated) DEVICE — NDL BROCKENBROUGH ADULT

## (undated) DEVICE — COVER DRAPE ACUSON STERILE

## (undated) DEVICE — REPROCESSED CATH ACUNAV 8FR

## (undated) DEVICE — KIT PROBE COVER WITH GEL

## (undated) DEVICE — SET SMARTABLATE IRR TUBE

## (undated) DEVICE — INTRO AGILIS MED CRL 8.5F 71CM

## (undated) DEVICE — SHEATH PINNACLE INTRO 11FR

## (undated) DEVICE — SHEATH INTRODUCER 9FR 11CM

## (undated) DEVICE — TOWEL OR DISP STRL BLUE 4/PK

## (undated) DEVICE — KIT ENSITE ELECTRODE SURFACE

## (undated) DEVICE — SHEATH HEMOSTASIS 8.5FR

## (undated) DEVICE — PATCH CARTO REFERENCE

## (undated) DEVICE — R CATH ACUSON ACUNAV 8FR

## (undated) DEVICE — BOWL FLUID - BACK STOP

## (undated) DEVICE — NDL PERCUTANEOUS ENTRYBSDN 18

## (undated) DEVICE — CATH BIDIRECTIONAL DF CRV 7FR

## (undated) DEVICE — PACK EP DRAPE

## (undated) DEVICE — CATH ABLATION PULS FIELD 31MM

## (undated) DEVICE — PAD RADI FEMORAL

## (undated) DEVICE — DILATOR COONS TAPER 14FR

## (undated) DEVICE — INTRO FAST-CATH SL1 8.5FR 63CM

## (undated) DEVICE — R CATH TRICSPD HALO XP 7FRX110

## (undated) DEVICE — NDL NRG TRNSPTAL 71CM

## (undated) DEVICE — INTRODUCER HEMOSTASIS 6.5FR

## (undated) DEVICE — R CATH RESPONS QPLR JSN 6F 120

## (undated) DEVICE — NDL TRNSSPTL BRK-1 18GA 98CM

## (undated) DEVICE — ELECTRODE REM PLYHSV RETURN 9

## (undated) DEVICE — R CATH BIDIRECTIONL DF CRV 7FR

## (undated) DEVICE — GUIDEWIRE ROSEN VAS JTIP 180CM

## (undated) DEVICE — CATH LASSO NAV 25/15

## (undated) DEVICE — DILATOR VES COONS .038X16X20CM

## (undated) DEVICE — SHEATH STEERABLE CLEAR

## (undated) DEVICE — COVER TABLE 44X90 STERILE

## (undated) DEVICE — SET HBE EXT CARESITE FILTER

## (undated) DEVICE — GUIDEWIRE AMPLATZ XSTIFF 180CM

## (undated) DEVICE — COVER PRB TRNSDUC 7.6X183CM

## (undated) DEVICE — CATH MAP BI-D HD SENSOR ENABLE

## (undated) DEVICE — CATH PENTARY F 2-6-2MM 115CM